# Patient Record
Sex: FEMALE | Race: WHITE | NOT HISPANIC OR LATINO | Employment: FULL TIME | URBAN - METROPOLITAN AREA
[De-identification: names, ages, dates, MRNs, and addresses within clinical notes are randomized per-mention and may not be internally consistent; named-entity substitution may affect disease eponyms.]

---

## 2017-02-16 ENCOUNTER — GENERIC CONVERSION - ENCOUNTER (OUTPATIENT)
Dept: OTHER | Facility: OTHER | Age: 32
End: 2017-02-16

## 2017-06-19 ENCOUNTER — GENERIC CONVERSION - ENCOUNTER (OUTPATIENT)
Dept: OTHER | Facility: OTHER | Age: 32
End: 2017-06-19

## 2017-06-19 ENCOUNTER — ALLSCRIPTS OFFICE VISIT (OUTPATIENT)
Dept: OTHER | Facility: OTHER | Age: 32
End: 2017-06-19

## 2017-06-28 ENCOUNTER — GENERIC CONVERSION - ENCOUNTER (OUTPATIENT)
Dept: OTHER | Facility: OTHER | Age: 32
End: 2017-06-28

## 2017-10-13 ENCOUNTER — ALLSCRIPTS OFFICE VISIT (OUTPATIENT)
Dept: OTHER | Facility: OTHER | Age: 32
End: 2017-10-13

## 2017-12-13 ENCOUNTER — GENERIC CONVERSION - ENCOUNTER (OUTPATIENT)
Dept: OTHER | Facility: OTHER | Age: 32
End: 2017-12-13

## 2018-01-09 NOTE — CONSULTS
I had the pleasure of evaluating your patient, WILLIE ASIM  My full evaluation follows:      Chief Complaint  Chief Complaint:   The patient presents to the office today with Followup, history of left lower extremity DVT and factor V Leiden mutation  History of Present Illness  29-year-old woman with a factor V Leiden mutation inherited on the paternal side  Patient was admitted to [facility] in 2007 with extensive thrombus in the left lower extremity and had emergency thrombolysis and then was treated with IV heparin and is currently on Coumadin  The patient has been noncompliant in terms of followup and taking her Coumadin and INR checks  Mrs Cecilia Plascencia returns for follow-up  Ms Cecilia Plascencia states feeling okay, baseline  No problems with excessive bruising or bleeding  No lower extremity swelling, pain or cords  No shortness of breath or dyspnea on exertion  No chest pain or pressure  Treatment for diabetes is ongoing  Activities are baseline  As before, patient is not happy with the INR draws; would like to be on 1 of the oral thrombin inhibitors  Review of Systems    Constitutional: as noted in HPI  Eyes: No complaints of eye pain, no red eyes, no eyesight problems, no discharge, no dry eyes, no itching of eyes  ENT: no complaints of earache, no loss of hearing, no nose bleeds, no nasal discharge, no sore throat, no hoarseness  Cardiovascular: No complaints of slow heart rate, no fast heart rate, no chest pain, no palpitations, no leg claudication, no lower extremity edema  Respiratory: No complaints of shortness of breath, no wheezing, no cough, no SOB on exertion, no orthopnea, no PND  Gastrointestinal: No complaints of abdominal pain, no constipation, no nausea or vomiting, no diarrhea, no bloody stools  Genitourinary: No complaints of dysuria, no incontinence, no pelvic pain, no dysmenorrhea, no vaginal discharge or bleeding     Musculoskeletal: No complaints of arthralgias, no myalgias, no joint swelling or stiffness, no limb pain or swelling  Integumentary: No complaints of skin rash or lesions, no itching, no skin wounds, no breast pain or lump and no skin wound  Neurological: No complaints of headache, no confusion, no convulsions, no numbness, no dizziness or fainting, no tingling, no limb weakness, no difficulty walking  Psychiatric: Not suicidal, no sleep disturbance, no anxiety or depression, no change in personality, no emotional problems  Endocrine: No complaints of proptosis, no hot flashes, no muscle weakness, no deepening of the voice, no feelings of weakness  Hematologic/Lymphatic: No complaints of swollen glands, no swollen glands in the neck, does not bleed easily, does not bruise easily  Active Problems    1  Anticoagulant long-term use (V58 61) (Z79 01)   2  Embolism and thrombosis (453 9) (I74 9)   3  Factor V Leiden mutation (289 81) (D68 51)   4  Insulin resistance (277 7) (E88 81)   5  Tobacco use (305 1) (Z72 0)    Past Medical History    · History of Factor V Leiden mutation (289 81) (D68 51)   · History of acute renal failure (V13 09) (Z87 448)   · History of blood coagulation disorder (V12 3) (Z86 2)   · History of epistaxis (V12 69) (Z87 898)   · History of herpes zoster (V12 09) (Z86 19)   · History of hypotension (V12 59) (Z86 79)   · History of menorrhagia (V13 29) (Z87 42)    Surgical History    · History of Interruption Inferior Vena Cava Chelsie Filter Placement    Family History    · Family history of Diabetes Mellitus (V18 0)    · Family history of Cancer   · Family history of Factor V Leiden Mutation, Hypercoagulation   · Family history of Heart Disease (V17 49)   · Family history of Stroke Complications    Social History    · Denied: History of Alcohol Use (History)   · Denied: History of Drug Use   · Never A Smoker   · Tobacco use (305 1) (Z72 0)    Current Meds   1  FLUoxetine HCl - 20 MG Oral Capsule;    Therapy: (Recorded:07Uxp7528) to Recorded   2  MetFORMIN HCl - 500 MG Oral Tablet; take 2 tablet daily; Therapy: (Recorded:11Jan2016) to Recorded   3  Warfarin Sodium 4 MG Oral Tablet; take 2 tablets daily; Therapy: 25MNP6821 to (Evaluate:76Njm6080)  Requested for: 12Sep2017; Last   Rx:12Sep2017; Status: ACTIVE - Retrospective By Protocol Authorization Ordered    Allergies    1  No Known Drug Allergies    Vitals   Recorded: 86VIH6250 11:37AM   Temperature 96 8 F   Heart Rate 91   Respiration 18   Systolic 064   Diastolic 82   Height 5 ft 5 in   Weight 280 lb 2 oz   BMI Calculated 46 62   BSA Calculated 2 28   O2 Saturation 98     Physical Exam    Constitutional   General appearance: Abnormal   marked obesity - same as before  Eyes   Conjunctiva and lids: No swelling, erythema or discharge  Pupils and irises: Equal, round and reactive to light  Ears, Nose, Mouth, and Throat   External inspection of ears and nose: Normal     Nasal mucosa, septum, and turbinates: Normal without edema or erythema  Oropharynx: Normal with no erythema, edema, exudate or lesions  Pulmonary   Respiratory effort: No increased work of breathing or signs of respiratory distress  Auscultation of lungs: Abnormal   Distant breath sounds, scattered rhonchi  Cardiovascular   Palpation of heart: Normal PMI, no thrills  Auscultation of heart: Normal rate and rhythm, normal S1 and S2, without murmurs  Examination of extremities for edema and/or varicosities: Abnormal   Bilateral lower extremity edema and obesity, no obvious cords, pulses are 1+  Abdomen   Abdomen: Non-tender, no masses  Liver and spleen: Abnormal   Obese cannot palpate liver or spleen  Lymphatic   Palpation of lymph nodes in neck: No lymphadenopathy  No adenopathy in the neck, suprapubic area, axilla and groin bilaterally  Musculoskeletal   Gait and station: Normal     Digits and nails: Normal without clubbing or cyanosis      Inspection/palpation of joints, bones, and muscles: Normal     Skin Warm, moist, relatively good color, no petechiae or ecchymoses  Neurologic   Cranial nerves: Cranial nerves 2-12 intact  Sensation: No sensory loss  Psychiatric   Orientation to person, place, and time: Normal     Mood and affect: Normal          Results/Data    Results   Lab Results 10/10/2017 INR = 1 6  08/25/2017 INR = 2 2    5/2/17 INR = 2 2  3/10/17 INR = 2 0  2/13/2017 INR = 1 2  10/16/15 INR = 1 8  7/2/15 INR = 1 6  4/8/15 INR = 1 3  1/5/15 INR = 1 9  11/4/13 PTT = 22 8 INR = 2 1  Assessment    1  Anticoagulant long-term use (V58 61) (Z79 01)   2  Factor V Leiden mutation (289 81) (D68 51)    Plan  Anticoagulant long-term use, Embolism and thrombosis    · (1) PT WITH INR; Status:Active - Retrospective By Protocol Authorization; Requested  for:[id number];    Perform:[facility]; Order Comments:STANDING ORDER TO BE DONE EVERY 4 WEEKS OR AS DIRECTED; IUD:08ACQ1056; Last Updated Mariposa Vitale; 10/13/2017 11:46:08 AM;Ordered; For:Anticoagulant long-term use, Embolism and thrombosis; Ordered By:Seth Laboy;  Factor V Leiden mutation    · Follow-up visit in 4 Months Evaluation and Treatment  Follow-up  Status: Complete   Done: 33DTF0523 01:45PM   Ordered; For: Factor V Leiden mutation; Ordered By: Eriberto Roberts Performed:  Due: 70BOE2151; Last Updated By: Paty Roberts; 10/13/2017 11:58:59 AM    Discussion/Summary    70-year-old woman with factor V Leiden mutation and a prior history of an extensive left lower extremity DVT  Mrs Feli Allan is presently on 9 mg of warfarin daily  Patient feels okay and clinically there are no troubling signs  For the time being, patient is to continue with the Coumadin  We rediscussed the importance of follow-up INRs on time  Patient will also call if she decides on trying to get pregnant  Mrs Feli Allan is to return in 4 months  Carefully review your medication list and verify that the list is accurate and up-to-date  Please call the hematology/oncology office if there are medications missing from the list, medications on the list that you are not currently taking or if there is a dosage or instruction that is different from how you're taking a medication  Possible side effects of new medications were reviewed with the patient/guardian today  Thank you very much for allowing me to participate in the care of this patient  If you have any questions, please do not hesitate to contact me        Signatures   Electronically signed by : Ricci Good MD; Oct 16 2017 10:16AM EST                       (Author)

## 2018-01-12 VITALS
WEIGHT: 293 LBS | BODY MASS INDEX: 48.82 KG/M2 | HEIGHT: 65 IN | DIASTOLIC BLOOD PRESSURE: 78 MMHG | SYSTOLIC BLOOD PRESSURE: 122 MMHG | TEMPERATURE: 98 F | RESPIRATION RATE: 20 BRPM | HEART RATE: 89 BPM

## 2018-01-13 NOTE — MISCELLANEOUS
Message   Recorded as Task   Date: 06/28/2017 08:52 AM, Created By: Claudean Hutch   Task Name: Care Coordination   Assigned To: Shannon Robison   Regarding Patient: Adam PEOPLES, Status: Active   Comment:    Claudean Hutch - 28 Jun 2017 8:52 AM     TASK CREATED  FYI-patient is non-compliant with getting her routine INR test done  Patient was due on May 30th for test and patient has not had done yet  Several messages left for Angelica and she was seen last week and still no blood test    Brigitte Campo - 28 Jun 2017 10:02 AM     TASK EDITED  continued non-compliance noted for pt  Active Problems    1  Anticoagulant long-term use (V58 61) (Z79 01)   2  Embolism and thrombosis (453 9) (I74 9)   3  Factor V Leiden mutation (289 81) (D68 51)   4  Insulin resistance (277 7) (E88 81)   5  Tobacco use (305 1) (Z72 0)    Current Meds   1  FLUoxetine HCl - 20 MG Oral Capsule; Therapy: (Recorded:17Kqu6207) to Recorded   2  MetFORMIN HCl - 500 MG Oral Tablet; take 2 tablet daily; Therapy: (Recorded:11Jan2016) to Recorded   3  Warfarin Sodium 3 MG Oral Tablet; TAKE 3 TABLETS BY MOUTH EVERY DAY; Therapy: 68OJS1709 to (Evaluate:71Tse8000)  Requested for: 75WWG8705; Last   Rx:19Jun2017 Ordered   4  Warfarin Sodium 4 MG Oral Tablet; TAKE 1 TABLET DAILY AS DIRECTED; Therapy: 68CWR7831 to (Evaluate:11Jun2017)  Requested for: 81Pxd9577; Last   Rx:58Xqi1907; Status: ACTIVE - Retrospective By Protocol Authorization Ordered   5  Warfarin Sodium 7 5 MG Oral Tablet (Coumadin); TAKE 1 TABLET BY MOUTH ONCE A   DAY; Therapy: 09NPB4528 to (Evaluate:13Jan2017)  Requested for: 21Nov2016; Last   Rx:14Nov2016 Ordered    Allergies    1   No Known Drug Allergies    Signatures   Electronically signed by : Selinda Burkitt, RN; Jun 28 2017 10:02AM EST                       (Author)

## 2018-01-13 NOTE — MISCELLANEOUS
Message  I called Ms Dionne Lawrence on 16 to remind her to have a PT/INR drawn as it had been 2 months since her last once according to my records  She stated that she had one drawn approximately one month before  I told her I would get the results and be in touch once Dr Niecy St reviewed the results  I also explained that we always call the patient and that if she did not receive a call within a few days to call here  I called [facility] the next morning ( the lab which has her standing order) and they confirmed her last PT/INR was the one I had already addressed from 1/15/16  I called Ms Dionne Lawrence back and left her a message asking her to call here with where she had the testing performed so I could retrieve the recent labs  She has not returned the phone call  As she presently has a prescription good for a year of warfarin, the pharmacy will be contacted to hold refills until she is compliant with her blood draws  Active Problems    1  Anticoagulant long-term use (V58 61) (Z79 01)   2  Embolism and thrombosis (453 9) (I74 9)   3  Factor V Leiden mutation (289 81) (D68 51)    Current Meds   1  FLUoxetine HCl - 40 MG Oral Capsule; Therapy: (Recorded:89Bei6711) to Recorded   2  MetFORMIN HCl - 500 MG Oral Tablet; take 2 tablet daily; Therapy: (Recorded:2016) to Recorded   3  Warfarin Sodium 7 5 MG Oral Tablet (Coumadin); TAKE 1 TABLET DAILY AS   DIRECTED; Therapy: 32QLM4102 to (Evaluate:36Uen4951)  Requested for: 06PGR1216; Last   Rx:2016 Ordered   4  Warfarin Sodium 7 5 MG Oral Tablet; TAKE 1 TABLET DAILY; Therapy: 94FMX1648 to (Evaluate:51Cmd6147)  Requested for: 21XPD0022; Last   Rx:2016 Ordered    Allergies    1   No Known Drug Allergies    Signatures   Electronically signed by : Evelin Umanzor, ; 2016  9:00AM EST                       (Author)

## 2018-01-14 VITALS
BODY MASS INDEX: 46.67 KG/M2 | HEIGHT: 65 IN | WEIGHT: 280.13 LBS | SYSTOLIC BLOOD PRESSURE: 120 MMHG | TEMPERATURE: 96.8 F | OXYGEN SATURATION: 98 % | HEART RATE: 91 BPM | DIASTOLIC BLOOD PRESSURE: 82 MMHG | RESPIRATION RATE: 18 BRPM

## 2018-01-22 VITALS
BODY MASS INDEX: 48 KG/M2 | OXYGEN SATURATION: 98 % | DIASTOLIC BLOOD PRESSURE: 80 MMHG | RESPIRATION RATE: 18 BRPM | HEART RATE: 94 BPM | SYSTOLIC BLOOD PRESSURE: 118 MMHG | TEMPERATURE: 96.5 F | HEIGHT: 65 IN | WEIGHT: 288.13 LBS

## 2018-01-26 LAB — INR PPP: 1.9 (ref 0.86–1.16)

## 2018-01-29 ENCOUNTER — TELEPHONE (OUTPATIENT)
Dept: HEMATOLOGY ONCOLOGY | Facility: MEDICAL CENTER | Age: 33
End: 2018-01-29

## 2018-01-30 DIAGNOSIS — D68.51 FACTOR V LEIDEN MUTATION (HCC): Primary | ICD-10-CM

## 2018-02-09 DIAGNOSIS — I82.5Y9 CHRONIC DEEP VEIN THROMBOSIS (DVT) OF PROXIMAL VEIN OF LOWER EXTREMITY, UNSPECIFIED LATERALITY (HCC): Primary | ICD-10-CM

## 2018-02-09 RX ORDER — WARFARIN SODIUM 4 MG/1
2 TABLET ORAL DAILY
COMMUNITY
Start: 2016-12-01 | End: 2018-02-09 | Stop reason: SDUPTHER

## 2018-02-09 RX ORDER — WARFARIN SODIUM 4 MG/1
8 TABLET ORAL DAILY
Qty: 30 TABLET | Refills: 3 | Status: SHIPPED | OUTPATIENT
Start: 2018-02-09 | End: 2018-10-04 | Stop reason: SDUPTHER

## 2018-03-12 ENCOUNTER — DOCUMENTATION (OUTPATIENT)
Dept: HEMATOLOGY ONCOLOGY | Facility: MEDICAL CENTER | Age: 33
End: 2018-03-12

## 2018-08-22 ENCOUNTER — TELEPHONE (OUTPATIENT)
Dept: FAMILY MEDICINE CLINIC | Facility: CLINIC | Age: 33
End: 2018-08-22

## 2018-08-22 DIAGNOSIS — D68.59 THROMBOPHILIA (HCC): Primary | ICD-10-CM

## 2018-08-22 NOTE — TELEPHONE ENCOUNTER
Patient called she said that she needs a lab slip to get her blood work done for her coumadin levels she can be reached at [phone]   TY

## 2018-08-22 NOTE — TELEPHONE ENCOUNTER
Pt requesting inr bw script  First time script will be ordered in office last seen 3/13/18 for blood clot  Okay to generate script ?

## 2018-08-30 RX ORDER — ALPRAZOLAM 0.25 MG/1
TABLET ORAL
COMMUNITY
Start: 2017-10-18 | End: 2019-02-08 | Stop reason: SDUPTHER

## 2018-08-30 RX ORDER — WARFARIN SODIUM 4 MG/1
TABLET ORAL EVERY 24 HOURS
COMMUNITY
Start: 2018-05-02 | End: 2018-08-31

## 2018-08-30 RX ORDER — FLUOXETINE HYDROCHLORIDE 20 MG/1
CAPSULE ORAL
Refills: 3 | COMMUNITY
Start: 2018-07-04 | End: 2018-12-10 | Stop reason: SDUPTHER

## 2018-08-30 RX ORDER — WARFARIN SODIUM 7.5 MG/1
TABLET ORAL
COMMUNITY
Start: 2016-11-14 | End: 2020-01-29 | Stop reason: DRUGHIGH

## 2018-08-31 ENCOUNTER — OFFICE VISIT (OUTPATIENT)
Dept: OBGYN CLINIC | Facility: CLINIC | Age: 33
End: 2018-08-31
Payer: COMMERCIAL

## 2018-08-31 ENCOUNTER — TELEPHONE (OUTPATIENT)
Dept: FAMILY MEDICINE CLINIC | Facility: CLINIC | Age: 33
End: 2018-08-31

## 2018-08-31 ENCOUNTER — OFFICE VISIT (OUTPATIENT)
Dept: FAMILY MEDICINE CLINIC | Facility: CLINIC | Age: 33
End: 2018-08-31
Payer: COMMERCIAL

## 2018-08-31 VITALS
DIASTOLIC BLOOD PRESSURE: 90 MMHG | RESPIRATION RATE: 18 BRPM | BODY MASS INDEX: 44.81 KG/M2 | WEIGHT: 285.5 LBS | HEART RATE: 88 BPM | SYSTOLIC BLOOD PRESSURE: 160 MMHG | HEIGHT: 67 IN

## 2018-08-31 VITALS
BODY MASS INDEX: 47.65 KG/M2 | DIASTOLIC BLOOD PRESSURE: 80 MMHG | SYSTOLIC BLOOD PRESSURE: 134 MMHG | WEIGHT: 286 LBS | HEIGHT: 65 IN

## 2018-08-31 DIAGNOSIS — IMO0001 CLASS 3 OBESITY WITH BODY MASS INDEX (BMI) OF 45.0 TO 49.9 IN ADULT, UNSPECIFIED OBESITY TYPE, UNSPECIFIED WHETHER SERIOUS COMORBIDITY PRESENT: ICD-10-CM

## 2018-08-31 DIAGNOSIS — E11.8 TYPE 2 DIABETES MELLITUS WITH COMPLICATION, WITHOUT LONG-TERM CURRENT USE OF INSULIN (HCC): Primary | ICD-10-CM

## 2018-08-31 DIAGNOSIS — E11.9 TYPE 2 DIABETES MELLITUS WITHOUT COMPLICATION, WITHOUT LONG-TERM CURRENT USE OF INSULIN (HCC): Primary | ICD-10-CM

## 2018-08-31 DIAGNOSIS — L29.2 VULVAR ITCHING: ICD-10-CM

## 2018-08-31 DIAGNOSIS — Z11.3 SCREENING EXAMINATION FOR VENEREAL DISEASE: ICD-10-CM

## 2018-08-31 DIAGNOSIS — Z01.419 GYNECOLOGIC EXAM NORMAL: Primary | ICD-10-CM

## 2018-08-31 DIAGNOSIS — D68.51 FACTOR V LEIDEN MUTATION (HCC): ICD-10-CM

## 2018-08-31 LAB
ALBUMIN SERPL BCP-MCNC: 3.3 G/DL (ref 3.5–5)
ALP SERPL-CCNC: 73 U/L (ref 46–116)
ALT SERPL W P-5'-P-CCNC: 33 U/L (ref 12–78)
ANION GAP SERPL CALCULATED.3IONS-SCNC: 12 MMOL/L (ref 4–13)
AST SERPL W P-5'-P-CCNC: 19 U/L (ref 5–45)
BILIRUB SERPL-MCNC: 0.39 MG/DL (ref 0.2–1)
BUN SERPL-MCNC: 10 MG/DL (ref 5–25)
CALCIUM SERPL-MCNC: 8.8 MG/DL (ref 8.3–10.1)
CHLORIDE SERPL-SCNC: 100 MMOL/L (ref 100–108)
CO2 SERPL-SCNC: 25 MMOL/L (ref 21–32)
CREAT SERPL-MCNC: 0.61 MG/DL (ref 0.6–1.3)
EST. AVERAGE GLUCOSE BLD GHB EST-MCNC: 212 MG/DL
GFR SERPL CREATININE-BSD FRML MDRD: 119 ML/MIN/1.73SQ M
GLUCOSE SERPL-MCNC: 239 MG/DL (ref 65–140)
HBA1C MFR BLD: 9 % (ref 4.2–6.3)
POTASSIUM SERPL-SCNC: 4.4 MMOL/L (ref 3.5–5.3)
PROT SERPL-MCNC: 6.6 G/DL (ref 6.4–8.2)
SODIUM SERPL-SCNC: 137 MMOL/L (ref 136–145)

## 2018-08-31 PROCEDURE — 83036 HEMOGLOBIN GLYCOSYLATED A1C: CPT | Performed by: NURSE PRACTITIONER

## 2018-08-31 PROCEDURE — 36415 COLL VENOUS BLD VENIPUNCTURE: CPT | Performed by: NURSE PRACTITIONER

## 2018-08-31 PROCEDURE — 99214 OFFICE O/P EST MOD 30 MIN: CPT | Performed by: NURSE PRACTITIONER

## 2018-08-31 PROCEDURE — 99385 PREV VISIT NEW AGE 18-39: CPT | Performed by: PHYSICIAN ASSISTANT

## 2018-08-31 PROCEDURE — 80053 COMPREHEN METABOLIC PANEL: CPT | Performed by: NURSE PRACTITIONER

## 2018-08-31 RX ORDER — LANCETS 30 GAUGE
EACH MISCELLANEOUS
COMMUNITY
Start: 2017-09-07

## 2018-08-31 RX ORDER — CLOBETASOL PROPIONATE 0.5 MG/G
OINTMENT TOPICAL 2 TIMES DAILY
Qty: 45 G | Refills: 0 | Status: SHIPPED | OUTPATIENT
Start: 2018-08-31 | End: 2020-07-29 | Stop reason: SDUPTHER

## 2018-08-31 NOTE — TELEPHONE ENCOUNTER
Can you please change patient medication because the jauvia was very expensive maura horta thank you

## 2018-08-31 NOTE — ASSESSMENT & PLAN NOTE
Was unable to start victoza due to price    Insurance would not cover  Did see endo at [facility] but she doesn't want to use this      Blood Sugar Average: Last 72 hrs:

## 2018-08-31 NOTE — ASSESSMENT & PLAN NOTE
Pap guidelines reviewed  Pap with HPV done today  STD cultures done per patient request    Reviewed birth control options with patient  With hx of DVT on coumadin therapy limited to only progesterone only or nonhormonal options  Reviewed progesterone only pill, Mirena IUD, Depo Provera, Nexplanon, Paragard  Patient most interested in Mirena IUD to also help with heavy menses  Reviewed risks of insertion including perforation, migration that can lead to scarring, surgery and issues with infertility  Reviewed expulsion risk, risk of ectopic pregnancy as well as pelvic inflammatory disease  Reviewed common bleeding patterns and side effects  Consent to check insurance coverage signed today  Will plan to insert with next menstrual cycle pending insurance coverage

## 2018-08-31 NOTE — ASSESSMENT & PLAN NOTE
Reviewed vulvar itching  May be secondary to wearing pad daily, reviewed possible lichen sclerosis  Script for Temovate ointment sent to pharmacy, aware apply thin amount to affected area twice daily for max of 2 weeks at a time  Recommend changing to a different pad or considering not wearing daily  Reviewed options for stress and urge incontinence  Reviewed benefit of weight loss as well as kegel exercises

## 2018-08-31 NOTE — PROGRESS NOTES
Assessment/Plan:    Gynecologic exam normal  Pap guidelines reviewed  Pap with HPV done today  STD cultures done per patient request    Reviewed birth control options with patient  With hx of DVT on coumadin therapy limited to only progesterone only or nonhormonal options  Reviewed progesterone only pill, Mirena IUD, Depo Provera, Nexplanon, Paragard  Patient most interested in Mirena IUD to also help with heavy menses  Reviewed risks of insertion including perforation, migration that can lead to scarring, surgery and issues with infertility  Reviewed expulsion risk, risk of ectopic pregnancy as well as pelvic inflammatory disease  Reviewed common bleeding patterns and side effects  Consent to check insurance coverage signed today  Will plan to insert with next menstrual cycle pending insurance coverage  Vulvar itching  Reviewed vulvar itching  May be secondary to wearing pad daily, reviewed possible lichen sclerosis  Script for Temovate ointment sent to pharmacy, aware apply thin amount to affected area twice daily for max of 2 weeks at a time  Recommend changing to a different pad or considering not wearing daily  Reviewed options for stress and urge incontinence  Reviewed benefit of weight loss as well as kegel exercises  Problem List Items Addressed This Visit     Gynecologic exam normal - Primary     Pap guidelines reviewed  Pap with HPV done today  STD cultures done per patient request    Reviewed birth control options with patient  With hx of DVT on coumadin therapy limited to only progesterone only or nonhormonal options  Reviewed progesterone only pill, Mirena IUD, Depo Provera, Nexplanon, Paragard  Patient most interested in Mirena IUD to also help with heavy menses  Reviewed risks of insertion including perforation, migration that can lead to scarring, surgery and issues with infertility  Reviewed expulsion risk, risk of ectopic pregnancy as well as pelvic inflammatory disease   Reviewed common bleeding patterns and side effects  Consent to check insurance coverage signed today  Will plan to insert with next menstrual cycle pending insurance coverage  Relevant Orders    GP PAP + HPV PLUS + CT + GC    Vulvar itching     Reviewed vulvar itching  May be secondary to wearing pad daily, reviewed possible lichen sclerosis  Script for Temovate ointment sent to pharmacy, aware apply thin amount to affected area twice daily for max of 2 weeks at a time  Recommend changing to a different pad or considering not wearing daily  Reviewed options for stress and urge incontinence  Reviewed benefit of weight loss as well as kegel exercises  Relevant Medications    clobetasol (TEMOVATE) 0 05 % ointment      Other Visit Diagnoses     Screening examination for venereal disease        Relevant Orders    GP PAP + HPV PLUS + CT + GC            Subjective:      Patient ID: Maximo Diego is a 35 y o  female  HPI  34 yo seen for annual exam  Reports menses heavy  Changing pad every 1-2 hrs on heavier days  Lasts 5-6 days  States always heavy have been lighter the past 6 months to year  Had D&C in the past for menorrhagia  Hx significant for Factor V Leiden had DVT  Now has IVF filter and on coumadin  Not using anything for birth control currently  Not planning on having children  Patient reports having itching and irritation of vulva  Wear pad daily secondary to having some urge and stress incontinence  Itching mostly between anus and vaginal orifice and at top of vulva around clitoris  Patient reports has been positive for HPV in the past  Last pap: 2013  The following portions of the patient's history were reviewed and updated as appropriate:   She  has a past medical history of 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[facility] [address] ); Epistaxis; Factor V Leiden mutation ([facility] [address] ); Herpes zoster; Hypotension; and Menorrhagia    She   Patient Active Problem List    Diagnosis Date Noted    Gynecologic exam normal 08/31/2018    Vulvar itching 08/31/2018    Type 2 diabetes mellitus ([place] [address] ) 04/30/2015    Hyperlipidemia 04/30/2015    Anxiety state 04/07/2015    [facility] 12/12/2007    Factor V Leiden mutation ([place] [address] ) 12/12/2007    Pulmonary embolism ([place] [address] ) 12/12/2007     She  has a past surgical history that includes Vena cava filter placement and Dilation and curettage of uterus  Her family history includes Cancer in her family; Diabetes in her father; Factor V Leiden deficiency in her family; Heart disease in her family; Hypercoagulant Ability in her family; Stroke in her family  She  reports that she quit smoking about 7 months ago  She has never used smokeless tobacco  She reports that she does not drink alcohol or use drugs  Current Outpatient Prescriptions   Medication Sig Dispense Refill    ALPRAZolam (XANAX) 0 25 mg tablet take 1/2 to 1 tab  by oral route every 8 hrs as needed for anxiety      Blood Glucose Monitoring Suppl MISC test one time daily      clobetasol (TEMOVATE) 0 05 % ointment Apply topically 2 (two) times a day For max of 2 weeks at a time  45 g 0    Empagliflozin (JARDIANCE) 10 MG TABS Take 1 tablet (10 mg total) by mouth every morning 30 tablet 1    Fluoxetine HCl, PMDD, 20 MG CAPS TK 1 C PO QD IN THE MORNING  3    glucose blood test strip test one time daily      Insulin Pen Needle (NOVOFINE) 32G X 6 MM MISC use once a day with victoza      Lancets MISC test one time daily      metFORMIN (GLUCOPHAGE) 1000 MG tablet 1,000 mg 2 (two) times a day       warfarin (COUMADIN) 4 mg tablet Take 2 tablets (8 mg total) by mouth daily 30 tablet 3    warfarin (COUMADIN) 7 5 mg tablet TK 1 T PO QD       No current facility-administered medications for this visit  She is allergic to no active allergies       Review of Systems   Constitutional: Negative for fatigue, fever and unexpected weight change  HENT: Negative for dental problem and sinus pressure      Eyes: Negative for visual disturbance  Respiratory: Negative for cough, shortness of breath and wheezing  Cardiovascular: Negative for chest pain  Gastrointestinal: Negative for abdominal pain, blood in stool, constipation, diarrhea, nausea and vomiting  Endocrine: Negative for polydipsia  Genitourinary: Negative for difficulty urinating, dyspareunia, dysuria, frequency, hematuria, pelvic pain and urgency  Musculoskeletal: Negative for arthralgias and back pain  Neurological: Negative for dizziness, seizures, light-headedness and headaches  Psychiatric/Behavioral: Negative for suicidal ideas  The patient is not nervous/anxious  Objective:      /80 (BP Location: Left arm, Patient Position: Sitting, Cuff Size: Large)   Ht 5' 5" (1 651 m)   Wt 130 kg (286 lb)   LMP 08/06/2018 (Approximate)   BMI 47 59 kg/m²          Physical Exam   Constitutional: She is oriented to person, place, and time  She appears well-developed and well-nourished  Neck: Neck supple  No thyroid mass and no thyromegaly present  Cardiovascular: Normal rate, regular rhythm and normal heart sounds  Exam reveals no gallop and no friction rub  No murmur heard  Pulmonary/Chest: Effort normal and breath sounds normal  No respiratory distress  She has no wheezes  She has no rales  Right breast exhibits no inverted nipple, no mass, no nipple discharge, no skin change and no tenderness  Left breast exhibits no inverted nipple, no mass, no nipple discharge, no skin change and no tenderness  Breasts are symmetrical    Abdominal: Soft  Normal appearance and bowel sounds are normal  She exhibits no distension and no mass  There is no tenderness  There is no rebound and no guarding  Genitourinary:       No breast swelling, tenderness, discharge or bleeding  There is rash on the right labia  There is no tenderness, lesion or injury on the right labia  There is rash on the left labia   There is no tenderness, lesion or injury on the left labia  Uterus is not deviated, not enlarged and not tender  Cervix exhibits no motion tenderness, no discharge and no friability  Right adnexum displays no mass, no tenderness and no fullness  Left adnexum displays no mass, no tenderness and no fullness  No erythema, tenderness or bleeding in the vagina  No signs of injury around the vagina  No vaginal discharge found  Lymphadenopathy:     She has no cervical adenopathy  Right: No inguinal and no supraclavicular adenopathy present  Left: No inguinal and no supraclavicular adenopathy present  Neurological: She is alert and oriented to person, place, and time  Skin: Skin is warm and dry  No rash noted  No erythema  No pallor  Psychiatric: She has a normal mood and affect   Her behavior is normal  Judgment and thought content normal

## 2018-08-31 NOTE — PROGRESS NOTES
Assessment/Plan:    Type 2 diabetes mellitus ([address] )  Was unable to start victoza due to price    Insurance would not cover  Did see endo at [facility] but she doesn't want to use this  Blood Sugar Average: Last 72 hrs:           Diagnoses and all orders for this visit:    Type 2 diabetes mellitus with complication, without long-term current use of insulin (HCC)  -     HEMOGLOBIN A1C W/ EAG ESTIMATION  -     Comprehensive metabolic panel  -     sitaGLIPtin (JANUVIA) 50 mg tablet; Take 1 tablet (50 mg total) by mouth daily  Drop off numbers in 2 weeks   Factor V Leiden mutation [facility])  Will check on the oral meds and may be able to get off the coumadin  Dr Angelica Wright tried to change but then the insurance didn't cover  She will call and try othe other ones and let us know  Other orders  -     Fluoxetine HCl, PMDD, 20 MG CAPS; TK 1 C PO QD IN THE MORNING  -     metFORMIN (GLUCOPHAGE) 1000 MG tablet; 1,000 mg 2 (two) times a day   -     Discontinue: liraglutide (VICTOZA) injection; inject (1 8MG)  by subcutaneous route  every day  -     ALPRAZolam (XANAX) 0 25 mg tablet; take 1/2 to 1 tab  by oral route every 8 hrs as needed for anxiety  -     warfarin (COUMADIN) 7 5 mg tablet; TK 1 T PO QD  -     Discontinue: warfarin (COUMADIN) 4 mg tablet; every 24 hours          Subjective:      Patient ID: Janine Wesley is a 35 y o  female  HPI    Pt is here for DM   Was seen at [facility] endo and placed on Victoza    Was not able to start due to price and she was concerned with side effects  Doesn't want to go back to endo at this time   Doesn't want to start injectables yet  Wants to start on another oral first    Willing to set up to the dietitian       States Dr Angelica Wright wanted to get ogg of coumadin and put her on one of the oral meds  She is not sure which but it was too expensive  Gave her names of all of them and she will check with insurance as well as websites  She will let us know       The following portions of the patient's history were reviewed and updated as appropriate: allergies, current medications, past family history, past medical history, past social history, past surgical history and problem list     Review of Systems   Constitutional: Negative for activity change, appetite change, fatigue, fever and unexpected weight change  HENT: Negative for congestion, dental problem and sneezing  Eyes: Negative for discharge and visual disturbance  Respiratory: Negative for cough and wheezing  Gastrointestinal: Negative for abdominal pain, constipation, diarrhea, nausea and vomiting  Endocrine: Negative for polydipsia and polyuria  Genitourinary: Negative for dysuria and frequency  Musculoskeletal: Negative for arthralgias  Skin: Negative for rash  Allergic/Immunologic: Negative for environmental allergies and food allergies  Neurological: Positive for light-headedness  Negative for headaches  Hematological: Negative for adenopathy  Psychiatric/Behavioral: Negative for behavioral problems and sleep disturbance  Objective:  Vitals:    08/31/18 1127   BP: 160/90   BP Location: Left arm   Patient Position: Sitting   Cuff Size: Large   Pulse: 88   Resp: 18   Weight: 130 kg (285 lb 8 oz)   Height: 5' 6 5" (1 689 m)      Physical Exam   Constitutional: She appears well-developed and well-nourished  Neck: Normal range of motion  Neck supple  Cardiovascular: Normal rate, regular rhythm and normal heart sounds  Pulmonary/Chest: Effort normal and breath sounds normal    Musculoskeletal: Normal range of motion  She exhibits no edema

## 2018-09-05 LAB
DEPRECATED C TRACH RRNA XXX QL PRB: NOT DETECTED
HPV HR 12 DNA CVX QL NAA+PROBE: NOT DETECTED
HPV16 DNA SPEC QL NAA+PROBE: NOT DETECTED
HPV18 DNA SPEC QL NAA+PROBE: NOT DETECTED
N GONORRHOEA DNA UR QL NAA+PROBE: NOT DETECTED
THIN PREP CVX: NORMAL

## 2018-09-07 ENCOUNTER — TELEPHONE (OUTPATIENT)
Dept: OBGYN CLINIC | Facility: CLINIC | Age: 33
End: 2018-09-07

## 2018-09-14 ENCOUNTER — TELEPHONE (OUTPATIENT)
Dept: OBGYN CLINIC | Facility: CLINIC | Age: 33
End: 2018-09-14

## 2018-09-14 ENCOUNTER — TELEPHONE (OUTPATIENT)
Dept: LABOR AND DELIVERY | Facility: HOSPITAL | Age: 33
End: 2018-09-14

## 2018-09-14 NOTE — TELEPHONE ENCOUNTER
PT CALLED INSURANCE [employer]  COVERED @ 100%  PT ORDERED AND SCHEDULED  TASK SENT TO DR GARCIA FOR CONSENT  PT ALSO AWARE OF PHONE CALL

## 2018-09-15 NOTE — TELEPHONE ENCOUNTER
Phone call to patient  Reviewed with patient device mirena function, risks, benefits, and placement procedure also reviewed in regards to coumadin use  Patient reports understanding and consent with no further questions  Patient desires to proceed and will follow up at appointment

## 2018-09-21 ENCOUNTER — PROCEDURE VISIT (OUTPATIENT)
Dept: OBGYN CLINIC | Facility: CLINIC | Age: 33
End: 2018-09-21
Payer: COMMERCIAL

## 2018-09-21 VITALS
WEIGHT: 284 LBS | HEIGHT: 65 IN | DIASTOLIC BLOOD PRESSURE: 84 MMHG | BODY MASS INDEX: 47.32 KG/M2 | SYSTOLIC BLOOD PRESSURE: 132 MMHG

## 2018-09-21 DIAGNOSIS — Z30.430 ENCOUNTER FOR IUD INSERTION: Primary | ICD-10-CM

## 2018-09-21 LAB — SL AMB POCT URINE HCG: NEGATIVE

## 2018-09-21 PROCEDURE — 81025 URINE PREGNANCY TEST: CPT | Performed by: PHYSICIAN ASSISTANT

## 2018-09-21 PROCEDURE — 58300 INSERT INTRAUTERINE DEVICE: CPT | Performed by: PHYSICIAN ASSISTANT

## 2018-09-21 NOTE — PROGRESS NOTES
Assessment/Plan:    Encounter for IUD insertion  No intercourse, tampon use, soaking in bath tub, or swimming for the next 3 days to avoid risk of infection  Call office with excessive bleeding, cramping, fever, or chills  Problem List Items Addressed This Visit     Encounter for IUD insertion - Primary     No intercourse, tampon use, soaking in bath tub, or swimming for the next 3 days to avoid risk of infection  Call office with excessive bleeding, cramping, fever, or chills  Relevant Medications    levonorgestrel (MIRENA) IUD 20 mcg/day (Completed)            Subjective:      Patient ID: Edward Way is a 35 y o  female  HPI  36 yo seen for Mirena IUD insertion  Patient has menorrhagia and unable to be on estrogen contraceptive secondary to hx on DVT on Coumadin therapy  LMP: 9/12/2018  The following portions of the patient's history were reviewed and updated as appropriate:   She  has a past medical history of 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[address] ); Epistaxis; Factor V Leiden mutation ([facility] ); Herpes zoster; Hypotension; and Menorrhagia  She   Patient Active Problem List    Diagnosis Date Noted    Encounter for IUD insertion 09/21/2018    Gynecologic exam normal 08/31/2018    Vulvar itching 08/31/2018    Type [facility] diabetes mellitus ([facility] ) 04/30/2015    Hyperlipidemia 04/30/2015    Anxiety state 04/07/2015    Deep vein thrombosis (DVT) ([facility] 12/12/2007    Factor V Leiden mutation ([facility] ) 12/12/2007    Pulmonary embolism ([place] [address] ) 12/12/2007     She  has a past surgical history that includes Vena cava filter placement and Dilation and curettage of uterus  Her family history includes Cancer in her family; Diabetes in her father; Factor V Leiden deficiency in her family; Heart disease in her family; Hypercoagulant Ability in her family; Stroke in her family  She  reports that she quit smoking about 8 months ago   She has never used smokeless tobacco  She reports that she does not drink alcohol or use drugs   Current Outpatient Prescriptions   Medication Sig Dispense Refill    ALPRAZolam (XANAX) 0 25 mg tablet take 1/2 to 1 tab  by oral route every 8 hrs as needed for anxiety      Blood Glucose Monitoring Suppl MISC test one time daily      clobetasol (TEMOVATE) 0 05 % ointment Apply topically 2 (two) times a day For max of 2 weeks at a time  45 g 0    Empagliflozin (JARDIANCE) 10 MG TABS Take 1 tablet (10 mg total) by mouth every morning 30 tablet 1    Fluoxetine HCl, PMDD, 20 MG CAPS TK 1 C PO QD IN THE MORNING  3    glucose blood test strip test one time daily      Insulin Pen Needle (NOVOFINE) 32G X 6 MM MISC use once a day with victoza      Lancets MISC test one time daily      metFORMIN (GLUCOPHAGE) 1000 MG tablet 1,000 mg 2 (two) times a day       warfarin (COUMADIN) 4 mg tablet Take 2 tablets (8 mg total) by mouth daily 30 tablet 3    warfarin (COUMADIN) 7 5 mg tablet TK 1 T PO QD       No current facility-administered medications for this visit  She is allergic to no active allergies and no known allergies       Review of Systems   Constitutional: Negative for fatigue, fever and unexpected weight change  HENT: Negative for dental problem and sinus pressure  Eyes: Negative for visual disturbance  Respiratory: Negative for cough, shortness of breath and wheezing  Cardiovascular: Negative for chest pain  Gastrointestinal: Negative for abdominal pain, blood in stool, constipation, diarrhea, nausea and vomiting  Endocrine: Negative for polydipsia  Genitourinary: Negative for difficulty urinating, dyspareunia, dysuria, frequency, hematuria, pelvic pain and urgency  Musculoskeletal: Negative for arthralgias and back pain  Neurological: Negative for dizziness, seizures, light-headedness and headaches  Psychiatric/Behavioral: Negative for suicidal ideas  The patient is not nervous/anxious            Objective:      /84 (BP Location: Left arm, Patient Position: Sitting, Cuff Size: Large)   Ht 5' 5" (1 651 m)   Wt 129 kg (284 lb)   LMP 09/12/2018 (Exact Date)   BMI 47 26 kg/m²            Physical Exam   Constitutional: She is oriented to person, place, and time  She appears well-developed and well-nourished  Genitourinary: There is no rash, tenderness, lesion or injury on the right labia  There is no rash, tenderness, lesion or injury on the left labia  Cervix exhibits no motion tenderness, no discharge and no friability  No erythema, tenderness or bleeding in the vagina  No foreign body in the vagina  No signs of injury around the vagina  No vaginal discharge found  Neurological: She is alert and oriented to person, place, and time  Skin: Skin is warm and dry  No rash noted  No erythema  No pallor         Iud insertions  Date/Time: 9/21/2018 2:59 PM  Performed by: Martina Long by: Lavern Fermin     Consent:     Consent obtained:  Verbal and written    Consent given by:  Patient    Procedure risks and benefits discussed: yes      Patient questions answered: yes      Patient agrees, verbalizes understanding, and wants to proceed: yes      Educational handouts given: yes      Instructions and paperwork completed: yes    Universal protocol:     Patient states understanding of procedure being performed: yes      Relevant documents present and verified: yes    Procedure:     Pelvic exam performed: yes      Negative urine pregnancy test: yes      Cervix cleaned and prepped: yes      Speculum placed in vagina: yes      Tenaculum applied to cervix: yes      Uterus sounded: yes      IUD inserted with no complications: yes      IUD type:  Mirena    Strings trimmed: yes (2 cm)      Uterus sound depth (cm):  8  Post-procedure:     Patient tolerated procedure well: yes

## 2018-09-26 ENCOUNTER — OFFICE VISIT (OUTPATIENT)
Dept: FAMILY MEDICINE CLINIC | Facility: CLINIC | Age: 33
End: 2018-09-26
Payer: COMMERCIAL

## 2018-09-26 VITALS
BODY MASS INDEX: 39.54 KG/M2 | SYSTOLIC BLOOD PRESSURE: 110 MMHG | RESPIRATION RATE: 18 BRPM | HEIGHT: 65 IN | HEART RATE: 94 BPM | WEIGHT: 237.3 LBS | DIASTOLIC BLOOD PRESSURE: 90 MMHG

## 2018-09-26 DIAGNOSIS — H91.8X3 OTHER SPECIFIED HEARING LOSS OF BOTH EARS: Primary | ICD-10-CM

## 2018-09-26 DIAGNOSIS — Z23 IMMUNIZATION DUE: ICD-10-CM

## 2018-09-26 PROCEDURE — 86580 TB INTRADERMAL TEST: CPT

## 2018-09-26 PROCEDURE — 99213 OFFICE O/P EST LOW 20 MIN: CPT | Performed by: NURSE PRACTITIONER

## 2018-09-26 NOTE — PATIENT INSTRUCTIONS
Hearing Loss   WHAT YOU NEED TO KNOW:   Hearing loss means you have trouble hearing or you cannot hear at all in one or both ears  Hearing loss can happen suddenly or slowly over time  DISCHARGE INSTRUCTIONS:   Return to the emergency department if:   · You have fluid, pus, or blood leaking from your ear  · You have sudden, severe hearing loss  Contact your healthcare provider if:   · You have a fever  · You have ear pain that is getting worse  · You have ringing in your ears or dizziness that will not go away  · You have questions or concerns about your condition or care  Manage your hearing loss:   · Protect your hearing  Use ear plugs or ear protectors if you do activities that are very loud  These include using a lawnmower and power tools or going to a concert that has loud music  Use well-fitting foam earplugs that completely block your ear canal  Do not listen to loud music through headphones or earphones  · If you have hearing aids, wear them regularly  Talk to your healthcare provider if you are having trouble using your hearing aid  · Ask about cochlear implants  If hearing aids do not help you, talk to your healthcare provider  Cochlear implants may help you hear better  A cochlear implant is a tiny device that is put into your cochlea (part of your inner ear) during surgery  · Assistive listening devices  (ALDs) pic up sound and send it through earphones or a headset  ALDs can help you hear better when you are in a place with background noise  Examples include theaters, classrooms, or auditoriums  ALDs are also available for phones  ALDs can be used alone or with hearing aids or cochlear implants  · Tell people that you have hearing loss  Ask people to face you directly when they speak to you, and to slow down if they are speaking too fast  When you are in a group setting, sit in a location where you can clearly see the faces of the people who are speaking   Ask people not to speak loudly or shout when they are speaking to you  Try to talk with others in a quiet place  Background noise makes it harder for you to hear  · Pay close attention to your surroundings when you drive  Do not talk to people in your car while you are driving  Watch for problems on the road or approaching emergency vehicles  Follow up with your healthcare provider or audiologist as directed:  Write down your questions so you remember to ask them during your visits  © 2017 [facility] Information is for End User's use only and may not be sold, redistributed or otherwise used for commercial purposes  All illustrations and images included in CareNotes® are the copyrighted property of [employer]  or [employer]  The above information is an  only  It is not intended as medical advice for individual conditions or treatments  Talk to your doctor, nurse or pharmacist before following any medical regimen to see if it is safe and effective for you

## 2018-09-26 NOTE — PROGRESS NOTES
Assessment/Plan:    No problem-specific Assessment & Plan notes found for this encounter  Diagnoses and all orders for this visit:    Immunization due  -     TB Skin Test          Subjective:   Chief Complaint   Patient presents with    Cerumen Impaction    PPD Placement        Patient ID: Taylor Cespedes is a 35 y o  female  Presents today with complaints of ear discomfort and hearing loss for about 5 years  The following portions of the patient's history were reviewed and updated as appropriate: allergies, current medications, past family history, past medical history, past social history, past surgical history and problem list     Review of Systems   HENT: Positive for ear pain (discomfort)  Negative for congestion, ear discharge and hearing loss  Respiratory: Negative for cough  Cardiovascular: Negative for chest pain  Psychiatric/Behavioral: Negative for dysphoric mood  The patient is not nervous/anxious  Objective:  Vitals:    09/26/18 1513   BP: 110/90   BP Location: Left arm   Patient Position: Sitting   Cuff Size: Standard   Pulse: 94   Resp: 18   Weight: 108 kg (237 lb 4 8 oz)   Height: 5' 5" (1 651 m)      Physical Exam   Constitutional: She is oriented to person, place, and time  She appears well-developed and well-nourished  HENT:   Right Ear: External ear normal  Decreased hearing is noted  Left Ear: External ear normal  Decreased hearing is noted  Cardiovascular: Normal rate, regular rhythm and normal heart sounds  Pulmonary/Chest: Breath sounds normal    Neurological: She is alert and oriented to person, place, and time  Skin: Skin is warm and dry  Psychiatric: She has a normal mood and affect   Her behavior is normal

## 2018-09-28 ENCOUNTER — CLINICAL SUPPORT (OUTPATIENT)
Dept: FAMILY MEDICINE CLINIC | Facility: CLINIC | Age: 33
End: 2018-09-28

## 2018-09-28 DIAGNOSIS — Z11.1 TUBERCULIN SKIN TEST READING ENCOUNTER: Primary | ICD-10-CM

## 2018-09-28 LAB
INDURATION: 0 MM
TB SKIN TEST: NEGATIVE

## 2018-10-04 DIAGNOSIS — E11.9 TYPE 2 DIABETES MELLITUS WITHOUT COMPLICATION, WITHOUT LONG-TERM CURRENT USE OF INSULIN (HCC): Primary | ICD-10-CM

## 2018-10-04 DIAGNOSIS — I82.5Y9 CHRONIC DEEP VEIN THROMBOSIS (DVT) OF PROXIMAL VEIN OF LOWER EXTREMITY, UNSPECIFIED LATERALITY (HCC): ICD-10-CM

## 2018-10-04 RX ORDER — WARFARIN SODIUM 4 MG/1
TABLET ORAL
Qty: 90 TABLET | Refills: 0 | Status: SHIPPED | OUTPATIENT
Start: 2018-10-04 | End: 2018-12-09 | Stop reason: SDUPTHER

## 2018-11-23 ENCOUNTER — TELEPHONE (OUTPATIENT)
Dept: FAMILY MEDICINE CLINIC | Facility: CLINIC | Age: 33
End: 2018-11-23

## 2018-12-06 ENCOUNTER — TELEPHONE (OUTPATIENT)
Dept: FAMILY MEDICINE CLINIC | Facility: CLINIC | Age: 33
End: 2018-12-06

## 2018-12-09 DIAGNOSIS — I82.5Y9 CHRONIC DEEP VEIN THROMBOSIS (DVT) OF PROXIMAL VEIN OF LOWER EXTREMITY, UNSPECIFIED LATERALITY (HCC): ICD-10-CM

## 2018-12-10 DIAGNOSIS — F41.9 ANXIETY: Primary | ICD-10-CM

## 2018-12-10 RX ORDER — INFLUENZA A VIRUS A/SINGAPORE/GP1908/2015 IVR-180A (H1N1) ANTIGEN (PROPIOLACTONE INACTIVATED), INFLUENZA A VIRUS A/HONG KONG/4801/2014 X-263B (H3N2) ANTIGEN (PROPIOLACTONE INACTIVATED), INFLUENZA B VIRUS B/BRISBANE/46/2015 ANTIGEN (PROPIOLACTONE INACTIVATED), AND INFLUENZA B VIRUS B/PHUKET/3073/2013 BVR-1B ANTIGEN (PROPIOLACTONE INACTIVATED) 15; 15; 15; 15 UG/.5ML; UG/.5ML; UG/.5ML; UG/.5ML
INJECTION, SUSPENSION INTRAMUSCULAR
Refills: 0 | COMMUNITY
Start: 2018-10-05 | End: 2019-02-08 | Stop reason: ALTCHOICE

## 2018-12-10 RX ORDER — WARFARIN SODIUM 4 MG/1
TABLET ORAL
Qty: 90 TABLET | Refills: 12 | Status: SHIPPED | OUTPATIENT
Start: 2018-12-10 | End: 2019-03-25 | Stop reason: SDUPTHER

## 2018-12-11 DIAGNOSIS — F41.9 ANXIETY: ICD-10-CM

## 2018-12-11 RX ORDER — FLUOXETINE HYDROCHLORIDE 20 MG/1
CAPSULE ORAL
Qty: 30 EACH | Refills: 0 | Status: SHIPPED | OUTPATIENT
Start: 2018-12-11 | End: 2019-01-06 | Stop reason: SDUPTHER

## 2018-12-11 RX ORDER — FLUOXETINE HYDROCHLORIDE 20 MG/1
CAPSULE ORAL
Qty: 30 EACH | Refills: 2 | OUTPATIENT
Start: 2018-12-11

## 2018-12-20 ENCOUNTER — TELEPHONE (OUTPATIENT)
Dept: FAMILY MEDICINE CLINIC | Facility: CLINIC | Age: 33
End: 2018-12-20

## 2019-01-06 DIAGNOSIS — F41.9 ANXIETY: ICD-10-CM

## 2019-01-08 RX ORDER — FLUOXETINE HYDROCHLORIDE 20 MG/1
CAPSULE ORAL
Qty: 30 CAPSULE | Refills: 3 | Status: SHIPPED | OUTPATIENT
Start: 2019-01-08 | End: 2019-06-11 | Stop reason: ALTCHOICE

## 2019-01-30 ENCOUNTER — TELEPHONE (OUTPATIENT)
Dept: FAMILY MEDICINE CLINIC | Facility: CLINIC | Age: 34
End: 2019-01-30

## 2019-01-30 DIAGNOSIS — I82.409 DEEP VEIN THROMBOSIS (DVT) OF LOWER EXTREMITY, UNSPECIFIED CHRONICITY, UNSPECIFIED LATERALITY, UNSPECIFIED VEIN (HCC): Primary | ICD-10-CM

## 2019-01-30 NOTE — TELEPHONE ENCOUNTER
Spoke to Angelica she said that Dr Srikanth Pimentel manages her coumadin and she has not had it done for months because she did not have insurance, last coumadin dose was 5mg please advise on the script I can generate and fax it

## 2019-01-30 NOTE — TELEPHONE ENCOUNTER
Patient called she would like to get a lab script for PT- INR and would like that to be fax to [phone]

## 2019-02-08 ENCOUNTER — ANTICOAG VISIT (OUTPATIENT)
Dept: FAMILY MEDICINE CLINIC | Facility: CLINIC | Age: 34
End: 2019-02-08

## 2019-02-08 ENCOUNTER — OFFICE VISIT (OUTPATIENT)
Dept: FAMILY MEDICINE CLINIC | Facility: CLINIC | Age: 34
End: 2019-02-08
Payer: COMMERCIAL

## 2019-02-08 VITALS
WEIGHT: 277.8 LBS | DIASTOLIC BLOOD PRESSURE: 72 MMHG | HEIGHT: 66 IN | TEMPERATURE: 96.5 F | BODY MASS INDEX: 44.65 KG/M2 | SYSTOLIC BLOOD PRESSURE: 102 MMHG

## 2019-02-08 DIAGNOSIS — E11.9 TYPE 2 DIABETES MELLITUS WITHOUT COMPLICATION, WITHOUT LONG-TERM CURRENT USE OF INSULIN (HCC): ICD-10-CM

## 2019-02-08 DIAGNOSIS — F41.9 ANXIETY: ICD-10-CM

## 2019-02-08 DIAGNOSIS — I82.409 DEEP VEIN THROMBOSIS (DVT) OF LOWER EXTREMITY, UNSPECIFIED CHRONICITY, UNSPECIFIED LATERALITY, UNSPECIFIED VEIN (HCC): ICD-10-CM

## 2019-02-08 DIAGNOSIS — I26.99 OTHER PULMONARY EMBOLISM WITHOUT ACUTE COR PULMONALE, UNSPECIFIED CHRONICITY (HCC): ICD-10-CM

## 2019-02-08 DIAGNOSIS — Z00.00 ANNUAL PHYSICAL EXAM: Primary | ICD-10-CM

## 2019-02-08 DIAGNOSIS — L30.9 ECZEMA, UNSPECIFIED TYPE: ICD-10-CM

## 2019-02-08 DIAGNOSIS — E78.5 HYPERLIPIDEMIA, UNSPECIFIED HYPERLIPIDEMIA TYPE: ICD-10-CM

## 2019-02-08 LAB
ALBUMIN SERPL BCP-MCNC: 3.4 G/DL (ref 3.5–5)
ALP SERPL-CCNC: 72 U/L (ref 46–116)
ALT SERPL W P-5'-P-CCNC: 36 U/L (ref 12–78)
ANION GAP SERPL CALCULATED.3IONS-SCNC: 9 MMOL/L (ref 4–13)
AST SERPL W P-5'-P-CCNC: 19 U/L (ref 5–45)
BASOPHILS # BLD AUTO: 0.03 THOUSANDS/ΜL (ref 0–0.1)
BASOPHILS NFR BLD AUTO: 0 % (ref 0–1)
BILIRUB SERPL-MCNC: 0.35 MG/DL (ref 0.2–1)
BUN SERPL-MCNC: 11 MG/DL (ref 5–25)
CALCIUM SERPL-MCNC: 8.6 MG/DL (ref 8.3–10.1)
CHLORIDE SERPL-SCNC: 102 MMOL/L (ref 100–108)
CHOLEST SERPL-MCNC: 179 MG/DL (ref 50–200)
CO2 SERPL-SCNC: 26 MMOL/L (ref 21–32)
CREAT SERPL-MCNC: 0.62 MG/DL (ref 0.6–1.3)
CREAT UR-MCNC: 99.5 MG/DL
EOSINOPHIL # BLD AUTO: 0.2 THOUSAND/ΜL (ref 0–0.61)
EOSINOPHIL NFR BLD AUTO: 3 % (ref 0–6)
ERYTHROCYTE [DISTWIDTH] IN BLOOD BY AUTOMATED COUNT: 12.8 % (ref 11.6–15.1)
EST. AVERAGE GLUCOSE BLD GHB EST-MCNC: 235 MG/DL
GFR SERPL CREATININE-BSD FRML MDRD: 119 ML/MIN/1.73SQ M
GLUCOSE P FAST SERPL-MCNC: 240 MG/DL (ref 65–99)
HBA1C MFR BLD: 9.8 % (ref 4.2–6.3)
HCT VFR BLD AUTO: 43.7 % (ref 34.8–46.1)
HDLC SERPL-MCNC: 42 MG/DL (ref 40–60)
HGB BLD-MCNC: 14.6 G/DL (ref 11.5–15.4)
IMM GRANULOCYTES # BLD AUTO: 0.04 THOUSAND/UL (ref 0–0.2)
IMM GRANULOCYTES NFR BLD AUTO: 1 % (ref 0–2)
INR PPP: 1.76 (ref 0.86–1.17)
INR PPP: 1.76 (ref 0.86–1.17)
LDLC SERPL CALC-MCNC: 107 MG/DL (ref 0–100)
LYMPHOCYTES # BLD AUTO: 3.12 THOUSANDS/ΜL (ref 0.6–4.47)
LYMPHOCYTES NFR BLD AUTO: 39 % (ref 14–44)
MCH RBC QN AUTO: 30.2 PG (ref 26.8–34.3)
MCHC RBC AUTO-ENTMCNC: 33.4 G/DL (ref 31.4–37.4)
MCV RBC AUTO: 91 FL (ref 82–98)
MICROALBUMIN UR-MCNC: 22.6 MG/L (ref 0–20)
MICROALBUMIN/CREAT 24H UR: 23 MG/G CREATININE (ref 0–30)
MONOCYTES # BLD AUTO: 0.53 THOUSAND/ΜL (ref 0.17–1.22)
MONOCYTES NFR BLD AUTO: 7 % (ref 4–12)
NEUTROPHILS # BLD AUTO: 4.1 THOUSANDS/ΜL (ref 1.85–7.62)
NEUTS SEG NFR BLD AUTO: 50 % (ref 43–75)
NONHDLC SERPL-MCNC: 137 MG/DL
NRBC BLD AUTO-RTO: 0 /100 WBCS
PLATELET # BLD AUTO: 290 THOUSANDS/UL (ref 149–390)
PMV BLD AUTO: 10.6 FL (ref 8.9–12.7)
POTASSIUM SERPL-SCNC: 4.3 MMOL/L (ref 3.5–5.3)
PROT SERPL-MCNC: 7 G/DL (ref 6.4–8.2)
PROTHROMBIN TIME: 20.6 SECONDS (ref 11.8–14.2)
RBC # BLD AUTO: 4.83 MILLION/UL (ref 3.81–5.12)
SODIUM SERPL-SCNC: 137 MMOL/L (ref 136–145)
TRIGL SERPL-MCNC: 148 MG/DL
TSH SERPL DL<=0.05 MIU/L-ACNC: 1.71 UIU/ML (ref 0.36–3.74)
WBC # BLD AUTO: 8.02 THOUSAND/UL (ref 4.31–10.16)

## 2019-02-08 PROCEDURE — 99395 PREV VISIT EST AGE 18-39: CPT | Performed by: NURSE PRACTITIONER

## 2019-02-08 PROCEDURE — 83036 HEMOGLOBIN GLYCOSYLATED A1C: CPT | Performed by: NURSE PRACTITIONER

## 2019-02-08 PROCEDURE — 80053 COMPREHEN METABOLIC PANEL: CPT | Performed by: NURSE PRACTITIONER

## 2019-02-08 PROCEDURE — 36415 COLL VENOUS BLD VENIPUNCTURE: CPT | Performed by: NURSE PRACTITIONER

## 2019-02-08 PROCEDURE — 80061 LIPID PANEL: CPT | Performed by: NURSE PRACTITIONER

## 2019-02-08 PROCEDURE — 84443 ASSAY THYROID STIM HORMONE: CPT | Performed by: NURSE PRACTITIONER

## 2019-02-08 PROCEDURE — 82043 UR ALBUMIN QUANTITATIVE: CPT | Performed by: NURSE PRACTITIONER

## 2019-02-08 PROCEDURE — 85610 PROTHROMBIN TIME: CPT | Performed by: NURSE PRACTITIONER

## 2019-02-08 PROCEDURE — 85025 COMPLETE CBC W/AUTO DIFF WBC: CPT | Performed by: NURSE PRACTITIONER

## 2019-02-08 PROCEDURE — 82570 ASSAY OF URINE CREATININE: CPT | Performed by: NURSE PRACTITIONER

## 2019-02-08 RX ORDER — ALPRAZOLAM 0.25 MG/1
0.25 TABLET ORAL 2 TIMES DAILY PRN
Qty: 20 TABLET | Refills: 0 | Status: SHIPPED | OUTPATIENT
Start: 2019-02-08 | End: 2020-04-08 | Stop reason: ALTCHOICE

## 2019-02-08 NOTE — PATIENT INSTRUCTIONS
Wellness Visit for Adults   AMBULATORY CARE:   A wellness visit  is when you see your healthcare provider to get screened for health problems  You can also get advice on how to stay healthy  Write down your questions so you remember to ask them  Ask your healthcare provider how often you should have a wellness visit  What happens at a wellness visit:  Your healthcare provider will ask about your health, and your family history of health problems  This includes high blood pressure, heart disease, and cancer  He or she will ask if you have symptoms that concern you, if you smoke, and about your mood  You may also be asked about your intake of medicines, supplements, food, and alcohol  Any of the following may be done:  · Your weight  will be checked  Your height may also be checked so your body mass index (BMI) can be calculated  Your BMI shows if you are at a healthy weight  · Your blood pressure  and heart rate will be checked  Your temperature may also be checked  · Blood and urine tests  may be done  Blood tests may be done to check your cholesterol levels  Abnormal cholesterol levels increase your risk for heart disease and stroke  You may also need a blood or urine test to check for diabetes if you are at increased risk  Urine tests may be done to look for signs of an infection or kidney disease  · A physical exam  includes checking your heartbeat and lungs with a stethoscope  Your healthcare provider may also check your skin to look for sun damage  · Screening tests  may be recommended  A screening test is done to check for diseases that may not cause symptoms  The screening tests you may need depend on your age, gender, family history, and lifestyle habits  For example, colorectal screening may be recommended if you are 48years old or older  Screening tests you need if you are a woman:   · A Pap smear  is used to screen for cervical cancer   Pap smears are usually done every 3 to 5 years depending on your age  You may need them more often if you have had abnormal Pap smear test results in the past  Ask your healthcare provider how often you should have a Pap smear  · A mammogram  is an x-ray of your breasts to screen for breast cancer  Experts recommend mammograms every 2 years starting at age 48 years  You may need a mammogram at age 52 years or younger if you have an increased risk for breast cancer  Talk to your healthcare provider about when you should start having mammograms and how often you need them  Vaccines you may need:   · Get an influenza vaccine  every year  The influenza vaccine protects you from the flu  Several types of viruses cause the flu  The viruses change over time, so new vaccines are made each year  · Get a tetanus-diphtheria (Td) booster vaccine  every 10 years  This vaccine protects you against tetanus and diphtheria  Tetanus is a severe infection that may cause painful muscle spasms and lockjaw  Diphtheria is a severe bacterial infection that causes a thick covering in the back of your mouth and throat  · Get a human papillomavirus (HPV) vaccine  if you are female and aged 23 to 32 or male 23 to 24 and never received it  This vaccine protects you from HPV infection  HPV is the most common infection spread by sexual contact  HPV may also cause vaginal, penile, and anal cancers  · Get a pneumococcal vaccine  if you are aged 72 years or older  The pneumococcal vaccine is an injection given to protect you from pneumococcal disease  Pneumococcal disease is an infection caused by pneumococcal bacteria  The infection may cause pneumonia, meningitis, or an ear infection  · Get a shingles vaccine  if you are aged 61 or older, even if you have had shingles before  The shingles vaccine is an injection to protect you from the varicella-zoster virus  This is the same virus that causes chickenpox   Shingles is a painful rash that develops in people who had chickenpox or have been exposed to the virus  How to eat healthy:  My Plate is a model for planning healthy meals  It shows the types and amounts of foods that should go on your plate  Fruits and vegetables make up about half of your plate, and grains and protein make up the other half  A serving of dairy is included on the side of your plate  The amount of calories and serving sizes you need depends on your age, gender, weight, and height  Examples of healthy foods are listed below:  · Eat a variety of vegetables  such as dark green, red, and orange vegetables  You can also include canned vegetables low in sodium (salt) and frozen vegetables without added butter or sauces  · Eat a variety of fresh fruits , canned fruit in 100% juice, frozen fruit, and dried fruit  · Include whole grains  At least half of the grains you eat should be whole grains  Examples include whole-wheat bread, wheat pasta, brown rice, and whole-grain cereals such as oatmeal     · Eat a variety of protein foods such as seafood (fish and shellfish), lean meat, and poultry without skin (turkey and chicken)  Examples of lean meats include pork leg, shoulder, or tenderloin, and beef round, sirloin, tenderloin, and extra lean ground beef  Other protein foods include eggs and egg substitutes, beans, peas, soy products, nuts, and seeds  · Choose low-fat dairy products such as skim or 1% milk or low-fat yogurt, cheese, and cottage cheese  · Limit unhealthy fats  such as butter, hard margarine, and shortening  Exercise:  Exercise at least 30 minutes per day on most days of the week  Some examples of exercise include walking, biking, dancing, and swimming  You can also fit in more physical activity by taking the stairs instead of the elevator or parking farther away from stores  Include muscle strengthening activities 2 days each week  Regular exercise provides many health benefits   It helps you manage your weight, and decreases your risk for type 2 diabetes, heart disease, stroke, and high blood pressure  Exercise can also help improve your mood  Ask your healthcare provider about the best exercise plan for you  General health and safety guidelines:   · Do not smoke  Nicotine and other chemicals in cigarettes and cigars can cause lung damage  Ask your healthcare provider for information if you currently smoke and need help to quit  E-cigarettes or smokeless tobacco still contain nicotine  Talk to your healthcare provider before you use these products  · Limit alcohol  A drink of alcohol is 12 ounces of beer, 5 ounces of wine, or 1½ ounces of liquor  · Lose weight, if needed  Being overweight increases your risk of certain health conditions  These include heart disease, high blood pressure, type 2 diabetes, and certain types of cancer  · Protect your skin  Do not sunbathe or use tanning beds  Use sunscreen with a SPF 15 or higher  Apply sunscreen at least 15 minutes before you go outside  Reapply sunscreen every 2 hours  Wear protective clothing, hats, and sunglasses when you are outside  · Drive safely  Always wear your seatbelt  Make sure everyone in your car wears a seatbelt  A seatbelt can save your life if you are in an accident  Do not use your cell phone when you are driving  This could distract you and cause an accident  Pull over if you need to make a call or send a text message  · Practice safe sex  Use latex condoms if are sexually active and have more than one partner  Your healthcare provider may recommend screening tests for sexually transmitted infections (STIs)  · Wear helmets, lifejackets, and protective gear  Always wear a helmet when you ride a bike or motorcycle, go skiing, or play sports that could cause a head injury  Wear protective equipment when you play sports  Wear a lifejacket when you are on a boat or doing water sports    © 2017 [address] [employer] Information is for End User's use only and may not be sold, redistributed or otherwise used for commercial purposes  All illustrations and images included in CareNotes® are the copyrighted property of [employer]  or [employer]  The above information is an  only  It is not intended as medical advice for individual conditions or treatments  Talk to your doctor, nurse or pharmacist before following any medical regimen to see if it is safe and effective for you  Obesity   AMBULATORY CARE:   Obesity  is when your body mass index (BMI) is greater than 30  Your healthcare provider will use your height and weight to measure your BMI  The risks of obesity include  many health problems, such as injuries or physical disability  You may need tests to check for the following:  · Diabetes     · High blood pressure or high cholesterol     · Heart disease     · Gallbladder or liver disease     · Cancer of the colon, breast, prostate, liver, or kidney     · Sleep apnea     · Arthritis or gout  Seek care immediately if:   · You have a severe headache, confusion, or difficulty speaking  · You have weakness on one side of your body  · You have chest pain, sweating, or shortness of breath  Contact your healthcare provider if:   · You have symptoms of gallbladder or liver disease, such as pain in your upper abdomen  · You have knee or hip pain and discomfort while walking  · You have symptoms of diabetes, such as intense hunger and thirst, and frequent urination  · You have symptoms of sleep apnea, such as snoring or daytime sleepiness  · You have questions or concerns about your condition or care  Treatment for obesity  focuses on helping you lose weight to improve your health  Even a small decrease in BMI can reduce the risk for many health problems  Your healthcare provider will help you set a weight-loss goal   · Lifestyle changes  are the first step in treating obesity   These include making healthy food choices and getting regular physical activity  Your healthcare provider may suggest a weight-loss program that involves coaching, education, and therapy  · Medicine  may help you lose weight when it is used with a healthy diet and physical activity  · Surgery  can help you lose weight if you are very obese and have other health problems  There are several types of weight-loss surgery  Ask your healthcare provider for more information  Be successful losing weight:   · Set small, realistic goals  An example of a small goal is to walk for 20 minutes 5 days a week  Anther goal is to lose 5% of your body weight  · Tell friends, family members, and coworkers about your goals  and ask for their support  Ask a friend to lose weight with you, or join a weight-loss support group  · Identify foods or triggers that may cause you to overeat , and find ways to avoid them  Remove tempting high-calorie foods from your home and workplace  Place a bowl of fresh fruit on your kitchen counter  If stress causes you to eat, then find other ways to cope with stress  · Keep a diary to track what you eat and drink  Also write down how many minutes of physical activity you do each day  Weigh yourself once a week and record it in your diary  Eating changes: You will need to eat 500 to 1,000 fewer calories each day than you currently eat to lose 1 to 2 pounds a week  The following changes will help you cut calories:  · Eat smaller portions  Use small plates, no larger than 9 inches in diameter  Fill your plate half full of fruits and vegetables  Measure your food using measuring cups until you know what a serving size looks like  · Eat 3 meals and 1 or 2 snacks each day  Plan your meals in advance  Ivy Hernandez and eat at home most of the time  Eat slowly  · Eat fruits and vegetables at every meal   They are low in calories and high in fiber, which makes you feel full   Do not add butter, margarine, or cream sauce to vegetables  Use herbs to season steamed vegetables  · Eat less fat and fewer fried foods  Eat more baked or grilled chicken and fish  These protein sources are lower in calories and fat than red meat  Limit fast food  Dress your salads with olive oil and vinegar instead of bottled dressing  · Limit the amount of sugar you eat  Do not drink sugary beverages  Limit alcohol  Activity changes:  Physical activity is good for your body in many ways  It helps you burn calories and build strong muscles  It decreases stress and depression, and improves your mood  It can also help you sleep better  Talk to your healthcare provider before you begin an exercise program   · Exercise for at least 30 minutes 5 days a week  Start slowly  Set aside time each day for physical activity that you enjoy and that is convenient for you  It is best to do both weight training and an activity that increases your heart rate, such as walking, bicycling, or swimming  · Find ways to be more active  Do yard work and housecleaning  Walk up the stairs instead of using elevators  Spend your leisure time going to events that require walking, such as outdoor festivals or fairs  This extra physical activity can help you lose weight and keep it off  Follow up with your healthcare provider as directed: You may need to meet with a dietitian  Write down your questions so you remember to ask them during your visits  © 2017 [facility] Information is for End User's use only and may not be sold, redistributed or otherwise used for commercial purposes  All illustrations and images included in CareNotes® are the copyrighted property of [employer]  or [employer]  The above information is an  only  It is not intended as medical advice for individual conditions or treatments   Talk to your doctor, nurse or pharmacist before following any medical regimen to see if it is safe and effective for you

## 2019-02-08 NOTE — PROGRESS NOTES
ADULT ANNUAL PHYSICAL  [facility] - [facility]    NAME: Angelica Restrepo  AGE: 35 y o  SEX: female  : 1985     DATE: 2019     Assessment and Plan:     Problem List Items Addressed This Visit     Type 2 diabetes mellitus ([facility] )    Relevant Orders    Comprehensive metabolic panel    HEMOGLOBIN A1C W/ EAG ESTIMATION    Microalbumin / creatinine urine ratio    Hyperlipidemia    Relevant Orders    Lipid panel    Pulmonary embolism ([facility] )      Other Visit Diagnoses     Annual physical exam    -  Primary    Relevant Orders    CBC and differential    Anxiety        Relevant Medications    ALPRAZolam (XANAX) 0 25 mg tablet    Other Relevant Orders    TSH, 3rd generation with Free T4 reflex    Eczema, unspecified type        Relevant Medications    triamcinolone (KENALOG) 0 1 % ointment          Health maintenance and preventative care screenings were discussed with patient today  Appropriate education was printed on patient's after visit summary  · Discussed risks/benefits of screening for cervical cancer, high cholesterol and diabetes  Patient agrees to screening for high cholesterol and diabetes  · Immunizations were reviewed: patient is up-to-date with her immunizations  Counseling:  · Dental Health: discussed importance of regular tooth brushing, flossing, and dental visits  No Follow-up on file  Chief Complaint:     Chief Complaint   Patient presents with    Annual Exam      History of Present Illness:     Adult Annual Physical   Patient here for a comprehensive physical exam  The patient reports problems - Hasn't had testing for some time due to lost insurance  Has had IUD inserted and doing ok     Gets leg fatigue after being up all day      Diet and Physical Activity  · Diet/Nutrition: well balanced diet, consuming 3-5 servings of fruits/vegetables daily and Has to be careful due to coumadin     · Weight concerns: patient has class 3 obesity (BMI >40)  · Exercise: no formal exercise  Depression Screening  PHQ-9 Depression Screening    PHQ-9:    Frequency of the following problems over the past two weeks:       Little interest or pleasure in doing things:  0 - not at all  Feeling down, depressed, or hopeless:  0 - not at all  PHQ-2 Score:  0       General Health  · Sleep: sleeping off and on and having issues   · Hearing: normal - bilateral   · Vision: no vision problems and needs eye exam     · Dental: regular dental visits  /GYN Health  · Last menstrual period: on iud  · Contraceptive method: IUD placement  ·      Review of Systems:     Review of Systems   Constitutional: Negative for activity change, appetite change, fatigue, fever and unexpected weight change  HENT: Negative for congestion, dental problem, nosebleeds, postnasal drip, rhinorrhea, sneezing and sore throat  Eyes: Negative for discharge and visual disturbance  Respiratory: Negative for cough and wheezing  Cardiovascular: Negative for chest pain and palpitations  Gastrointestinal: Negative for abdominal pain, constipation, diarrhea, nausea and vomiting  Endocrine: Negative for polydipsia and polyuria  Genitourinary: Negative for dysuria, frequency and urgency  Musculoskeletal: Negative for arthralgias  Skin: Negative for rash  Allergic/Immunologic: Negative for environmental allergies and food allergies  Neurological: Negative for dizziness, weakness, light-headedness, numbness and headaches  Hematological: Negative for adenopathy  Psychiatric/Behavioral: Negative for behavioral problems and sleep disturbance        Past Medical History:     Past Medical History:   Diagnosis Date    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[address] )     Epistaxis     Factor V Leiden mutation ([place] [address] )     Herpes zoster     Hypotension     Menorrhagia       Past Surgical History:     Past Surgical History:   Procedure Laterality Date    DILATION AND CURETTAGE OF UTERUS  VENA CAVA FILTER PLACEMENT      INTERRUPTION INFERIOR, GREENFIRELD FILTER PLACEMENT      Social History:     Social History     Social History    Marital status: Single     Spouse name: N/A    Number of children: N/A    Years of education: N/A     Occupational History          Social History Main Topics    Smoking status: Former Smoker     Quit date: 1/1/2018    Smokeless tobacco: Former User     Quit date: 1/8/2018      Comment: TOBACCO USE    Alcohol use Yes      Comment: rarely    Drug use: No    Sexual activity: Not Asked     Other Topics Concern    None     Social History Narrative    None      Family History:     Family History   Problem Relation Age of Onset    Diabetes Father     Cancer Family     Stroke Family     Heart disease Family     Hypercoagulant Ability Family     Factor V Leiden deficiency Family         MUTATION      Current Medications:     Current Outpatient Prescriptions   Medication Sig Dispense Refill    ALPRAZolam (XANAX) 0 25 mg tablet Take 1 tablet (0 25 mg total) by mouth 2 (two) times a day as needed for anxiety 20 tablet 0    Blood Glucose Monitoring Suppl MISC test one time daily      clobetasol (TEMOVATE) 0 05 % ointment Apply topically 2 (two) times a day For max of 2 weeks at a time  45 g 0    Fluoxetine HCl, PMDD, 20 MG CAPS TAKE 1 CAPSULE BY MOUTH EVERY MORNING 30 capsule 3    glucose blood test strip test one time daily      Lancets MISC test one time daily      metFORMIN (GLUCOPHAGE) 1000 MG tablet TAKE 1 TABLET BY MOUTH EVERY DAY WITH THE MORNING AND EVENING MEAL 180 tablet 3    warfarin (COUMADIN) 4 mg tablet TAKE 1 TABLET BY MOUTH EVERY DAY 90 tablet 12    warfarin (COUMADIN) 7 5 mg tablet TK 1 T PO QD      triamcinolone (KENALOG) 0 1 % ointment Apply topically 2 (two) times a day 30 g 0     No current facility-administered medications for this visit  Allergies:      Allergies   Allergen Reactions    No Active Allergies  No Known Allergies       Objective:     /72 (BP Location: Left arm, Patient Position: Sitting, Cuff Size: Large)   Temp (!) 96 5 °F (35 8 °C) (Temporal)   Ht 5' 5 75" (1 67 m)   Wt 126 kg (277 lb 12 8 oz)   BMI 45 18 kg/m²     Physical Exam   Constitutional: She is oriented to person, place, and time  She appears well-developed and well-nourished  HENT:   Right Ear: External ear normal    Left Ear: External ear normal    Nose: Nose normal    Mouth/Throat: Oropharynx is clear and moist    Eyes: Conjunctivae and EOM are normal    Neck: Normal range of motion  Neck supple  No thyromegaly present  Cardiovascular: Normal rate, regular rhythm and normal heart sounds  No murmur heard  Pulses:       Dorsalis pedis pulses are 2+ on the right side, and 2+ on the left side  Pulmonary/Chest: Effort normal and breath sounds normal    Abdominal: Soft  Bowel sounds are normal    Musculoskeletal: Normal range of motion  Feet:   Right Foot:   Skin Integrity: Negative for ulcer, skin breakdown, erythema, warmth, callus or dry skin  Left Foot:   Skin Integrity: Negative for ulcer, skin breakdown, erythema, warmth, callus or dry skin  Lymphadenopathy:     She has no cervical adenopathy  Neurological: She is alert and oriented to person, place, and time  Skin: Skin is warm and dry  Capillary refill takes less than 2 seconds  Psychiatric: She has a normal mood and affect  Her behavior is normal  Judgment and thought content normal    Vitals reviewed         Health Maintenance:     Health Maintenance   Topic Date Due    Diabetic Foot Exam  06/24/1995    DM Eye Exam  06/24/1995    URINE MICROALBUMIN  06/24/1995    INFLUENZA VACCINE  07/01/2018    HEMOGLOBIN A1C  02/28/2019    Depression Screening PHQ  02/08/2020    DTaP,Tdap,and Td Vaccines (2 - Td) 04/08/2025    Pneumococcal PPSV23 Medium Risk Adult  Completed     Immunization History   Administered Date(s) Administered    Influenza 02/20/2018    Influenza Split 02/20/2018    Pneumococcal Polysaccharide PPV23 04/30/2015    Tdap 04/08/2015    Tuberculin Skin Test-PPD Intradermal 09/26/2018     Patient's shoes and socks removed  Right Foot/Ankle   Right Foot Inspection  Skin Exam: skin normal and skin intact no dry skin, no warmth, no callus, no erythema, no maceration, no abnormal color, no pre-ulcer, no ulcer and no callus                          Toe Exam: ROM and strength within normal limits  Sensory       Monofilament testing: intact  Vascular  Capillary refills: < 3 seconds  The right DP pulse is 2+  Left Foot/Ankle  Left Foot Inspection  Skin Exam: skin normal and skin intactno dry skin, no warmth, no erythema, no maceration, normal color, no pre-ulcer, no ulcer and no callus                         Toe Exam: ROM and strength within normal limits                   Sensory       Monofilament: intact  Vascular  Capillary refills: < 3 seconds  The left DP pulse is 2+  Assign Risk Category:  No deformity present;  No loss of protective sensation;        Risk: 0    Asher Copeland, [address] [facility]

## 2019-02-08 NOTE — PROGRESS NOTES
Corazon called pt  Lm to return call to find out if she is taking warfarin, if so, what dose is she taking?  and last time she had a pt/inr done before today

## 2019-02-13 ENCOUNTER — ANTICOAG VISIT (OUTPATIENT)
Dept: FAMILY MEDICINE CLINIC | Facility: CLINIC | Age: 34
End: 2019-02-13

## 2019-02-13 ENCOUNTER — TELEPHONE (OUTPATIENT)
Dept: FAMILY MEDICINE CLINIC | Facility: CLINIC | Age: 34
End: 2019-02-13

## 2019-02-13 DIAGNOSIS — I26.99 OTHER PULMONARY EMBOLISM WITHOUT ACUTE COR PULMONALE, UNSPECIFIED CHRONICITY (HCC): ICD-10-CM

## 2019-02-13 DIAGNOSIS — I82.409 DEEP VEIN THROMBOSIS (DVT) OF LOWER EXTREMITY, UNSPECIFIED CHRONICITY, UNSPECIFIED LATERALITY, UNSPECIFIED VEIN (HCC): ICD-10-CM

## 2019-02-13 LAB — INR PPP: 1.76 (ref 0.86–1.17)

## 2019-02-13 NOTE — TELEPHONE ENCOUNTER
Left message to call back  Need to know if still on Metformin  What strength and how often she is taking

## 2019-02-13 NOTE — TELEPHONE ENCOUNTER
WE need to now address the PT/INR  Can send to [facility]     A1C 9 8 so need to improve this  Clarify if taking the metformin   If so advise we will need to add in another med since out of control

## 2019-02-15 ENCOUNTER — TELEPHONE (OUTPATIENT)
Dept: FAMILY MEDICINE CLINIC | Facility: CLINIC | Age: 34
End: 2019-02-15

## 2019-02-15 DIAGNOSIS — E11.9 TYPE 2 DIABETES MELLITUS WITHOUT COMPLICATION, WITHOUT LONG-TERM CURRENT USE OF INSULIN (HCC): Primary | ICD-10-CM

## 2019-02-15 NOTE — TELEPHONE ENCOUNTER
Called pt and left message that her Marline Ovalle was not covered by her insurance and Corazon sent into the pharmacy trajenta and I advised the pt to call back to let me know that she got this message

## 2019-02-18 DIAGNOSIS — I26.99 OTHER ACUTE PULMONARY EMBOLISM WITHOUT ACUTE COR PULMONALE (HCC): Primary | ICD-10-CM

## 2019-02-19 RX ORDER — WARFARIN SODIUM 10 MG/1
TABLET ORAL
Qty: 30 TABLET | Refills: 3 | Status: SHIPPED | OUTPATIENT
Start: 2019-02-19 | End: 2019-06-25 | Stop reason: SDUPTHER

## 2019-02-19 NOTE — TELEPHONE ENCOUNTER
Entire message left on patient voicemail - patient works until 5:30 pm and unable to answer calls while at work

## 2019-02-21 ENCOUNTER — TELEPHONE (OUTPATIENT)
Dept: FAMILY MEDICINE CLINIC | Facility: CLINIC | Age: 34
End: 2019-02-21

## 2019-02-21 NOTE — TELEPHONE ENCOUNTER
Patient returning your call about her lab results she said to call her at [phone] she said to leave the results on her phone she doesn't want to keep playing phone tag  TY

## 2019-02-21 NOTE — TELEPHONE ENCOUNTER
Spoke to pt and she states that trajenta is very expensive and she will call her insurance to see what is covered but she also wanted for you to be aware that she is not taking [place] or trajenta

## 2019-02-22 ENCOUNTER — TELEPHONE (OUTPATIENT)
Dept: FAMILY MEDICINE CLINIC | Facility: CLINIC | Age: 34
End: 2019-02-22

## 2019-02-22 DIAGNOSIS — E11.9 TYPE 2 DIABETES MELLITUS WITHOUT COMPLICATION, WITHOUT LONG-TERM CURRENT USE OF INSULIN (HCC): Primary | ICD-10-CM

## 2019-02-22 RX ORDER — GLIMEPIRIDE 2 MG/1
2 TABLET ORAL
Qty: 30 TABLET | Refills: 3 | Status: SHIPPED | OUTPATIENT
Start: 2019-02-22 | End: 2019-04-17 | Stop reason: SDUPTHER

## 2019-02-22 NOTE — TELEPHONE ENCOUNTER
Patient called she said she call the insurance and they cover glimepiride   If you can you please look over it thank you

## 2019-02-22 NOTE — TELEPHONE ENCOUNTER
Change from [place] [facility] [place] to trajenta due to insurance   This is too expensive too  Advise her to call insurance and see what they will cover so we do not keep just calling in things that aren't covered

## 2019-02-26 ENCOUNTER — TELEPHONE (OUTPATIENT)
Dept: FAMILY MEDICINE CLINIC | Facility: CLINIC | Age: 34
End: 2019-02-26

## 2019-02-26 NOTE — TELEPHONE ENCOUNTER
Infant resting in humidified isolette, VSS. Remains on Vapotherm support, 3lpm at 38%FiO2. Tachypneic with mild retractions and occasional dips of sats into low to mid 80s with care; resolve quickly. Tolerating gavage fdgs of 24cal DEBM; abd soft, no residual noted. Voiding, no stool this shift. Mother called, status updated and questions answered. NICView camera on at bedside.     Patient called she said that she is on Amaryl 2 mg she wants to know if she is also be taking the metformin along with that medication patient said to call her at [phone] and to leave the information on her phone  TY

## 2019-03-25 DIAGNOSIS — I82.5Y9 CHRONIC DEEP VEIN THROMBOSIS (DVT) OF PROXIMAL VEIN OF LOWER EXTREMITY, UNSPECIFIED LATERALITY (HCC): ICD-10-CM

## 2019-03-25 NOTE — TELEPHONE ENCOUNTER
Pt called stating she needs a refill on her Coumadin 4mg refilled  She is out of it  Pharmacy is on file  Pt states she also needs a script for her PT INR

## 2019-03-26 ENCOUNTER — TELEPHONE (OUTPATIENT)
Dept: FAMILY MEDICINE CLINIC | Facility: CLINIC | Age: 34
End: 2019-03-26

## 2019-03-26 RX ORDER — WARFARIN SODIUM 4 MG/1
4 TABLET ORAL DAILY
Qty: 90 TABLET | Refills: 3 | Status: SHIPPED | OUTPATIENT
Start: 2019-03-26 | End: 2019-09-13 | Stop reason: SDUPTHER

## 2019-04-17 DIAGNOSIS — E11.9 TYPE 2 DIABETES MELLITUS WITHOUT COMPLICATION, WITHOUT LONG-TERM CURRENT USE OF INSULIN (HCC): ICD-10-CM

## 2019-04-17 RX ORDER — GLIMEPIRIDE 2 MG/1
2 TABLET ORAL
Qty: 30 TABLET | Refills: 3 | Status: SHIPPED | OUTPATIENT
Start: 2019-04-17 | End: 2019-04-18 | Stop reason: SDUPTHER

## 2019-04-18 DIAGNOSIS — E11.9 TYPE 2 DIABETES MELLITUS WITHOUT COMPLICATION, WITHOUT LONG-TERM CURRENT USE OF INSULIN (HCC): ICD-10-CM

## 2019-04-18 RX ORDER — GLIMEPIRIDE 2 MG/1
2 TABLET ORAL
Qty: 90 TABLET | Refills: 3 | Status: SHIPPED | OUTPATIENT
Start: 2019-04-18 | End: 2019-06-11 | Stop reason: ALTCHOICE

## 2019-04-24 ENCOUNTER — TELEPHONE (OUTPATIENT)
Dept: FAMILY MEDICINE CLINIC | Facility: CLINIC | Age: 34
End: 2019-04-24

## 2019-05-29 ENCOUNTER — APPOINTMENT (OUTPATIENT)
Dept: LAB | Age: 34
End: 2019-05-29
Payer: COMMERCIAL

## 2019-05-29 DIAGNOSIS — I82.5Y9 CHRONIC DEEP VEIN THROMBOSIS (DVT) OF PROXIMAL VEIN OF LOWER EXTREMITY, UNSPECIFIED LATERALITY (HCC): ICD-10-CM

## 2019-05-29 LAB
INR PPP: 1.73 (ref 0.86–1.17)
INR PPP: 1.73 (ref 0.86–1.17)
PROTHROMBIN TIME: 20.3 SECONDS (ref 11.8–14.2)

## 2019-05-29 PROCEDURE — 85610 PROTHROMBIN TIME: CPT

## 2019-05-29 PROCEDURE — 36415 COLL VENOUS BLD VENIPUNCTURE: CPT

## 2019-05-30 ENCOUNTER — ANTICOAG VISIT (OUTPATIENT)
Dept: FAMILY MEDICINE CLINIC | Facility: CLINIC | Age: 34
End: 2019-05-30

## 2019-05-30 DIAGNOSIS — I26.99 OTHER PULMONARY EMBOLISM WITHOUT ACUTE COR PULMONALE, UNSPECIFIED CHRONICITY (HCC): ICD-10-CM

## 2019-05-30 DIAGNOSIS — I82.409 DEEP VEIN THROMBOSIS (DVT) OF LOWER EXTREMITY, UNSPECIFIED CHRONICITY, UNSPECIFIED LATERALITY, UNSPECIFIED VEIN (HCC): ICD-10-CM

## 2019-06-09 DIAGNOSIS — L30.9 ECZEMA, UNSPECIFIED TYPE: ICD-10-CM

## 2019-06-11 ENCOUNTER — OFFICE VISIT (OUTPATIENT)
Dept: FAMILY MEDICINE CLINIC | Facility: CLINIC | Age: 34
End: 2019-06-11
Payer: COMMERCIAL

## 2019-06-11 VITALS
OXYGEN SATURATION: 97 % | WEIGHT: 290.9 LBS | DIASTOLIC BLOOD PRESSURE: 100 MMHG | HEART RATE: 92 BPM | BODY MASS INDEX: 46.75 KG/M2 | TEMPERATURE: 96.5 F | HEIGHT: 66 IN | SYSTOLIC BLOOD PRESSURE: 124 MMHG

## 2019-06-11 DIAGNOSIS — R21 RASH: Primary | ICD-10-CM

## 2019-06-11 PROCEDURE — 3008F BODY MASS INDEX DOCD: CPT | Performed by: FAMILY MEDICINE

## 2019-06-11 PROCEDURE — 1036F TOBACCO NON-USER: CPT | Performed by: FAMILY MEDICINE

## 2019-06-11 PROCEDURE — 99213 OFFICE O/P EST LOW 20 MIN: CPT | Performed by: FAMILY MEDICINE

## 2019-06-25 DIAGNOSIS — I26.99 OTHER ACUTE PULMONARY EMBOLISM WITHOUT ACUTE COR PULMONALE (HCC): ICD-10-CM

## 2019-06-25 RX ORDER — WARFARIN SODIUM 10 MG/1
TABLET ORAL
Qty: 30 TABLET | Refills: 3 | Status: SHIPPED | OUTPATIENT
Start: 2019-06-25 | End: 2019-06-27 | Stop reason: SDUPTHER

## 2019-06-27 ENCOUNTER — ANTICOAG VISIT (OUTPATIENT)
Dept: FAMILY MEDICINE CLINIC | Facility: CLINIC | Age: 34
End: 2019-06-27

## 2019-06-27 DIAGNOSIS — I82.409 DEEP VEIN THROMBOSIS (DVT) OF LOWER EXTREMITY, UNSPECIFIED CHRONICITY, UNSPECIFIED LATERALITY, UNSPECIFIED VEIN (HCC): ICD-10-CM

## 2019-06-27 DIAGNOSIS — I26.99 OTHER ACUTE PULMONARY EMBOLISM WITHOUT ACUTE COR PULMONALE (HCC): ICD-10-CM

## 2019-06-27 DIAGNOSIS — I26.99 OTHER PULMONARY EMBOLISM WITHOUT ACUTE COR PULMONALE, UNSPECIFIED CHRONICITY (HCC): ICD-10-CM

## 2019-06-27 RX ORDER — WARFARIN SODIUM 10 MG/1
TABLET ORAL
Qty: 30 TABLET | Refills: 12 | Status: SHIPPED | OUTPATIENT
Start: 2019-06-27 | End: 2019-07-31 | Stop reason: SDUPTHER

## 2019-06-27 NOTE — TELEPHONE ENCOUNTER
Patient is calling for a refill on coumadin 10mg she is out of them and her last inr drawn was 05/29   She has not had any inr checked now in June please advise, and she is going on vacation tomorrow

## 2019-07-31 DIAGNOSIS — I26.99 OTHER PULMONARY EMBOLISM WITHOUT ACUTE COR PULMONALE, UNSPECIFIED CHRONICITY (HCC): Primary | ICD-10-CM

## 2019-07-31 DIAGNOSIS — I26.99 OTHER ACUTE PULMONARY EMBOLISM WITHOUT ACUTE COR PULMONALE (HCC): ICD-10-CM

## 2019-07-31 RX ORDER — WARFARIN SODIUM 10 MG/1
TABLET ORAL
Qty: 30 TABLET | Refills: 12 | Status: SHIPPED | OUTPATIENT
Start: 2019-07-31 | End: 2019-08-22 | Stop reason: SDUPTHER

## 2019-07-31 NOTE — TELEPHONE ENCOUNTER
Pt called stating she needs a refill on her Warfarin 10mg sent to [facility] on [address]  Pt also needs a new script for her PT INR so she can get it done

## 2019-08-01 ENCOUNTER — APPOINTMENT (OUTPATIENT)
Dept: RADIOLOGY | Age: 34
End: 2019-08-01
Attending: PHYSICIAN ASSISTANT
Payer: COMMERCIAL

## 2019-08-01 ENCOUNTER — OFFICE VISIT (OUTPATIENT)
Dept: URGENT CARE | Age: 34
End: 2019-08-01
Payer: COMMERCIAL

## 2019-08-01 VITALS
SYSTOLIC BLOOD PRESSURE: 135 MMHG | DIASTOLIC BLOOD PRESSURE: 82 MMHG | HEIGHT: 65 IN | RESPIRATION RATE: 16 BRPM | HEART RATE: 105 BPM | BODY MASS INDEX: 48.32 KG/M2 | OXYGEN SATURATION: 96 % | WEIGHT: 290 LBS

## 2019-08-01 DIAGNOSIS — R52 PAIN: ICD-10-CM

## 2019-08-01 DIAGNOSIS — M25.571 ACUTE RIGHT ANKLE PAIN: Primary | ICD-10-CM

## 2019-08-01 PROCEDURE — 99213 OFFICE O/P EST LOW 20 MIN: CPT | Performed by: FAMILY MEDICINE

## 2019-08-01 PROCEDURE — 73610 X-RAY EXAM OF ANKLE: CPT

## 2019-08-01 RX ORDER — GLIMEPIRIDE 2 MG/1
2 TABLET ORAL
COMMUNITY
End: 2020-01-29 | Stop reason: ALTCHOICE

## 2019-08-01 NOTE — PATIENT INSTRUCTIONS
RICE- rest, ice, compression, elevation  Aircast while awake and cane/nonweightbearing for comfort  Call tomorrow for official x-ray read  Tylenol for pain and swelling as needed  Call Ortho today and follow-up with them in the next 1-2 days for further evaluation and treatment  Call Luzma Mckeon to schedule an appointment: [phone]  If any new or worsening symptoms occur go immediately to the ER

## 2019-08-01 NOTE — PROGRESS NOTES
[facility]        NAME: Celso Pittman is a 29 y o  female  : 1985    MRN: [id number]  DATE: 2019  TIME: 7:02 PM    Assessment and Plan   Acute right ankle pain [M25 571]  1  Acute right ankle pain  XR ankle 3+ vw right    Ambulatory referral to Orthopedic Surgery    Ankle Air Cast    Cane     Xray- negative for acute osseous abnormality, some chronic appearing changes such as some calcifications and potential bone cyst, pending final read  Aircast and cane- placed by medical staff for comfort, NV intact post-splinting    Patient Instructions     RICE- rest, ice, compression, elevation  Aircast while awake and cane/nonweightbearing for comfort  Call tomorrow for official x-ray read  Tylenol for pain and swelling as needed  Call Ortho today and follow-up with them in the next 1-2 days for further evaluation and treatment  Call Luzma Mckeon to schedule an appointment: [phone]  If any new or worsening symptoms occur go immediately to the ER  Chief Complaint     Chief Complaint   Patient presents with    Ankle Pain     right ankle pain like a needle and giving out multiple times    able to walk         History of Present Illness       35-year-old female presents with right ankle pain after twisting her ankle 1 week ago  Patient states since then she feels that her ankle sometimes gives out and she continues to twist it  Denies any other trauma or injury  She has tried Tylenol and ice for the pain with little relief  Pain is worse with walking or palpation, better at rest   States feels better when she is in a hard shoe with support, worse with flip-flops or barefoot  She denies any numbness, tingling, weakness, calf pain or swelling, foot pain or other complaints  She denies any pregnancy risk  denies any prior injuries to the ankle  Review of Systems   Review of Systems   Constitutional: Negative for chills, fatigue and fever     Respiratory: Negative for cough and shortness of breath  Cardiovascular: Negative for chest pain and leg swelling  Musculoskeletal: Positive for arthralgias (Right ankle pain)  Negative for back pain, joint swelling and neck pain  Skin: Negative for rash and wound  Neurological: Negative for dizziness, weakness, numbness and headaches  All other systems reviewed and are negative  Current Medications       Current Outpatient Medications:     ALPRAZolam (XANAX) 0 25 mg tablet, Take 1 tablet (0 25 mg total) by mouth 2 (two) times a day as needed for anxiety, Disp: 20 tablet, Rfl: 0    Blood Glucose Monitoring Suppl MISC, test one time daily, Disp: , Rfl:     clobetasol (TEMOVATE) 0 05 % ointment, Apply topically 2 (two) times a day For max of 2 weeks at a time  , Disp: 45 g, Rfl: 0    glimepiride (AMARYL) 2 mg tablet, Take 2 mg by mouth every morning before breakfast, Disp: , Rfl:     glucose blood test strip, test one time daily, Disp: , Rfl:     Lancets MISC, test one time daily, Disp: , Rfl:     metFORMIN (GLUCOPHAGE) 1000 MG tablet, TAKE 1 TABLET BY MOUTH EVERY DAY WITH THE MORNING AND EVENING MEAL, Disp: 180 tablet, Rfl: 3    warfarin (COUMADIN) 10 mg tablet, Take 1 tablet by oral route every Monday;  Wednesday and Friday, Disp: 30 tablet, Rfl: 12    warfarin (COUMADIN) 4 mg tablet, Take 1 tablet (4 mg total) by mouth daily (Patient not taking: Reported on 8/1/2019), Disp: 90 tablet, Rfl: 3    warfarin (COUMADIN) 7 5 mg tablet, TK 1 T PO QD, Disp: , Rfl:     Current Allergies     Allergies as of 08/01/2019 - Reviewed 08/01/2019   Allergen Reaction Noted    Other  08/01/2019            The following portions of the patient's history were reviewed and updated as appropriate: allergies, current medications, past family history, past medical history, past social history, past surgical history and problem list      Past Medical History:   Diagnosis Date    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     Epistaxis     [facility] Leiden mutation [facility]     Herpes zoster     Hypotension     Menorrhagia        Past Surgical History:   Procedure Laterality Date    DILATION AND CURETTAGE OF UTERUS      VENA CAVA FILTER PLACEMENT      INTERRUPTION INFERIOR, GREENFIRELD FILTER PLACEMENT       Family History   Problem Relation Age of Onset    Diabetes Father     Cancer Family     Stroke Family     Heart disease Family     Hypercoagulant Ability Family     Factor V Leiden deficiency Family         MUTATION         Medications have been verified  Objective   /82   Pulse 105   Resp 16   Ht 5' 5" (1 651 m)   Wt 132 kg (290 lb)   SpO2 96%   BMI 48 26 kg/m²        Physical Exam     Physical Exam   Constitutional: She is oriented to person, place, and time  She appears well-developed and well-nourished  No distress  Patient is ambulatory   Cardiovascular: Normal rate, regular rhythm and normal heart sounds  Pulmonary/Chest: Effort normal and breath sounds normal    Musculoskeletal:        Right ankle: She exhibits normal range of motion, no swelling, no deformity and normal pulse  Tenderness (Mild, diffuse, nonspecific tenderness to bilateral malleoli and ligamental region as well as the heel)  No head of 5th metatarsal and no proximal fibula tenderness found  Achilles tendon exhibits normal Conrad's test results  Right foot: Normal  There is normal range of motion, no tenderness, no bony tenderness, no swelling, normal capillary refill and no deformity  Dorsiflexion/plantar flexion intact  No calf swelling or tenderness to palpation   Neurological: She is alert and oriented to person, place, and time  She has normal reflexes  No sensory deficit  NV intact with sensation and strong peripheral pulses  5/5 strength of LE bilaterally   Skin: Skin is warm and dry  Capillary refill takes less than 2 seconds  Psychiatric: She has a normal mood and affect   Her behavior is normal    Nursing note and vitals reviewed

## 2019-08-05 ENCOUNTER — OFFICE VISIT (OUTPATIENT)
Dept: OBGYN CLINIC | Facility: OTHER | Age: 34
End: 2019-08-05
Payer: COMMERCIAL

## 2019-08-05 VITALS — WEIGHT: 290 LBS | HEIGHT: 65 IN | BODY MASS INDEX: 48.32 KG/M2

## 2019-08-05 DIAGNOSIS — M25.571 ACUTE RIGHT ANKLE PAIN: ICD-10-CM

## 2019-08-05 DIAGNOSIS — S93.401A SPRAIN OF UNSPECIFIED LIGAMENT OF RIGHT ANKLE, INITIAL ENCOUNTER: Primary | ICD-10-CM

## 2019-08-05 DIAGNOSIS — E66.01 MORBID OBESITY WITH BMI OF 45.0-49.9, ADULT (HCC): ICD-10-CM

## 2019-08-05 PROCEDURE — 99203 OFFICE O/P NEW LOW 30 MIN: CPT | Performed by: ORTHOPAEDIC SURGERY

## 2019-08-05 NOTE — LETTER
August 5, 2019     Patient: Kat Ballard   YOB: 1985   Date of Visit: 8/5/2019       To Whom it May Concern:    Traci Alaniz is under my professional care  She was seen in my office on 8/5/2019  She has a sprain of her right ankle and was fitted with a brace and started on a home exercise program   She can work full-time at her job without restrictions  If you have any questions or concerns, please don't hesitate to call           Sincerely,          Darrian Neal MD        CC: No Recipients

## 2019-08-05 NOTE — PATIENT INSTRUCTIONS
Plan :  I reassured the patient that she only had a sprained ankle and that the x-rays are normal without any degenerative change, or bone chips around the ankle  This is treated conservatively and I fitted her with a lace-up ankle brace which was quite comfortable  I want her to wear this up when she is up and around for the next month at least but she may take it off for bathing and for sleeping  She should put ice on the area in a small trash bag or bag of frozen peas for 15 minutes, 4 times a day, if needed for pain or swelling  She can only take Tylenol for pain as she is on Coumadin long-term prophylaxis  I showed her a strong home exercise program to do that I want her doing at least once a day every day and this will help  She can certainly continue working full-time without restrictions and I gave her note to that effect    I would like to see her back again here in about 6 weeks to make sure that her symptoms have totally quieted

## 2019-08-05 NOTE — LETTER
August 5, 2019     Sue Diaz, [address]    Patient: Mak Golden   YOB: 1985   Date of Visit: 8/5/2019       Dear Ms Alexander: Thank you for referring Jason Guzman to me for evaluation  Below are my notes for this consultation  If you have questions, please do not hesitate to call me  I look forward to following your patient along with you  Sincerely,        Slim Tipton MD        CC: No Recipients  Slim Tipton MD  8/5/2019  3:32 PM  Sign at close encounter  Chief Complaint   Patient presents with    Right Ankle - Pain           Assessment:  Grade 1 sprain right lateral ankle    Plan :  I reassured the patient that she only had a sprained ankle and that the x-rays are normal without any degenerative change, or bone chips around the ankle  This is treated conservatively and I fitted her with a lace-up ankle brace which was quite comfortable  I want her to wear this up when she is up and around for the next month at least but she may take it off for bathing and for sleeping  She should put ice on the area in a small trash bag or bag of frozen peas for 15 minutes, 4 times a day, if needed for pain or swelling  She can only take Tylenol for pain as she is on Coumadin long-term prophylaxis  I showed her a strong home exercise program to do that I want her doing at least once a day every day and this will help  She can certainly continue working full-time without restrictions and I gave her note to that effect  I would like to see her back again here in about 6 weeks to make sure that her symptoms have totally quieted    HPI:     This 28-year-old white female comes in for evaluation of right ankle pain  This occurred about a week ago when she twisted it and she went to urgent care  She was fitted with an air cast splint but was uncomfortable and she took it off  She is wearing some type of elastic wrap    She has no true giving way or locking  She still has mild swelling  She denies numbness, tingling, or paresthesias  She has no knee pain      PMHx:         Past Medical History:   Diagnosis Date    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[facility] )     Epistaxis     Factor V Leiden mutation ([facility] )     Herpes zoster     Hypotension     Menorrhagia        Past Surgical History:   Procedure Laterality Date    DILATION AND CURETTAGE OF UTERUS      VENA CAVA FILTER PLACEMENT      INTERRUPTION INFERIOR, GREENFIRELD FILTER PLACEMENT       Family History   Problem Relation Age of Onset    Diabetes Father     Cancer Family     Stroke Family     Heart disease Family     Hypercoagulant Ability Family     Factor V Leiden deficiency Family         MUTATION       Social History     Socioeconomic History    Marital status: Single     Spouse name: Not on file    Number of children: Not on file    Years of education: Not on file    Highest education level: Not on file   Occupational History    Occupation:    Social Needs    Financial resource strain: Not on file    Food insecurity:     Worry: Not on file     Inability: Not on file    Transportation needs:     Medical: Not on file     Non-medical: Not on file   Tobacco Use    Smoking status: Former Smoker     Last attempt to quit: 2018     Years since quittin 5    Smokeless tobacco: Former User     Quit date: 2018    Tobacco comment: TOBACCO USE   Substance and Sexual Activity    Alcohol use: Yes     Frequency: 2-4 times a month     Comment: rarely    Drug use: No    Sexual activity: Not on file   Lifestyle    Physical activity:     Days per week: Not on file     Minutes per session: Not on file    Stress: Not on file   Relationships    Social connections:     Talks on phone: Not on file     Gets together: Not on file     Attends Orthodoxy service: Not on file     Active member of club or organization: Not on file     Attends meetings of clubs or organizations: Not on file     Relationship status: Not on file    Intimate partner violence:     Fear of current or ex partner: Not on file     Emotionally abused: Not on file     Physically abused: Not on file     Forced sexual activity: Not on file   Other Topics Concern    Not on file   Social History Narrative    Not on file       Current Outpatient Medications   Medication Sig Dispense Refill    ALPRAZolam (XANAX) 0 25 mg tablet Take 1 tablet (0 25 mg total) by mouth 2 (two) times a day as needed for anxiety 20 tablet 0    Blood Glucose Monitoring Suppl MISC test one time daily      clobetasol (TEMOVATE) 0 05 % ointment Apply topically 2 (two) times a day For max of 2 weeks at a time  45 g 0    glimepiride (AMARYL) 2 mg tablet Take 2 mg by mouth every morning before breakfast      glucose blood test strip test one time daily      Lancets MISC test one time daily      metFORMIN (GLUCOPHAGE) 1000 MG tablet TAKE 1 TABLET BY MOUTH EVERY DAY WITH THE MORNING AND EVENING MEAL 180 tablet 3    warfarin (COUMADIN) 10 mg tablet Take 1 tablet by oral route every Monday; Wednesday and Friday 30 tablet 12    warfarin (COUMADIN) 4 mg tablet Take 1 tablet (4 mg total) by mouth daily 90 tablet 3    warfarin (COUMADIN) 7 5 mg tablet TK 1 T PO QD       No current facility-administered medications for this visit  Allergies: Other    ROS:  Positive for blood sugar issues, history of pulmonary embolism on Coumadin, skin rash, factor 5 Leiden mutation, nose bleeds, anxiety, and orthopedic complaints as above  The remaining 7/12 systems on the intake sheet that I reviewed were negative  PE:  Ht 5' 5" (1 651 m)   Wt 132 kg (290 lb)   BMI 48 26 kg/m²    Constitutional: The patient was  oriented to person, place, and time  She was heavy  In no acute distress  HEENT: Vision intact  Hearing normal  Swallowing normal   Head: Normocephalic  Cardiovascular: Intact distal pulses  Pulse regular    Pulmonary/Chest: Effort normal  No respiratory distress  Neurological: Alert and oriented to person, place, and time  Skin: Skin is warm  Psychiatric: Normal mood and affect  Ortho Exam:  She was limping using elastic wrap on her right ankle  She had no cane or crutches  She had 1+ swelling of her ankle with no ecchymosis or redness  She was tender over the anterior talofibular ligament, not the calcaneal fibular ligament  She was nontender medially over the proximal fibula  She had no deformity of her ankle  She had good ankle plantar and dorsiflexion with some pain on inversion stress without laxity  She had negative anterior drawer test   Dorsalis pedis pulse was intact as was sensation in the toes of the right foot  She had large calves without palpable cords or tenderness  Studies reviewed:   I personally reviewed right ankle x-rays as well as the radiologist's report  There is no fracture or dislocation  The tibiotalar joint is preserved with no widening of the medial clear space  She has some small ossicles at the Achilles tendon insertion of posterior calcaneus an inferior calcaneal spur    There are no lytic areas in the ankle joint itself

## 2019-08-06 ENCOUNTER — TELEPHONE (OUTPATIENT)
Dept: OBGYN CLINIC | Facility: HOSPITAL | Age: 34
End: 2019-08-06

## 2019-08-06 NOTE — TELEPHONE ENCOUNTER
Caller: Patient, Saray Wylie  Call back number: [phone]  Provider: Dr Jade Roach    Patient has called and advised that she will be a faxing a form to [facility] that has specific requirements that her job is asking for Dr Jade Roach to check and to have his signature on in regards to her release back to work

## 2019-08-06 NOTE — TELEPHONE ENCOUNTER
I gave her a note yesterday saying she could return to work without restrictions    I would be more than happy to fill out any additional forms that she needs

## 2019-08-22 DIAGNOSIS — I26.99 OTHER ACUTE PULMONARY EMBOLISM WITHOUT ACUTE COR PULMONALE (HCC): ICD-10-CM

## 2019-08-23 RX ORDER — WARFARIN SODIUM 10 MG/1
TABLET ORAL
Qty: 30 TABLET | Refills: 12 | Status: SHIPPED | OUTPATIENT
Start: 2019-08-23 | End: 2019-12-31 | Stop reason: SDUPTHER

## 2019-09-08 DIAGNOSIS — L30.9 ECZEMA, UNSPECIFIED TYPE: ICD-10-CM

## 2019-09-13 DIAGNOSIS — I82.5Y9 CHRONIC DEEP VEIN THROMBOSIS (DVT) OF PROXIMAL VEIN OF LOWER EXTREMITY, UNSPECIFIED LATERALITY (HCC): ICD-10-CM

## 2019-09-13 RX ORDER — WARFARIN SODIUM 4 MG/1
4 TABLET ORAL DAILY
Qty: 90 TABLET | Refills: 3 | Status: SHIPPED | OUTPATIENT
Start: 2019-09-13 | End: 2020-01-29 | Stop reason: ALTCHOICE

## 2019-09-13 RX ORDER — WARFARIN SODIUM 4 MG/1
4 TABLET ORAL DAILY
Qty: 90 TABLET | Refills: 0 | Status: CANCELLED | OUTPATIENT
Start: 2019-09-13

## 2019-09-16 ENCOUNTER — ANTICOAG VISIT (OUTPATIENT)
Dept: FAMILY MEDICINE CLINIC | Facility: CLINIC | Age: 34
End: 2019-09-16

## 2019-09-16 DIAGNOSIS — I26.99 OTHER PULMONARY EMBOLISM WITHOUT ACUTE COR PULMONALE, UNSPECIFIED CHRONICITY (HCC): ICD-10-CM

## 2019-09-16 DIAGNOSIS — I82.409 DEEP VEIN THROMBOSIS (DVT) OF LOWER EXTREMITY, UNSPECIFIED CHRONICITY, UNSPECIFIED LATERALITY, UNSPECIFIED VEIN (HCC): ICD-10-CM

## 2019-09-16 DIAGNOSIS — I26.99 OTHER ACUTE PULMONARY EMBOLISM WITHOUT ACUTE COR PULMONALE (HCC): ICD-10-CM

## 2019-09-16 RX ORDER — WARFARIN SODIUM 10 MG/1
TABLET ORAL
Qty: 90 TABLET | Refills: 3 | OUTPATIENT
Start: 2019-09-16

## 2019-09-16 NOTE — PROGRESS NOTES
Patient informed that she needs pt/inr done  Last one was 6/26/19   She ran out of warfarin  So she will go to get pt/inr after she has started back on her warfarin for a couple days

## 2019-11-27 ENCOUNTER — OFFICE VISIT (OUTPATIENT)
Dept: URGENT CARE | Age: 34
End: 2019-11-27

## 2019-11-27 VITALS
HEIGHT: 65 IN | TEMPERATURE: 97.3 F | DIASTOLIC BLOOD PRESSURE: 92 MMHG | HEART RATE: 110 BPM | BODY MASS INDEX: 46.69 KG/M2 | RESPIRATION RATE: 16 BRPM | SYSTOLIC BLOOD PRESSURE: 138 MMHG | WEIGHT: 280.2 LBS | OXYGEN SATURATION: 96 %

## 2019-11-27 DIAGNOSIS — J01.00 ACUTE NON-RECURRENT MAXILLARY SINUSITIS: Primary | ICD-10-CM

## 2019-11-27 PROCEDURE — G0382 LEV 3 HOSP TYPE B ED VISIT: HCPCS | Performed by: PHYSICIAN ASSISTANT

## 2019-11-27 NOTE — PATIENT INSTRUCTIONS
Start antibiotics as directed  1  Drink plenty fluids  2   Take probiotics [i e  Yogurt, Acidophilus, Florastor (liquid)] daily  3   Over-the-counter medications as needed for symptomatic care  4    Advance activities as tolerated  5    Follow-up with your primary care physician in 3-4 days  6   Go to emergency room if symptoms are worsening

## 2019-11-27 NOTE — PROGRESS NOTES
[facility]        NAME: Carlos Perla is a 29 y o  female  : 1985    MRN: [id number]  DATE: 2019  TIME: 5:44 PM    Assessment and Plan   Acute non-recurrent maxillary sinusitis [J01 00]  1  Acute non-recurrent maxillary sinusitis           Patient Instructions     -start antibiotics as directed  Follow up with PCP in 3-5 days  Proceed to  ER if symptoms worsen  Chief Complaint     Chief Complaint   Patient presents with    Facial Pain     sinus pressure increasing over the past two weeks, has been taking tylenol, dayquil, w/ no change  Feels she needs abx or "something stronger"    Cough     no sore throat, but has had a cough, "wheezy" x 2 weeks         History of Present Illness       Patient presents with a week and half of sinus pain and pressure, cough  She states the pressure is getting worse in her sinuses  She also does have a headache  She denies any fevers  She has tried over-the-counter cold medicines without relief  Review of Systems   Review of Systems   Constitutional: Negative  HENT: Positive for congestion, sinus pressure and sinus pain  Negative for sore throat  Respiratory: Positive for cough  Cardiovascular: Negative  Gastrointestinal: Negative  Musculoskeletal: Negative  Neurological: Positive for headaches  Psychiatric/Behavioral: Negative  Current Medications       Current Outpatient Medications:     ALPRAZolam (XANAX) 0 25 mg tablet, Take 1 tablet (0 25 mg total) by mouth 2 (two) times a day as needed for anxiety, Disp: 20 tablet, Rfl: 0    Blood Glucose Monitoring Suppl MISC, test one time daily, Disp: , Rfl:     clobetasol (TEMOVATE) 0 05 % ointment, Apply topically 2 (two) times a day For max of 2 weeks at a time  , Disp: 45 g, Rfl: 0    glimepiride (AMARYL) 2 mg tablet, Take 2 mg by mouth every morning before breakfast, Disp: , Rfl:     glucose blood test strip, test one time daily, Disp: , Rfl:     Lancets MISC, test one time daily, Disp: , Rfl:     metFORMIN (GLUCOPHAGE) 1000 MG tablet, TAKE 1 TABLET BY MOUTH EVERY DAY WITH THE MORNING AND EVENING MEAL, Disp: 180 tablet, Rfl: 3    triamcinolone (KENALOG) 0 1 % ointment, APPLY TO AFFECTED AREA TWICE A DAY, Disp: 30 g, Rfl: 0    warfarin (COUMADIN) 10 mg tablet, Take 1 tablet by oral route every Monday; Wednesday and Friday, Disp: 30 tablet, Rfl: 12    warfarin (COUMADIN) 4 mg tablet, Take 1 tablet (4 mg total) by mouth daily (Patient not taking: Reported on 11/27/2019), Disp: 90 tablet, Rfl: 3    warfarin (COUMADIN) 7 5 mg tablet, TK 1 T PO QD, Disp: , Rfl:     Current Allergies     Allergies as of 11/27/2019 - Reviewed 11/27/2019   Allergen Reaction Noted    No known allergies  02/20/2018    Other  08/01/2019            The following portions of the patient's history were reviewed and updated as appropriate: allergies, current medications, past family history, past medical history, past social history, past surgical history and problem list      Past Medical History:   Diagnosis Date    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[facility] )     Epistaxis     Factor V Leiden mutation ([facility] )     Herpes zoster     Hypotension     Menorrhagia     Morbid obesity with BMI of 45 0-49 9, adult ([facility] ) 8/5/2019       Past Surgical History:   Procedure Laterality Date    DILATION AND CURETTAGE OF UTERUS      VENA CAVA FILTER PLACEMENT      INTERRUPTION INFERIOR, GREENFIRELD FILTER PLACEMENT       Family History   Problem Relation Age of Onset    Diabetes Father     Cancer Family     Stroke Family     Heart disease Family     Hypercoagulant Ability Family     Factor V Leiden deficiency Family         MUTATION         Medications have been verified          Objective   /92 (BP Location: Right arm, Patient Position: Sitting)   Pulse (!) 110   Temp (!) 97 3 °F (36 3 °C) (Temporal)   Resp 16   Ht 5' 5" (1 651 m)   Wt 127 kg (280 lb 3 2 oz)   SpO2 96%   BMI 46 63 kg/m² Physical Exam     Physical Exam   Constitutional: She is oriented to person, place, and time  She appears well-developed and well-nourished  No distress  HENT:   Head: Normocephalic and atraumatic  Right Ear: External ear normal    Left Ear: External ear normal    Nose: Nose normal    Mouth/Throat: Oropharynx is clear and moist    Tenderness outpatient over frontal and maxillary sinuses   Cardiovascular: Normal rate, regular rhythm and normal heart sounds  Pulmonary/Chest: Effort normal and breath sounds normal    Neurological: She is alert and oriented to person, place, and time  Skin: Skin is warm and dry  She is not diaphoretic  Psychiatric: She has a normal mood and affect  Her behavior is normal    Nursing note and vitals reviewed

## 2019-12-04 DIAGNOSIS — L30.9 ECZEMA, UNSPECIFIED TYPE: ICD-10-CM

## 2019-12-24 ENCOUNTER — TELEPHONE (OUTPATIENT)
Dept: FAMILY MEDICINE CLINIC | Facility: CLINIC | Age: 34
End: 2019-12-24

## 2019-12-30 ENCOUNTER — APPOINTMENT (OUTPATIENT)
Dept: LAB | Age: 34
End: 2019-12-30
Payer: COMMERCIAL

## 2019-12-30 DIAGNOSIS — I26.99 OTHER PULMONARY EMBOLISM WITHOUT ACUTE COR PULMONALE, UNSPECIFIED CHRONICITY (HCC): ICD-10-CM

## 2019-12-30 LAB
INR PPP: 2.18 (ref 0.84–1.19)
PROTHROMBIN TIME: 23.8 SECONDS (ref 11.6–14.5)

## 2019-12-30 PROCEDURE — 36415 COLL VENOUS BLD VENIPUNCTURE: CPT

## 2019-12-30 PROCEDURE — 85610 PROTHROMBIN TIME: CPT

## 2019-12-31 ENCOUNTER — TREATMENT (OUTPATIENT)
Dept: FAMILY MEDICINE CLINIC | Facility: CLINIC | Age: 34
End: 2019-12-31

## 2019-12-31 ENCOUNTER — ANTICOAG VISIT (OUTPATIENT)
Dept: FAMILY MEDICINE CLINIC | Facility: CLINIC | Age: 34
End: 2019-12-31

## 2019-12-31 ENCOUNTER — TELEPHONE (OUTPATIENT)
Dept: FAMILY MEDICINE CLINIC | Facility: CLINIC | Age: 34
End: 2019-12-31

## 2019-12-31 DIAGNOSIS — I26.99 OTHER ACUTE PULMONARY EMBOLISM WITHOUT ACUTE COR PULMONALE (HCC): ICD-10-CM

## 2019-12-31 DIAGNOSIS — I27.82 CHRONIC PULMONARY EMBOLISM WITHOUT ACUTE COR PULMONALE, UNSPECIFIED PULMONARY EMBOLISM TYPE (HCC): ICD-10-CM

## 2019-12-31 LAB — INR PPP: 2.18 (ref 0.84–1.19)

## 2019-12-31 RX ORDER — WARFARIN SODIUM 10 MG/1
TABLET ORAL
Qty: 30 TABLET | Refills: 12 | Status: SHIPPED | OUTPATIENT
Start: 2019-12-31 | End: 2020-01-29 | Stop reason: DRUGHIGH

## 2019-12-31 NOTE — TELEPHONE ENCOUNTER
Patient informed  Ninaramu Moises wants to know if she can have a script to find out her blood type?

## 2020-01-10 ENCOUNTER — OFFICE VISIT (OUTPATIENT)
Dept: URGENT CARE | Age: 35
End: 2020-01-10
Payer: COMMERCIAL

## 2020-01-10 VITALS
TEMPERATURE: 97 F | SYSTOLIC BLOOD PRESSURE: 127 MMHG | OXYGEN SATURATION: 98 % | RESPIRATION RATE: 18 BRPM | DIASTOLIC BLOOD PRESSURE: 68 MMHG | HEART RATE: 88 BPM

## 2020-01-10 DIAGNOSIS — L30.9 ECZEMA, UNSPECIFIED TYPE: ICD-10-CM

## 2020-01-10 DIAGNOSIS — H10.32 ACUTE CONJUNCTIVITIS OF LEFT EYE, UNSPECIFIED ACUTE CONJUNCTIVITIS TYPE: ICD-10-CM

## 2020-01-10 DIAGNOSIS — J06.9 ACUTE UPPER RESPIRATORY INFECTION: Primary | ICD-10-CM

## 2020-01-10 PROCEDURE — 99213 OFFICE O/P EST LOW 20 MIN: CPT | Performed by: FAMILY MEDICINE

## 2020-01-10 RX ORDER — AMOXICILLIN 500 MG/1
500 CAPSULE ORAL EVERY 8 HOURS SCHEDULED
Qty: 30 CAPSULE | Refills: 0 | Status: SHIPPED | OUTPATIENT
Start: 2020-01-10 | End: 2020-01-20

## 2020-01-10 RX ORDER — OFLOXACIN 3 MG/ML
1 SOLUTION/ DROPS OPHTHALMIC 4 TIMES DAILY
Qty: 5 ML | Refills: 0 | Status: SHIPPED | OUTPATIENT
Start: 2020-01-10 | End: 2020-01-17

## 2020-01-10 NOTE — LETTER
January 10, 2020     Patient: Billie Hendrickson   YOB: 1985   Date of Visit: 1/10/2020       To Whom It May Concern: It is my medical opinion that Madalyn Henriquez may return to work on 01/13/2020  If you have any questions or concerns, please don't hesitate to call           Sincerely,        Nicole Pollock DO    CC: No Recipients

## 2020-01-10 NOTE — PROGRESS NOTES
[facility]        NAME: Ekatreina Dennis is a 29 y o  female  : 1985    MRN: [id number]  DATE: January 10, 2020  TIME: 8:42 AM    Assessment and Plan   Acute upper respiratory infection [J06 9]  1  Acute upper respiratory infection  amoxicillin (AMOXIL) 500 mg capsule   2  Acute conjunctivitis of left eye, unspecified acute conjunctivitis type  ofloxacin (OCUFLOX) 0 3 % ophthalmic solution         Patient Instructions     Patient Instructions   No contacts  1 eye drop in each eye 4 times a day for 7-10 days  Amoxicillin 3 times a day until finished (please take probiotics)  Cold/cough/sinus medication as needed (try Flonase nasal spray 1 spray in each nostril once or twice a day)  Tylenol as needed  Recheck/follow-up with family physician and ophthalmologist as needed  Please go to the hospital emergency department if needed  Follow up with PCP/Ophthalmologist in 3-5 days  Proceed to  ER if symptoms worsen  Chief Complaint     Chief Complaint   Patient presents with    Conjunctivitis     x left eye yesterday, this AM crusty eye     Cold Like Symptoms     x monday , runny nose, cough , sore throat on monday, but subside, post nasl drip  History of Present Illness       Congestion, cough, sore throat, sinus pressure, redness and drainage of the left eye; patient does wear glasses and contacts (contacts last worn yesterday)      Review of Systems   Review of Systems   HENT: Positive for congestion, sinus pressure and sore throat  Eyes: Positive for discharge and redness  Respiratory: Positive for cough  Cardiovascular: Negative  Musculoskeletal: Negative  Skin: Negative  Neurological: Negative            Current Medications       Current Outpatient Medications:     ALPRAZolam (XANAX) 0 25 mg tablet, Take 1 tablet (0 25 mg total) by mouth 2 (two) times a day as needed for anxiety, Disp: 20 tablet, Rfl: 0    amoxicillin (AMOXIL) 500 mg capsule, Take 1 capsule (500 mg total) by mouth every 8 (eight) hours for 30 doses, Disp: 30 capsule, Rfl: 0    Blood Glucose Monitoring Suppl MISC, test one time daily, Disp: , Rfl:     clobetasol (TEMOVATE) 0 05 % ointment, Apply topically 2 (two) times a day For max of 2 weeks at a time  , Disp: 45 g, Rfl: 0    glimepiride (AMARYL) 2 mg tablet, Take 2 mg by mouth every morning before breakfast, Disp: , Rfl:     glucose blood test strip, test one time daily, Disp: , Rfl:     Lancets MISC, test one time daily, Disp: , Rfl:     metFORMIN (GLUCOPHAGE) 1000 MG tablet, TAKE 1 TABLET BY MOUTH EVERY DAY WITH THE MORNING AND EVENING MEAL, Disp: 180 tablet, Rfl: 3    ofloxacin (OCUFLOX) 0 3 % ophthalmic solution, Administer 1 drop to both eyes 4 (four) times a day for 7 days, Disp: 5 mL, Rfl: 0    triamcinolone (KENALOG) 0 1 % ointment, APPLY TO AFFECTED AREA TWICE A DAY, Disp: 30 g, Rfl: 0    warfarin (COUMADIN) 10 mg tablet, Take 1 tablet by oral route every Monday;  Wednesday and Friday, Disp: 30 tablet, Rfl: 12    warfarin (COUMADIN) 4 mg tablet, Take 1 tablet (4 mg total) by mouth daily (Patient not taking: Reported on 11/27/2019), Disp: 90 tablet, Rfl: 3    warfarin (COUMADIN) 7 5 mg tablet, TK 1 T PO QD, Disp: , Rfl:     Current Allergies     Allergies as of 01/10/2020 - Reviewed 01/10/2020   Allergen Reaction Noted    No known allergies  02/20/2018    Other  08/01/2019            The following portions of the patient's history were reviewed and updated as appropriate: allergies, current medications, past family history, past medical history, past social history, past surgical history and problem list      Past Medical History:   Diagnosis Date    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[facility] )     Epistaxis     Factor V Leiden mutation ([facility] )     Herpes zoster     Hypotension     Menorrhagia     Morbid obesity with BMI of 45 0-49 9, adult ([facility] ) 8/5/2019       Past Surgical History:   Procedure Laterality Date    DILATION AND CURETTAGE OF UTERUS      VENA CAVA FILTER PLACEMENT      INTERRUPTION INFERIOR, GREENFIRELD FILTER PLACEMENT       Family History   Problem Relation Age of Onset    Diabetes Father     Cancer Family     Stroke Family     Heart disease Family     Hypercoagulant Ability Family     Factor V Leiden deficiency Family         MUTATION         Medications have been verified  Objective   /68   Pulse 88   Temp (!) 97 °F (36 1 °C)   Resp 18   SpO2 98%        Physical Exam     Physical Exam   Constitutional: She is oriented to person, place, and time  She appears well-developed and well-nourished  HENT:   Right Ear: External ear normal    Left Ear: External ear normal    Nasal congestion; injection and slight erythema of the oropharynx; slight discomfort over the sinuses   Eyes: Pupils are equal, round, and reactive to light  EOM are normal  Left eye exhibits discharge  Erythema and drainage of the left conjunctiva   Neck: Normal range of motion  Neck supple  Cardiovascular: Normal rate, regular rhythm and normal heart sounds  Pulmonary/Chest: Effort normal and breath sounds normal    Neurological: She is alert and oriented to person, place, and time  No nuchal rigidity   Skin:   Good color and turgor   Psychiatric: She has a normal mood and affect  Her behavior is normal    Nursing note and vitals reviewed

## 2020-01-10 NOTE — PATIENT INSTRUCTIONS
No contacts  1 eye drop in each eye 4 times a day for 7-10 days  Amoxicillin 3 times a day until finished (please take probiotics)  Cold/cough/sinus medication as needed (try Flonase nasal spray 1 spray in each nostril once or twice a day)  Tylenol as needed  Recheck/follow-up with family physician and ophthalmologist as needed  Please go to the hospital emergency department if needed

## 2020-01-16 DIAGNOSIS — I82.5Y9 CHRONIC DEEP VEIN THROMBOSIS (DVT) OF PROXIMAL VEIN OF LOWER EXTREMITY, UNSPECIFIED LATERALITY (HCC): ICD-10-CM

## 2020-01-16 DIAGNOSIS — E11.9 TYPE 2 DIABETES MELLITUS WITHOUT COMPLICATION, WITHOUT LONG-TERM CURRENT USE OF INSULIN (HCC): ICD-10-CM

## 2020-01-29 ENCOUNTER — OFFICE VISIT (OUTPATIENT)
Dept: FAMILY MEDICINE CLINIC | Facility: CLINIC | Age: 35
End: 2020-01-29
Payer: COMMERCIAL

## 2020-01-29 VITALS
DIASTOLIC BLOOD PRESSURE: 82 MMHG | HEART RATE: 96 BPM | BODY MASS INDEX: 46.97 KG/M2 | OXYGEN SATURATION: 97 % | SYSTOLIC BLOOD PRESSURE: 128 MMHG | HEIGHT: 65 IN | RESPIRATION RATE: 18 BRPM | TEMPERATURE: 97.5 F | WEIGHT: 281.9 LBS

## 2020-01-29 DIAGNOSIS — I82.4Y9 DEEP VEIN THROMBOSIS (DVT) OF PROXIMAL LOWER EXTREMITY, UNSPECIFIED CHRONICITY, UNSPECIFIED LATERALITY (HCC): ICD-10-CM

## 2020-01-29 DIAGNOSIS — Z00.00 ANNUAL PHYSICAL EXAM: ICD-10-CM

## 2020-01-29 DIAGNOSIS — F41.9 ANXIETY: ICD-10-CM

## 2020-01-29 DIAGNOSIS — E11.9 TYPE 2 DIABETES MELLITUS WITHOUT COMPLICATION, WITHOUT LONG-TERM CURRENT USE OF INSULIN (HCC): ICD-10-CM

## 2020-01-29 DIAGNOSIS — K21.9 GASTROESOPHAGEAL REFLUX DISEASE WITHOUT ESOPHAGITIS: ICD-10-CM

## 2020-01-29 DIAGNOSIS — E66.01 MORBID OBESITY WITH BMI OF 45.0-49.9, ADULT (HCC): ICD-10-CM

## 2020-01-29 DIAGNOSIS — I82.5Y9 CHRONIC DEEP VEIN THROMBOSIS (DVT) OF PROXIMAL VEIN OF LOWER EXTREMITY, UNSPECIFIED LATERALITY (HCC): ICD-10-CM

## 2020-01-29 DIAGNOSIS — E11.9 TYPE 2 DIABETES MELLITUS WITHOUT COMPLICATION, WITHOUT LONG-TERM CURRENT USE OF INSULIN (HCC): Primary | ICD-10-CM

## 2020-01-29 DIAGNOSIS — Z11.4 SCREENING FOR HIV (HUMAN IMMUNODEFICIENCY VIRUS): ICD-10-CM

## 2020-01-29 LAB — SL AMB POCT HEMOGLOBIN AIC: 10.1 (ref ?–6.5)

## 2020-01-29 PROCEDURE — 83036 HEMOGLOBIN GLYCOSYLATED A1C: CPT | Performed by: NURSE PRACTITIONER

## 2020-01-29 PROCEDURE — 36416 COLLJ CAPILLARY BLOOD SPEC: CPT | Performed by: NURSE PRACTITIONER

## 2020-01-29 PROCEDURE — 99395 PREV VISIT EST AGE 18-39: CPT | Performed by: NURSE PRACTITIONER

## 2020-01-29 RX ORDER — WARFARIN SODIUM 10 MG/1
TABLET ORAL
COMMUNITY
End: 2020-01-29 | Stop reason: SDUPTHER

## 2020-01-29 RX ORDER — ALOGLIPTIN 25 MG/1
TABLET, FILM COATED ORAL
Refills: 0 | OUTPATIENT
Start: 2020-01-29

## 2020-01-29 RX ORDER — FAMOTIDINE 40 MG/1
40 TABLET, FILM COATED ORAL
Qty: 90 TABLET | Refills: 3 | Status: SHIPPED | OUTPATIENT
Start: 2020-01-29 | End: 2021-01-12

## 2020-01-29 RX ORDER — HYDROXYZINE HYDROCHLORIDE 25 MG/1
25 TABLET, FILM COATED ORAL EVERY 6 HOURS PRN
Qty: 20 TABLET | Refills: 0 | Status: SHIPPED | OUTPATIENT
Start: 2020-01-29 | End: 2020-04-10

## 2020-01-29 RX ORDER — WARFARIN SODIUM 10 MG/1
10 TABLET ORAL
Qty: 90 TABLET | Refills: 3 | Status: SHIPPED | OUTPATIENT
Start: 2020-01-29 | End: 2021-02-01

## 2020-01-29 RX ORDER — FLASH GLUCOSE SENSOR
KIT MISCELLANEOUS
Qty: 2 EACH | Refills: 5 | Status: SHIPPED | OUTPATIENT
Start: 2020-01-29 | End: 2020-08-10

## 2020-01-29 RX ORDER — FLASH GLUCOSE SCANNING READER
EACH MISCELLANEOUS
Qty: 1 DEVICE | Refills: 1 | Status: SHIPPED | OUTPATIENT
Start: 2020-01-29 | End: 2022-02-11 | Stop reason: SDDI

## 2020-01-29 NOTE — PROGRESS NOTES
BMI Counseling: Body mass index is 46 91 kg/m²  The BMI is above normal  Nutrition recommendations include reducing portion sizes, decreasing overall calorie intake, 3-5 servings of fruits/vegetables daily, reducing fast food intake, consuming healthier snacks, decreasing soda and/or juice intake, moderation in carbohydrate intake, increasing intake of lean protein, reducing intake of saturated fat and trans fat and reducing intake of cholesterol  Exercise recommendations include moderate aerobic physical activity for 150 minutes/week, exercising 3-5 times per week and joining a gym  ADULT ANNUAL PHYSICAL  [facility]    NAME: Betito Garcia  AGE: 29 y o  SEX: female  : 1985     DATE: 2020     Assessment and Plan:     Problem List Items Addressed This Visit        Endocrine    Type 2 diabetes mellitus ([facility] )    Relevant Medications    metFORMIN (GLUCOPHAGE) 1000 MG tablet    sitaGLIPtin (JANUVIA) 25 mg tablet    Continuous Blood Gluc Sensor (FREESTYLE JOHN 14 DAY SENSOR) MISC    Continuous Blood Gluc  (FREESTYLE JOHN 14 DAY READER) GRISEL  Will change up treatment and try to get [facility] [place]  Other Relevant Orders    POCT hemoglobin A1c (Completed)    Ambulatory referral to Ophthalmology       Cardiovascular and Mediastinum    Deep vein thrombosis (DVT) (HCC)    Relevant Medications    warfarin (COUMADIN) 10 mg tablet    metFORMIN (GLUCOPHAGE) 1000 MG tablet       Other    Morbid obesity with BMI of 45 0-49 9, adult ([facility] )      Other Visit Diagnoses     Screening for HIV (human immunodeficiency virus)    -  Primary    Relevant Orders    HIV 1/2 AG-AB combo    Annual physical exam        Gastroesophageal reflux disease without esophagitis        Relevant Medications    famotidine (PEPCID) 40 MG tablet      Anxiety     Will try hydroxyzine   Prn  Immunizations and preventive care screenings were discussed with patient today  Appropriate education was printed on patient's after visit summary  Counseling:  Alcohol/drug use: discussed moderation in alcohol intake, the recommendations for healthy alcohol use, and avoidance of illicit drug use  Dental Health: discussed importance of regular tooth brushing, flossing, and dental visits  · Exercise: the importance of regular exercise/physical activity was discussed  Recommend exercise 3-5 times per week for at least 30 minutes  BMI Counseling: Body mass index is 46 91 kg/m²  The BMI is above normal  Nutrition recommendations include decreasing portion sizes, encouraging healthy choices of fruits and vegetables, decreasing fast food intake, consuming healthier snacks and moderation in carbohydrate intake  Exercise recommendations include exercising 3-5 times per week  No pharmacotherapy was ordered  Return in about 4 weeks (around 2/26/2020) for Next scheduled follow up  Chief Complaint:     Chief Complaint   Patient presents with    Annual Exam      History of Present Illness:     Adult Annual Physical   Patient here for a comprehensive physical exam  The patient reports problems - DM out of control  NOt doing great follow through  Havingright calf pain   On the lateral aspect  Feeling like hurt muscle    going on for 2 weeks  not constant pain    coomes with certain movement  Anxiety is getting worse at times    Uses xanax PRN and occasionally will have a panic attack  Having GERD   Waking up with the acid     Diet and Physical Activity  · Diet/Nutrition: trying to eat healthy and has issues with getting meals and living with other people    · Exercise: no formal exercise  Depression Screening  PHQ-9 Depression Screening    PHQ-9:    Frequency of the following problems over the past two weeks:            General Health  · Sleep: sleeps well and gets 7-8 hours of sleep on average  · Hearing: normal - bilateral   · Vision: no vision problems     · Dental: regular dental visits  /GYN Health  · Last menstrual period: has IUD  · Contraceptive method: IUD placement  · History of STDs?: no      Review of Systems:     Review of Systems   Constitutional: Negative for activity change, appetite change, fatigue, fever and unexpected weight change  HENT: Negative for congestion, dental problem, postnasal drip and sneezing  Eyes: Negative for discharge and visual disturbance  Respiratory: Negative for cough, chest tightness and wheezing  Cardiovascular: Negative for chest pain  Gastrointestinal: Negative for abdominal pain, constipation, diarrhea, nausea and vomiting  Endocrine: Negative for polydipsia and polyuria  Genitourinary: Negative for dysuria, frequency and urgency  Musculoskeletal: Negative for arthralgias  Having calf pain that comes and goes    Skin: Negative for rash  Allergic/Immunologic: Negative for environmental allergies and food allergies  Neurological: Negative for dizziness, light-headedness, numbness and headaches  Hematological: Negative for adenopathy  Psychiatric/Behavioral: Negative for behavioral problems and sleep disturbance        Past Medical History:     Past Medical History:   Diagnosis Date    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[place] [address] )     Epistaxis     Factor V Leiden mutation ([place] [address] )     Herpes zoster     Hypotension     Menorrhagia     Morbid obesity with BMI of 45 0-49 9, adult ([place] [address] ) 8/5/2019      Past Surgical History:     Past Surgical History:   Procedure Laterality Date    DILATION AND CURETTAGE OF UTERUS      VENA CAVA FILTER PLACEMENT      INTERRUPTION INFERIOR, GREENFIRELD FILTER PLACEMENT      Social History:     Social History     Socioeconomic History    Marital status: Single     Spouse name: None    Number of children: None    Years of education: None    Highest education level: None   Occupational History    Occupation:    Social Needs    Financial resource strain: None    Food insecurity:     Worry: None     Inability: None    Transportation needs:     Medical: None     Non-medical: None   Tobacco Use    Smoking status: Former Smoker     Last attempt to quit: 2018     Years since quittin 0    Smokeless tobacco: Former User     Quit date: 2018    Tobacco comment: TOBACCO USE   Substance and Sexual Activity    Alcohol use: Yes     Frequency: 2-4 times a month     Comment: rarely    Drug use: No    Sexual activity: None   Lifestyle    Physical activity:     Days per week: None     Minutes per session: None    Stress: None   Relationships    Social connections:     Talks on phone: None     Gets together: None     Attends Yazidi service: None     Active member of club or organization: None     Attends meetings of clubs or organizations: None     Relationship status: None    Intimate partner violence:     Fear of current or ex partner: None     Emotionally abused: None     Physically abused: None     Forced sexual activity: None   Other Topics Concern    None   Social History Narrative    None      Family History:     Family History   Problem Relation Age of Onset    Diabetes Father     Cancer Family     Stroke Family     Heart disease Family     Hypercoagulant Ability Family     Factor V Leiden deficiency Family         MUTATION      Current Medications:     Current Outpatient Medications   Medication Sig Dispense Refill    ALPRAZolam (XANAX) 0 25 mg tablet Take 1 tablet (0 25 mg total) by mouth 2 (two) times a day as needed for anxiety 20 tablet 0    Blood Glucose Monitoring Suppl MISC test one time daily      clobetasol (TEMOVATE) 0 05 % ointment Apply topically 2 (two) times a day For max of 2 weeks at a time   45 g 0    glucose blood test strip test one time daily      Lancets MISC test one time daily      metFORMIN (GLUCOPHAGE) 1000 MG tablet Take 1 tablet (1,000 mg total) by mouth daily with breakfast      triamcinolone (KENALOG) 0 1 % ointment APPLY TO AFFECTED AREA TWICE A DAY 30 g 0    warfarin (COUMADIN) 10 mg tablet Take 1 tablet (10 mg total) by mouth daily 90 tablet 3    Continuous Blood Gluc  (FREESTYLE JOHN 14 DAY READER) GRISEL Reading daily 1 Device 1    Continuous Blood Gluc Sensor (FREESTYLE JOHN 14 DAY SENSOR) MISC Test daily 2 each 5    famotidine (PEPCID) 40 MG tablet Take 1 tablet (40 mg total) by mouth daily at bedtime 90 tablet 3    hydrOXYzine HCL (ATARAX) 25 mg tablet Take 1 tablet (25 mg total) by mouth every 6 (six) hours as needed for anxiety 20 tablet 0    sitaGLIPtin (JANUVIA) 25 mg tablet Take 1 tablet (25 mg total) by mouth daily 30 tablet 5     No current facility-administered medications for this visit  Allergies: Allergies   Allergen Reactions    No Known Allergies     Other      seasonal      Physical Exam:     /82 (BP Location: Left arm, Patient Position: Sitting, Cuff Size: Large)   Pulse 96   Temp 97 5 °F (36 4 °C) (Temporal)   Resp 18   Ht 5' 5" (1 651 m)   Wt 128 kg (281 lb 14 4 oz)   SpO2 97%   BMI 46 91 kg/m²     Physical Exam   Constitutional: She is oriented to person, place, and time  She appears well-developed and well-nourished  HENT:   Head: Normocephalic  Right Ear: External ear normal    Left Ear: External ear normal    Nose: Nose normal    Eyes: Pupils are equal, round, and reactive to light  Conjunctivae are normal  Right eye exhibits no discharge  Left eye exhibits no discharge  Neck: Normal range of motion  Neck supple  No thyromegaly present  Cardiovascular: Normal rate, regular rhythm and normal heart sounds  Pulses are no weak pulses  No murmur heard  Pulses:       Dorsalis pedis pulses are 2+ on the right side, and 2+ on the left side  Pulmonary/Chest: Effort normal and breath sounds normal    Abdominal: Soft  Bowel sounds are normal  There is tenderness in the epigastric area  Musculoskeletal: Normal range of motion          Right lower leg: She exhibits tenderness  She exhibits no swelling and no edema  Feet:   Right Foot:   Skin Integrity: Negative for ulcer, skin breakdown, erythema, warmth, callus or dry skin  Left Foot:   Skin Integrity: Negative for ulcer, skin breakdown, erythema, warmth, callus or dry skin  Lymphadenopathy:     She has no cervical adenopathy  Neurological: She is alert and oriented to person, place, and time  Skin: Skin is warm  No rash noted  Psychiatric: She has a normal mood and affect  Her behavior is normal    Vitals reviewed  Patient's shoes and socks removed  Right Foot/Ankle   Right Foot Inspection  Skin Exam: skin normal and skin intact no dry skin, no warmth, no callus, no erythema, no maceration, no abnormal color, no pre-ulcer, no ulcer and no callus                          Toe Exam: ROM and strength within normal limits  Sensory       Monofilament testing: intact  Vascular  Capillary refills: < 3 seconds  The right DP pulse is 2+  Left Foot/Ankle  Left Foot Inspection  Skin Exam: skin normal and skin intactno dry skin, no warmth, no erythema, no maceration, normal color, no pre-ulcer, no ulcer and no callus                         Toe Exam: ROM and strength within normal limits                   Sensory       Monofilament: intact  Vascular  Capillary refills: < 3 seconds  The left DP pulse is 2+  Assign Risk Category:  No deformity present; No loss of protective sensation;  No weak pulses       Risk: 0      Claudean Ingle, CRNP   [address]

## 2020-01-29 NOTE — PATIENT INSTRUCTIONS

## 2020-02-05 DIAGNOSIS — E11.9 TYPE 2 DIABETES MELLITUS WITHOUT COMPLICATION, WITHOUT LONG-TERM CURRENT USE OF INSULIN (HCC): ICD-10-CM

## 2020-02-10 ENCOUNTER — DOCUMENTATION (OUTPATIENT)
Dept: FAMILY MEDICINE CLINIC | Facility: CLINIC | Age: 35
End: 2020-02-10

## 2020-02-13 ENCOUNTER — TELEPHONE (OUTPATIENT)
Dept: FAMILY MEDICINE CLINIC | Facility: CLINIC | Age: 35
End: 2020-02-13

## 2020-02-13 NOTE — TELEPHONE ENCOUNTER
Patient is calling to let sulaiman know that she was able to get her [facility] prescription through [facility] and it is much more affordable than [facility]  She is going to drop off a form next week for Sulaiman to fill out so they can send it to her home  Patient just wanted to make [facility] aware

## 2020-02-25 ENCOUNTER — TELEPHONE (OUTPATIENT)
Dept: FAMILY MEDICINE CLINIC | Facility: CLINIC | Age: 35
End: 2020-02-25

## 2020-02-25 NOTE — TELEPHONE ENCOUNTER
Patient dropped off paper work, it is in [employer] bin  When finished we can't fax it we need to mail it to the address on the front sheet

## 2020-02-25 NOTE — TELEPHONE ENCOUNTER
I scheduled patient to come in sooner and she will drop off paper work for us to fill and send out thank you

## 2020-02-26 ENCOUNTER — TELEPHONE (OUTPATIENT)
Dept: FAMILY MEDICINE CLINIC | Facility: CLINIC | Age: 35
End: 2020-02-26

## 2020-03-16 ENCOUNTER — TELEPHONE (OUTPATIENT)
Dept: FAMILY MEDICINE CLINIC | Facility: CLINIC | Age: 35
End: 2020-03-16

## 2020-03-16 DIAGNOSIS — E11.9 TYPE 2 DIABETES MELLITUS WITHOUT COMPLICATION, WITHOUT LONG-TERM CURRENT USE OF INSULIN (HCC): Primary | ICD-10-CM

## 2020-03-27 ENCOUNTER — TELEPHONE (OUTPATIENT)
Dept: FAMILY MEDICINE CLINIC | Facility: CLINIC | Age: 35
End: 2020-03-27

## 2020-04-08 ENCOUNTER — TELEMEDICINE (OUTPATIENT)
Dept: FAMILY MEDICINE CLINIC | Facility: CLINIC | Age: 35
End: 2020-04-08
Payer: COMMERCIAL

## 2020-04-08 DIAGNOSIS — E11.9 TYPE 2 DIABETES MELLITUS WITHOUT COMPLICATION, WITHOUT LONG-TERM CURRENT USE OF INSULIN (HCC): Primary | ICD-10-CM

## 2020-04-08 DIAGNOSIS — F41.1 ANXIETY STATE: ICD-10-CM

## 2020-04-08 PROCEDURE — 99213 OFFICE O/P EST LOW 20 MIN: CPT | Performed by: NURSE PRACTITIONER

## 2020-04-10 DIAGNOSIS — F41.9 ANXIETY: ICD-10-CM

## 2020-04-10 RX ORDER — HYDROXYZINE HYDROCHLORIDE 25 MG/1
25 TABLET, FILM COATED ORAL EVERY 6 HOURS PRN
Qty: 20 TABLET | Refills: 0 | Status: SHIPPED | OUTPATIENT
Start: 2020-04-10

## 2020-04-14 ENCOUNTER — TELEPHONE (OUTPATIENT)
Dept: FAMILY MEDICINE CLINIC | Facility: CLINIC | Age: 35
End: 2020-04-14

## 2020-04-14 DIAGNOSIS — J30.2 SEASONAL ALLERGIES: Primary | ICD-10-CM

## 2020-04-14 RX ORDER — CETIRIZINE HYDROCHLORIDE 10 MG/1
10 TABLET ORAL DAILY
Qty: 30 TABLET | Refills: 2 | Status: SHIPPED | OUTPATIENT
Start: 2020-04-14 | End: 2021-08-10 | Stop reason: ALTCHOICE

## 2020-04-22 ENCOUNTER — TELEPHONE (OUTPATIENT)
Dept: FAMILY MEDICINE CLINIC | Facility: CLINIC | Age: 35
End: 2020-04-22

## 2020-04-22 DIAGNOSIS — E11.9 TYPE 2 DIABETES MELLITUS WITHOUT COMPLICATION, WITHOUT LONG-TERM CURRENT USE OF INSULIN (HCC): ICD-10-CM

## 2020-04-29 ENCOUNTER — DOCUMENTATION (OUTPATIENT)
Dept: FAMILY MEDICINE CLINIC | Facility: CLINIC | Age: 35
End: 2020-04-29

## 2020-05-15 ENCOUNTER — TELEPHONE (OUTPATIENT)
Dept: FAMILY MEDICINE CLINIC | Facility: CLINIC | Age: 35
End: 2020-05-15

## 2020-06-08 ENCOUNTER — TELEPHONE (OUTPATIENT)
Dept: FAMILY MEDICINE CLINIC | Facility: CLINIC | Age: 35
End: 2020-06-08

## 2020-06-08 DIAGNOSIS — F41.9 ANXIETY: Primary | ICD-10-CM

## 2020-06-09 DIAGNOSIS — E11.9 TYPE 2 DIABETES MELLITUS WITHOUT COMPLICATION, WITHOUT LONG-TERM CURRENT USE OF INSULIN (HCC): ICD-10-CM

## 2020-06-09 RX ORDER — GLIMEPIRIDE 2 MG/1
TABLET ORAL
COMMUNITY
Start: 2020-03-21 | End: 2020-06-17

## 2020-06-09 RX ORDER — FLUOXETINE HYDROCHLORIDE 20 MG/1
20 CAPSULE ORAL DAILY
Qty: 30 CAPSULE | Refills: 1 | Status: SHIPPED | OUTPATIENT
Start: 2020-06-09 | End: 2020-07-02

## 2020-06-17 DIAGNOSIS — E11.9 TYPE 2 DIABETES MELLITUS WITHOUT COMPLICATION, WITHOUT LONG-TERM CURRENT USE OF INSULIN (HCC): Primary | ICD-10-CM

## 2020-06-17 RX ORDER — GLIMEPIRIDE 2 MG/1
TABLET ORAL
Qty: 90 TABLET | Refills: 3 | Status: SHIPPED | OUTPATIENT
Start: 2020-06-17 | End: 2020-07-16 | Stop reason: ALTCHOICE

## 2020-07-02 DIAGNOSIS — F41.9 ANXIETY: ICD-10-CM

## 2020-07-02 RX ORDER — FLUOXETINE HYDROCHLORIDE 20 MG/1
CAPSULE ORAL
Qty: 30 CAPSULE | Refills: 0 | Status: SHIPPED | OUTPATIENT
Start: 2020-07-02 | End: 2020-07-27

## 2020-07-16 ENCOUNTER — TELEMEDICINE (OUTPATIENT)
Dept: FAMILY MEDICINE CLINIC | Facility: CLINIC | Age: 35
End: 2020-07-16
Payer: COMMERCIAL

## 2020-07-16 VITALS — TEMPERATURE: 97.8 F

## 2020-07-16 DIAGNOSIS — I82.5Y9 CHRONIC DEEP VEIN THROMBOSIS (DVT) OF PROXIMAL VEIN OF LOWER EXTREMITY, UNSPECIFIED LATERALITY (HCC): ICD-10-CM

## 2020-07-16 DIAGNOSIS — E11.9 TYPE 2 DIABETES MELLITUS WITHOUT COMPLICATION, WITHOUT LONG-TERM CURRENT USE OF INSULIN (HCC): ICD-10-CM

## 2020-07-16 DIAGNOSIS — F41.1 ANXIETY STATE: Primary | ICD-10-CM

## 2020-07-16 PROCEDURE — 99214 OFFICE O/P EST MOD 30 MIN: CPT | Performed by: NURSE PRACTITIONER

## 2020-07-16 PROCEDURE — 1036F TOBACCO NON-USER: CPT | Performed by: NURSE PRACTITIONER

## 2020-07-16 PROCEDURE — 3046F HEMOGLOBIN A1C LEVEL >9.0%: CPT | Performed by: NURSE PRACTITIONER

## 2020-07-16 NOTE — PROGRESS NOTES
Virtual Regular Visit      Assessment/Plan:  Was to come to office today but  but getting tested for covid  Problem List Items Addressed This Visit        Endocrine    Type 2 diabetes mellitus ([address] )    Relevant Medications    metFORMIN (GLUCOPHAGE) 1000 MG tablet  Cont with [facility] also    Test numbers   Come to office in 3-4 weeks and see me and will get A1C           Cardiovascular and Mediastinum    Deep vein thrombosis (DVT) (HCC)    Relevant Medications    metFORMIN (GLUCOPHAGE) 1000 MG tablet    Other Relevant Orders    Protime-INR       Other    Anxiety state - Primary      anxiety is stable  Feeling better this week   Will not change meds  Reason for visit is   Chief Complaint   Patient presents with    Virtual Brief Visit    Virtual Regular Visit        Encounter provider DYLAN Worthington    Provider located at [facility] AT [address] [facility]  [address] [place], [place] [address]      Recent Visits  No visits were found meeting these conditions  Showing recent visits within past 7 days and meeting all other requirements     Today's Visits  Date Type Provider Dept   07/16/20 [address] [place] [address], [address] [place] [address]   Showing today's visits and meeting all other requirements     Future Appointments  No visits were found meeting these conditions  Showing future appointments within next 150 days and meeting all other requirements        The patient was identified by name and date of birth  Felipe Hansen was informed that this is a telemedicine visit and that the visit is being conducted through [facility] and patient was informed that this is a secure, HIPAA-compliant platform  She agrees to proceed     My office door was closed  No one else was in the room  She acknowledged consent and understanding of privacy and security of the video platform   The patient has agreed to participate and understands they can discontinue the visit at any time  Patient is aware this is a billable service  Saud Lane is a 28 y o  female DM check and recent numbers range [id number]   Has lost wt stopped the metformin        Anxiety was bad and then last week started to improve and much better   Cont on prozac 20 mg      Occasionally getting lightheaded with quick movement     HPI   See above   Past Medical History:   Diagnosis Date    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HCC)     Epistaxis     Factor V Leiden mutation ([facility] )     Herpes zoster     Hypotension     Menorrhagia     Morbid obesity with BMI of 45 0-49 9, adult ([facility] ) 8/5/2019       Past Surgical History:   Procedure Laterality Date    DILATION AND CURETTAGE OF UTERUS      VENA CAVA FILTER PLACEMENT      INTERRUPTION INFERIOR, GREENFIRELD FILTER PLACEMENT       Current Outpatient Medications   Medication Sig Dispense Refill    Blood Glucose Monitoring Suppl MISC test one time daily      cetirizine (ZyrTEC) 10 mg tablet Take 1 tablet (10 mg total) by mouth daily 30 tablet 2    clobetasol (TEMOVATE) 0 05 % ointment Apply topically 2 (two) times a day For max of 2 weeks at a time   45 g 0    Continuous Blood Gluc  (FREESTYLE JOHN 14 DAY READER) GRISEL Reading daily 1 Device 1    Continuous Blood Gluc Sensor (FREESTYLE JOHN 14 DAY SENSOR) MISC Test daily 2 each 5    famotidine (PEPCID) 40 MG tablet Take 1 tablet (40 mg total) by mouth daily at bedtime 90 tablet 3    FLUoxetine (PROzac) 20 mg capsule TAKE 1 CAPSULE BY MOUTH EVERY DAY 30 capsule 0    glucose blood test strip test one time daily      hydrOXYzine HCL (ATARAX) 25 mg tablet TAKE 1 TABLET (25 MG TOTAL) BY MOUTH EVERY 6 (SIX) HOURS AS NEEDED FOR ANXIETY 20 tablet 0    Lancets MISC test one time daily      sitaGLIPtin (JANUVIA) 50 mg tablet Take 1 tablet (50 mg total) by mouth daily 90 tablet 3    triamcinolone (KENALOG) 0 1 % ointment APPLY TO AFFECTED AREA TWICE A DAY 30 g 0    warfarin (COUMADIN) 10 mg tablet Take 1 tablet (10 mg total) by mouth daily 90 tablet 3    metFORMIN (GLUCOPHAGE) 1000 MG tablet Take 1 tablet (1,000 mg total) by mouth daily with breakfast       No current facility-administered medications for this visit  Allergies   Allergen Reactions    No Known Allergies     Other      seasonal       Review of Systems   Constitutional: Negative for activity change, appetite change, chills, fatigue and fever  HENT: Positive for postnasal drip  Negative for congestion, ear pain and sore throat  Respiratory: Negative for cough  Cardiovascular: Negative for chest pain  Gastrointestinal: Negative for abdominal pain, constipation, diarrhea and nausea  Genitourinary: Negative for frequency and urgency  Musculoskeletal: Negative for back pain  Skin: Negative for rash  Neurological: Negative for dizziness, light-headedness, numbness and headaches  Psychiatric/Behavioral: The patient is not nervous/anxious  Video Exam    Vitals:    07/16/20 1531   Temp: 97 8 °F (36 6 °C)   TempSrc: Temporal       Physical Exam   Constitutional: She is oriented to person, place, and time  She appears well-developed and well-nourished  Pulmonary/Chest: Effort normal    Neurological: She is alert and oriented to person, place, and time  I spent 15 minutes directly with the patient during this visit      VIRTUAL VISIT [address] acknowledges that she has consented to an online visit or consultation  She understands that the online visit is based solely on information provided by her, and that, in the absence of a face-to-face physical evaluation by the physician, the diagnosis she receives is both limited and provisional in terms of accuracy and completeness  This is not intended to replace a full medical face-to-face evaluation by the physician  Angelica Lai understands and accepts these terms

## 2020-07-17 ENCOUNTER — TELEPHONE (OUTPATIENT)
Dept: FAMILY MEDICINE CLINIC | Facility: CLINIC | Age: 35
End: 2020-07-17

## 2020-07-17 NOTE — LETTER
7/17/2020     Angelica Mahan    1985    Angelica is is isolating at this time due to possible covid exposure  The test is not back and once this is received and if negative she may return       Thank you for your understanding in this matter          Sincerely ,       Swati Murphy

## 2020-07-17 NOTE — TELEPHONE ENCOUNTER
Patient is calling requesting for a letter for work  possible expose to covid 19   Due to her        Fax [phone]

## 2020-07-25 DIAGNOSIS — F41.9 ANXIETY: ICD-10-CM

## 2020-07-27 ENCOUNTER — APPOINTMENT (OUTPATIENT)
Dept: LAB | Age: 35
End: 2020-07-27
Payer: COMMERCIAL

## 2020-07-27 DIAGNOSIS — I82.5Y9 CHRONIC DEEP VEIN THROMBOSIS (DVT) OF PROXIMAL VEIN OF LOWER EXTREMITY, UNSPECIFIED LATERALITY (HCC): ICD-10-CM

## 2020-07-27 LAB
INR PPP: 3.47 (ref 0.84–1.19)
INR PPP: 3.47 (ref 0.84–1.19)
PROTHROMBIN TIME: 34.6 SECONDS (ref 11.6–14.5)

## 2020-07-27 PROCEDURE — 85610 PROTHROMBIN TIME: CPT

## 2020-07-27 PROCEDURE — 36415 COLL VENOUS BLD VENIPUNCTURE: CPT

## 2020-07-27 RX ORDER — FLUOXETINE HYDROCHLORIDE 20 MG/1
CAPSULE ORAL
Qty: 30 CAPSULE | Refills: 0 | Status: SHIPPED | OUTPATIENT
Start: 2020-07-27 | End: 2020-08-24

## 2020-07-28 ENCOUNTER — TREATMENT (OUTPATIENT)
Dept: FAMILY MEDICINE CLINIC | Facility: CLINIC | Age: 35
End: 2020-07-28

## 2020-07-28 ENCOUNTER — TELEPHONE (OUTPATIENT)
Dept: FAMILY MEDICINE CLINIC | Facility: CLINIC | Age: 35
End: 2020-07-28

## 2020-07-28 ENCOUNTER — ANTICOAG VISIT (OUTPATIENT)
Dept: FAMILY MEDICINE CLINIC | Facility: CLINIC | Age: 35
End: 2020-07-28

## 2020-07-28 DIAGNOSIS — I27.82 CHRONIC PULMONARY EMBOLISM WITHOUT ACUTE COR PULMONALE, UNSPECIFIED PULMONARY EMBOLISM TYPE (HCC): ICD-10-CM

## 2020-07-28 DIAGNOSIS — I82.4Y9 DEEP VEIN THROMBOSIS (DVT) OF PROXIMAL LOWER EXTREMITY, UNSPECIFIED CHRONICITY, UNSPECIFIED LATERALITY (HCC): Primary | ICD-10-CM

## 2020-07-28 DIAGNOSIS — I82.4Y9 DEEP VEIN THROMBOSIS (DVT) OF PROXIMAL LOWER EXTREMITY, UNSPECIFIED CHRONICITY, UNSPECIFIED LATERALITY (HCC): ICD-10-CM

## 2020-07-28 NOTE — TELEPHONE ENCOUNTER
Corazon states that any md in the office handles this patients coumadin  Has a template started already

## 2020-07-29 ENCOUNTER — TELEPHONE (OUTPATIENT)
Dept: OBGYN CLINIC | Facility: CLINIC | Age: 35
End: 2020-07-29

## 2020-07-29 DIAGNOSIS — L29.2 VULVAR ITCHING: ICD-10-CM

## 2020-07-29 RX ORDER — CLOBETASOL PROPIONATE 0.5 MG/G
OINTMENT TOPICAL 2 TIMES DAILY
Qty: 45 G | Refills: 0 | Status: SHIPPED | OUTPATIENT
Start: 2020-07-29 | End: 2021-06-17

## 2020-08-09 DIAGNOSIS — E11.9 TYPE 2 DIABETES MELLITUS WITHOUT COMPLICATION, WITHOUT LONG-TERM CURRENT USE OF INSULIN (HCC): ICD-10-CM

## 2020-08-10 RX ORDER — FLASH GLUCOSE SENSOR
KIT MISCELLANEOUS
Qty: 100 EACH | Refills: 5 | Status: SHIPPED | OUTPATIENT
Start: 2020-08-10 | End: 2021-09-07

## 2020-08-22 DIAGNOSIS — F41.9 ANXIETY: ICD-10-CM

## 2020-08-24 RX ORDER — FLUOXETINE HYDROCHLORIDE 20 MG/1
CAPSULE ORAL
Qty: 30 CAPSULE | Refills: 0 | Status: SHIPPED | OUTPATIENT
Start: 2020-08-24 | End: 2020-09-21

## 2020-08-26 ENCOUNTER — OFFICE VISIT (OUTPATIENT)
Dept: FAMILY MEDICINE CLINIC | Facility: CLINIC | Age: 35
End: 2020-08-26
Payer: COMMERCIAL

## 2020-08-26 VITALS
HEIGHT: 65 IN | WEIGHT: 256.7 LBS | TEMPERATURE: 97.5 F | RESPIRATION RATE: 18 BRPM | BODY MASS INDEX: 42.77 KG/M2 | HEART RATE: 80 BPM | DIASTOLIC BLOOD PRESSURE: 80 MMHG | SYSTOLIC BLOOD PRESSURE: 128 MMHG

## 2020-08-26 DIAGNOSIS — F41.1 ANXIETY STATE: ICD-10-CM

## 2020-08-26 DIAGNOSIS — E11.9 TYPE 2 DIABETES MELLITUS WITHOUT COMPLICATION, WITHOUT LONG-TERM CURRENT USE OF INSULIN (HCC): Primary | ICD-10-CM

## 2020-08-26 LAB
CREAT UR-MCNC: 136 MG/DL
MICROALBUMIN UR-MCNC: 28 MG/L (ref 0–20)
MICROALBUMIN/CREAT 24H UR: 21 MG/G CREATININE (ref 0–30)
SL AMB POCT HEMOGLOBIN AIC: 9 (ref ?–6.5)

## 2020-08-26 PROCEDURE — 83036 HEMOGLOBIN GLYCOSYLATED A1C: CPT | Performed by: NURSE PRACTITIONER

## 2020-08-26 PROCEDURE — 99214 OFFICE O/P EST MOD 30 MIN: CPT | Performed by: NURSE PRACTITIONER

## 2020-08-26 PROCEDURE — 82570 ASSAY OF URINE CREATININE: CPT | Performed by: NURSE PRACTITIONER

## 2020-08-26 PROCEDURE — 36416 COLLJ CAPILLARY BLOOD SPEC: CPT | Performed by: NURSE PRACTITIONER

## 2020-08-26 PROCEDURE — 3061F NEG MICROALBUMINURIA REV: CPT | Performed by: NURSE PRACTITIONER

## 2020-08-26 PROCEDURE — 3725F SCREEN DEPRESSION PERFORMED: CPT | Performed by: NURSE PRACTITIONER

## 2020-08-26 PROCEDURE — 82043 UR ALBUMIN QUANTITATIVE: CPT | Performed by: NURSE PRACTITIONER

## 2020-08-26 PROCEDURE — 3052F HG A1C>EQUAL 8.0%<EQUAL 9.0%: CPT | Performed by: NURSE PRACTITIONER

## 2020-08-26 PROCEDURE — 3008F BODY MASS INDEX DOCD: CPT | Performed by: NURSE PRACTITIONER

## 2020-08-26 PROCEDURE — 1036F TOBACCO NON-USER: CPT | Performed by: NURSE PRACTITIONER

## 2020-08-26 NOTE — PROGRESS NOTES
Assessment/Plan:         Diagnoses and all orders for this visit:    Type 2 diabetes mellitus without complication, without long-term current use of insulin (HCC)  -     Microalbumin / creatinine urine ratio  -     POCT hemoglobin A1c    Anxiety state      Stable on current dose   Will maintain    Subjective:      Patient ID: Iris Dowell is a 28 y o  female  HPI  Here for follow up on the DM    The lubna kept falling off    Sugars states ok others not  Has lost wt  Anxiety is much better Taking fluoxitine and feels it is happening  The following portions of the patient's history were reviewed and updated as appropriate: allergies, current medications, past family history, past medical history, past social history, past surgical history and problem list     Review of Systems   Constitutional: Negative for activity change, appetite change, fatigue, fever and unexpected weight change  HENT: Negative for congestion, dental problem, nosebleeds, rhinorrhea and sneezing  Eyes: Negative for discharge and visual disturbance  Respiratory: Negative for cough and wheezing  Cardiovascular: Negative for chest pain  Gastrointestinal: Negative for abdominal pain, constipation, diarrhea, nausea and vomiting  Endocrine: Negative for polydipsia and polyuria  Genitourinary: Negative for dysuria, frequency and urgency  Musculoskeletal: Negative for arthralgias  Skin: Negative for rash  Allergic/Immunologic: Negative for environmental allergies and food allergies  Neurological: Negative for dizziness, light-headedness and headaches  Hematological: Negative for adenopathy  Psychiatric/Behavioral: Negative for behavioral problems and sleep disturbance  The patient is nervous/anxious            Objective:  Vitals:    08/26/20 1129   BP: 128/80   BP Location: Left arm   Patient Position: Sitting   Cuff Size: Large   Pulse: 80   Resp: 18   Temp: 97 5 °F (36 4 °C)   TempSrc: Temporal   Weight: 116 kg (256 lb 11 2 oz)   Height: 5' 5" (1 651 m)       Physical Exam  Vitals signs reviewed  Constitutional:       Appearance: She is well-developed  HENT:      Head: Normocephalic  Right Ear: External ear normal       Left Ear: External ear normal       Nose: Nose normal    Eyes:      General:         Right eye: No discharge  Left eye: No discharge  Conjunctiva/sclera: Conjunctivae normal       Pupils: Pupils are equal, round, and reactive to light  Neck:      Musculoskeletal: Normal range of motion and neck supple  Thyroid: No thyromegaly  Cardiovascular:      Rate and Rhythm: Normal rate and regular rhythm  Heart sounds: Normal heart sounds  No murmur  Pulmonary:      Effort: Pulmonary effort is normal       Breath sounds: Normal breath sounds  Abdominal:      General: Bowel sounds are normal       Palpations: Abdomen is soft  Tenderness: There is no abdominal tenderness  Musculoskeletal: Normal range of motion  Lymphadenopathy:      Cervical: No cervical adenopathy  Skin:     General: Skin is warm  Findings: No rash  Neurological:      Mental Status: She is alert and oriented to person, place, and time     Psychiatric:         Behavior: Behavior normal

## 2020-09-21 DIAGNOSIS — F41.9 ANXIETY: ICD-10-CM

## 2020-09-21 RX ORDER — FLUOXETINE HYDROCHLORIDE 20 MG/1
CAPSULE ORAL
Qty: 30 CAPSULE | Refills: 0 | Status: SHIPPED | OUTPATIENT
Start: 2020-09-21 | End: 2020-10-19

## 2020-10-17 DIAGNOSIS — F41.9 ANXIETY: ICD-10-CM

## 2020-10-19 RX ORDER — FLUOXETINE HYDROCHLORIDE 20 MG/1
CAPSULE ORAL
Qty: 30 CAPSULE | Refills: 0 | Status: SHIPPED | OUTPATIENT
Start: 2020-10-19 | End: 2020-11-13

## 2020-11-13 DIAGNOSIS — F41.9 ANXIETY: ICD-10-CM

## 2020-11-13 RX ORDER — FLUOXETINE HYDROCHLORIDE 20 MG/1
CAPSULE ORAL
Qty: 30 CAPSULE | Refills: 0 | Status: SHIPPED | OUTPATIENT
Start: 2020-11-13 | End: 2020-12-14

## 2020-11-19 ENCOUNTER — OFFICE VISIT (OUTPATIENT)
Dept: FAMILY MEDICINE CLINIC | Facility: CLINIC | Age: 35
End: 2020-11-19
Payer: COMMERCIAL

## 2020-11-19 VITALS
DIASTOLIC BLOOD PRESSURE: 70 MMHG | WEIGHT: 259.5 LBS | RESPIRATION RATE: 16 BRPM | OXYGEN SATURATION: 98 % | BODY MASS INDEX: 43.24 KG/M2 | HEART RATE: 83 BPM | SYSTOLIC BLOOD PRESSURE: 100 MMHG | HEIGHT: 65 IN

## 2020-11-19 DIAGNOSIS — J30.2 SEASONAL ALLERGIES: ICD-10-CM

## 2020-11-19 DIAGNOSIS — E11.9 TYPE 2 DIABETES MELLITUS WITHOUT COMPLICATION, WITHOUT LONG-TERM CURRENT USE OF INSULIN (HCC): Primary | ICD-10-CM

## 2020-11-19 DIAGNOSIS — E78.5 HYPERLIPIDEMIA, UNSPECIFIED HYPERLIPIDEMIA TYPE: ICD-10-CM

## 2020-11-19 PROCEDURE — 1036F TOBACCO NON-USER: CPT | Performed by: NURSE PRACTITIONER

## 2020-11-19 PROCEDURE — 99214 OFFICE O/P EST MOD 30 MIN: CPT | Performed by: NURSE PRACTITIONER

## 2020-11-19 PROCEDURE — 3008F BODY MASS INDEX DOCD: CPT | Performed by: NURSE PRACTITIONER

## 2020-11-19 RX ORDER — MONTELUKAST SODIUM 10 MG/1
10 TABLET ORAL
Qty: 90 TABLET | Refills: 3 | Status: SHIPPED | OUTPATIENT
Start: 2020-11-19 | End: 2021-12-06 | Stop reason: SDUPTHER

## 2020-11-23 ENCOUNTER — TELEPHONE (OUTPATIENT)
Dept: FAMILY MEDICINE CLINIC | Facility: CLINIC | Age: 35
End: 2020-11-23

## 2020-12-13 DIAGNOSIS — F41.9 ANXIETY: ICD-10-CM

## 2020-12-14 ENCOUNTER — TELEPHONE (OUTPATIENT)
Dept: FAMILY MEDICINE CLINIC | Facility: CLINIC | Age: 35
End: 2020-12-14

## 2020-12-14 DIAGNOSIS — E11.9 TYPE 2 DIABETES MELLITUS WITHOUT COMPLICATION, WITHOUT LONG-TERM CURRENT USE OF INSULIN (HCC): ICD-10-CM

## 2020-12-14 RX ORDER — FLUOXETINE HYDROCHLORIDE 20 MG/1
CAPSULE ORAL
Qty: 30 CAPSULE | Refills: 0 | Status: SHIPPED | OUTPATIENT
Start: 2020-12-14 | End: 2021-01-08

## 2021-01-04 ENCOUNTER — TELEPHONE (OUTPATIENT)
Dept: FAMILY MEDICINE CLINIC | Facility: CLINIC | Age: 36
End: 2021-01-04

## 2021-01-06 ENCOUNTER — TELEPHONE (OUTPATIENT)
Dept: FAMILY MEDICINE CLINIC | Facility: CLINIC | Age: 36
End: 2021-01-06

## 2021-01-08 DIAGNOSIS — F41.9 ANXIETY: ICD-10-CM

## 2021-01-08 RX ORDER — FLUOXETINE HYDROCHLORIDE 20 MG/1
CAPSULE ORAL
Qty: 30 CAPSULE | Refills: 0 | Status: SHIPPED | OUTPATIENT
Start: 2021-01-08 | End: 2021-02-08

## 2021-01-12 ENCOUNTER — TELEPHONE (OUTPATIENT)
Dept: FAMILY MEDICINE CLINIC | Facility: CLINIC | Age: 36
End: 2021-01-12

## 2021-01-12 DIAGNOSIS — K21.9 GASTROESOPHAGEAL REFLUX DISEASE WITHOUT ESOPHAGITIS: ICD-10-CM

## 2021-01-12 RX ORDER — FAMOTIDINE 40 MG/1
TABLET, FILM COATED ORAL
Qty: 90 TABLET | Refills: 3 | Status: SHIPPED | OUTPATIENT
Start: 2021-01-12 | End: 2022-04-18 | Stop reason: SDUPTHER

## 2021-01-12 NOTE — TELEPHONE ENCOUNTER
Patient informed, that her paperwork can be filled out based on her last Physical 1/2020  Patient agreed with plan of action  Patient has an upcoming appointment on 2/2/2021

## 2021-01-30 DIAGNOSIS — I82.4Y9 DEEP VEIN THROMBOSIS (DVT) OF PROXIMAL LOWER EXTREMITY, UNSPECIFIED CHRONICITY, UNSPECIFIED LATERALITY (HCC): ICD-10-CM

## 2021-02-01 DIAGNOSIS — I82.4Y9 DEEP VEIN THROMBOSIS (DVT) OF PROXIMAL LOWER EXTREMITY, UNSPECIFIED CHRONICITY, UNSPECIFIED LATERALITY (HCC): ICD-10-CM

## 2021-02-01 RX ORDER — WARFARIN SODIUM 10 MG/1
TABLET ORAL
Qty: 30 TABLET | Refills: 0 | Status: SHIPPED | OUTPATIENT
Start: 2021-02-01 | End: 2021-02-25

## 2021-02-01 NOTE — TELEPHONE ENCOUNTER
Advise she needs a PT/inrBeen since July    Has to be better about staying on target with labs   Wilfrido only give #30    Order placed for lab

## 2021-02-02 RX ORDER — WARFARIN SODIUM 10 MG/1
10 TABLET ORAL DAILY
Qty: 30 TABLET | Refills: 0 | OUTPATIENT
Start: 2021-02-02

## 2021-02-08 DIAGNOSIS — F41.9 ANXIETY: ICD-10-CM

## 2021-02-08 RX ORDER — FLUOXETINE HYDROCHLORIDE 20 MG/1
CAPSULE ORAL
Qty: 30 CAPSULE | Refills: 0 | Status: SHIPPED | OUTPATIENT
Start: 2021-02-08 | End: 2021-03-05

## 2021-02-23 ENCOUNTER — TREATMENT (OUTPATIENT)
Dept: FAMILY MEDICINE CLINIC | Facility: CLINIC | Age: 36
End: 2021-02-23

## 2021-02-23 ENCOUNTER — ANTICOAG VISIT (OUTPATIENT)
Dept: FAMILY MEDICINE CLINIC | Facility: CLINIC | Age: 36
End: 2021-02-23

## 2021-02-23 ENCOUNTER — TELEPHONE (OUTPATIENT)
Dept: FAMILY MEDICINE CLINIC | Facility: CLINIC | Age: 36
End: 2021-02-23

## 2021-02-23 ENCOUNTER — OFFICE VISIT (OUTPATIENT)
Dept: FAMILY MEDICINE CLINIC | Facility: CLINIC | Age: 36
End: 2021-02-23
Payer: COMMERCIAL

## 2021-02-23 VITALS
BODY MASS INDEX: 43.78 KG/M2 | TEMPERATURE: 96.6 F | DIASTOLIC BLOOD PRESSURE: 80 MMHG | HEIGHT: 65 IN | HEART RATE: 95 BPM | WEIGHT: 262.8 LBS | SYSTOLIC BLOOD PRESSURE: 112 MMHG | OXYGEN SATURATION: 95 %

## 2021-02-23 DIAGNOSIS — I82.4Y9 DEEP VEIN THROMBOSIS (DVT) OF PROXIMAL LOWER EXTREMITY, UNSPECIFIED CHRONICITY, UNSPECIFIED LATERALITY (HCC): ICD-10-CM

## 2021-02-23 DIAGNOSIS — I27.82 CHRONIC PULMONARY EMBOLISM WITHOUT ACUTE COR PULMONALE, UNSPECIFIED PULMONARY EMBOLISM TYPE (HCC): ICD-10-CM

## 2021-02-23 DIAGNOSIS — E11.9 TYPE 2 DIABETES MELLITUS WITHOUT COMPLICATION, WITHOUT LONG-TERM CURRENT USE OF INSULIN (HCC): Primary | ICD-10-CM

## 2021-02-23 DIAGNOSIS — Z00.00 ANNUAL PHYSICAL EXAM: ICD-10-CM

## 2021-02-23 LAB
ALBUMIN SERPL BCP-MCNC: 3.5 G/DL (ref 3.5–5)
ALP SERPL-CCNC: 84 U/L (ref 46–116)
ALT SERPL W P-5'-P-CCNC: 29 U/L (ref 12–78)
ANION GAP SERPL CALCULATED.3IONS-SCNC: 6 MMOL/L (ref 4–13)
AST SERPL W P-5'-P-CCNC: 12 U/L (ref 5–45)
BASOPHILS # BLD AUTO: 0.04 THOUSANDS/ΜL (ref 0–0.1)
BASOPHILS NFR BLD AUTO: 0 % (ref 0–1)
BILIRUB SERPL-MCNC: 0.46 MG/DL (ref 0.2–1)
BUN SERPL-MCNC: 13 MG/DL (ref 5–25)
CALCIUM SERPL-MCNC: 9.5 MG/DL (ref 8.3–10.1)
CHLORIDE SERPL-SCNC: 104 MMOL/L (ref 100–108)
CHOLEST SERPL-MCNC: 223 MG/DL (ref 50–200)
CO2 SERPL-SCNC: 27 MMOL/L (ref 21–32)
CREAT SERPL-MCNC: 0.66 MG/DL (ref 0.6–1.3)
CREAT UR-MCNC: 75.1 MG/DL
EOSINOPHIL # BLD AUTO: 0.26 THOUSAND/ΜL (ref 0–0.61)
EOSINOPHIL NFR BLD AUTO: 3 % (ref 0–6)
ERYTHROCYTE [DISTWIDTH] IN BLOOD BY AUTOMATED COUNT: 12.3 % (ref 11.6–15.1)
GFR SERPL CREATININE-BSD FRML MDRD: 115 ML/MIN/1.73SQ M
GLUCOSE SERPL-MCNC: 270 MG/DL (ref 65–140)
HCT VFR BLD AUTO: 47 % (ref 34.8–46.1)
HDLC SERPL-MCNC: 49 MG/DL
HGB BLD-MCNC: 15.6 G/DL (ref 11.5–15.4)
IMM GRANULOCYTES # BLD AUTO: 0.07 THOUSAND/UL (ref 0–0.2)
IMM GRANULOCYTES NFR BLD AUTO: 1 % (ref 0–2)
INR PPP: 2.37 (ref 0.84–1.19)
INR PPP: 2.37 (ref 0.84–1.19)
LDLC SERPL CALC-MCNC: 140 MG/DL (ref 0–100)
LYMPHOCYTES # BLD AUTO: 2.7 THOUSANDS/ΜL (ref 0.6–4.47)
LYMPHOCYTES NFR BLD AUTO: 27 % (ref 14–44)
MCH RBC QN AUTO: 30.1 PG (ref 26.8–34.3)
MCHC RBC AUTO-ENTMCNC: 33.2 G/DL (ref 31.4–37.4)
MCV RBC AUTO: 91 FL (ref 82–98)
MICROALBUMIN UR-MCNC: 19 MG/L (ref 0–20)
MICROALBUMIN/CREAT 24H UR: 25 MG/G CREATININE (ref 0–30)
MONOCYTES # BLD AUTO: 0.58 THOUSAND/ΜL (ref 0.17–1.22)
MONOCYTES NFR BLD AUTO: 6 % (ref 4–12)
NEUTROPHILS # BLD AUTO: 6.22 THOUSANDS/ΜL (ref 1.85–7.62)
NEUTS SEG NFR BLD AUTO: 63 % (ref 43–75)
NONHDLC SERPL-MCNC: 174 MG/DL
NRBC BLD AUTO-RTO: 0 /100 WBCS
PLATELET # BLD AUTO: 295 THOUSANDS/UL (ref 149–390)
PMV BLD AUTO: 10.7 FL (ref 8.9–12.7)
POTASSIUM SERPL-SCNC: 4.3 MMOL/L (ref 3.5–5.3)
PROT SERPL-MCNC: 7.6 G/DL (ref 6.4–8.2)
PROTHROMBIN TIME: 25.8 SECONDS (ref 11.6–14.5)
RBC # BLD AUTO: 5.18 MILLION/UL (ref 3.81–5.12)
SL AMB POCT HEMOGLOBIN AIC: 9.9 (ref ?–6.5)
SODIUM SERPL-SCNC: 137 MMOL/L (ref 136–145)
TRIGL SERPL-MCNC: 169 MG/DL
WBC # BLD AUTO: 9.87 THOUSAND/UL (ref 4.31–10.16)

## 2021-02-23 PROCEDURE — 3008F BODY MASS INDEX DOCD: CPT | Performed by: NURSE PRACTITIONER

## 2021-02-23 PROCEDURE — 82570 ASSAY OF URINE CREATININE: CPT | Performed by: NURSE PRACTITIONER

## 2021-02-23 PROCEDURE — 83036 HEMOGLOBIN GLYCOSYLATED A1C: CPT | Performed by: NURSE PRACTITIONER

## 2021-02-23 PROCEDURE — 3061F NEG MICROALBUMINURIA REV: CPT | Performed by: NURSE PRACTITIONER

## 2021-02-23 PROCEDURE — 82043 UR ALBUMIN QUANTITATIVE: CPT | Performed by: NURSE PRACTITIONER

## 2021-02-23 PROCEDURE — 1036F TOBACCO NON-USER: CPT | Performed by: NURSE PRACTITIONER

## 2021-02-23 PROCEDURE — 85025 COMPLETE CBC W/AUTO DIFF WBC: CPT | Performed by: NURSE PRACTITIONER

## 2021-02-23 PROCEDURE — 80061 LIPID PANEL: CPT | Performed by: NURSE PRACTITIONER

## 2021-02-23 PROCEDURE — 99395 PREV VISIT EST AGE 18-39: CPT | Performed by: NURSE PRACTITIONER

## 2021-02-23 PROCEDURE — 3046F HEMOGLOBIN A1C LEVEL >9.0%: CPT | Performed by: NURSE PRACTITIONER

## 2021-02-23 PROCEDURE — 85610 PROTHROMBIN TIME: CPT | Performed by: NURSE PRACTITIONER

## 2021-02-23 PROCEDURE — 80053 COMPREHEN METABOLIC PANEL: CPT | Performed by: NURSE PRACTITIONER

## 2021-02-23 PROCEDURE — 36415 COLL VENOUS BLD VENIPUNCTURE: CPT | Performed by: NURSE PRACTITIONER

## 2021-02-23 NOTE — PROGRESS NOTES
Assessment/Plan:         Diagnoses and all orders for this visit:    Annual physical exam    Type 2 diabetes mellitus without complication, without long-term current use of insulin ([facility])  -     Lipid panel  -     CBC and differential  -     Comprehensive metabolic panel  -     Microalbumin / creatinine urine ratio  -     Ambulatory referral to Endocrinology; Future    Deep vein thrombosis (DVT) of proximal lower extremity, unspecified chronicity, unspecified laterality ([facility] )  -     Protime-INR    Other orders  -     Cancel: HIV 1/2 Antigen/Antibody (4th Generation) w Reflex [facility]; Future  -     Cancel: Ambulatory Referral to Ophthalmology; Future          Subjective:      Patient ID: Francisco Horton is a 28 y o  female  HPI    The following portions of the patient's history were reviewed and updated as appropriate: allergies, current medications, past family history, past medical history, past social history, past surgical history and problem list     Review of Systems   Constitutional: Negative for chills and fever  HENT: Negative for congestion, ear pain, postnasal drip and sore throat  Eyes: Negative for pain and visual disturbance  Respiratory: Negative for cough, chest tightness and shortness of breath  Cardiovascular: Negative for chest pain and palpitations  Gastrointestinal: Negative for abdominal pain, constipation, diarrhea and vomiting  Genitourinary: Negative for dysuria and hematuria  Musculoskeletal: Negative for arthralgias and back pain  Skin: Negative for color change and rash  Neurological: Negative for dizziness, seizures, syncope and light-headedness  Psychiatric/Behavioral: The patient is not nervous/anxious  All other systems reviewed and are negative          Objective:  Vitals:    02/23/21 0729   BP: 112/80   BP Location: Left arm   Patient Position: Sitting   Cuff Size: Large   Pulse: 95   Temp: (!) 96 6 °F (35 9 °C)   TempSrc: Tympanic   SpO2: 95%   Weight: 119 kg (262 lb 12 8 oz)   Height: 5' 5" (1 651 m)   Patient's shoes and socks removed  Right Foot/Ankle   Right Foot Inspection  Skin Exam: skin normal and skin intact no dry skin, no warmth, no callus, no erythema, no maceration, no abnormal color, no pre-ulcer, no ulcer and no callus                          Toe Exam: ROM and strength within normal limits  Sensory       Monofilament testing: intact  Vascular  Capillary refills: < 3 seconds  The right DP pulse is 2+  Left Foot/Ankle  Left Foot Inspection                           Toe Exam: ROM and strength within normal limits                   Sensory       Monofilament: intact  Vascular  Capillary refills: < 3 seconds  The left DP pulse is 2+  Assign Risk Category:  No deformity present; No loss of protective sensation; No weak pulses       Risk: 0     Physical Exam  Vitals signs reviewed  Constitutional:       Appearance: Normal appearance  She is obese  Cardiovascular:      Pulses: no weak pulses          Dorsalis pedis pulses are 2+ on the right side and 2+ on the left side  Feet:      Right foot:      Skin integrity: No ulcer, skin breakdown, erythema, warmth, callus or dry skin  Neurological:      Mental Status: She is alert

## 2021-02-23 NOTE — PATIENT INSTRUCTIONS

## 2021-02-25 DIAGNOSIS — I82.4Y9 DEEP VEIN THROMBOSIS (DVT) OF PROXIMAL LOWER EXTREMITY, UNSPECIFIED CHRONICITY, UNSPECIFIED LATERALITY (HCC): ICD-10-CM

## 2021-02-25 RX ORDER — WARFARIN SODIUM 10 MG/1
TABLET ORAL
Qty: 30 TABLET | Refills: 0 | Status: SHIPPED | OUTPATIENT
Start: 2021-02-25 | End: 2021-03-29

## 2021-03-05 DIAGNOSIS — F41.9 ANXIETY: ICD-10-CM

## 2021-03-05 RX ORDER — FLUOXETINE HYDROCHLORIDE 20 MG/1
CAPSULE ORAL
Qty: 30 CAPSULE | Refills: 0 | Status: SHIPPED | OUTPATIENT
Start: 2021-03-05 | End: 2021-03-26

## 2021-03-22 DIAGNOSIS — E11.9 TYPE 2 DIABETES MELLITUS WITHOUT COMPLICATION, WITHOUT LONG-TERM CURRENT USE OF INSULIN (HCC): ICD-10-CM

## 2021-03-23 NOTE — TELEPHONE ENCOUNTER
Patient states she gets medication from an assistance program  Print script and will have to call patient for details of where it comes from  She did not have that information on hand when she called

## 2021-03-24 ENCOUNTER — TELEPHONE (OUTPATIENT)
Dept: FAMILY MEDICINE CLINIC | Facility: CLINIC | Age: 36
End: 2021-03-24

## 2021-03-24 NOTE — TELEPHONE ENCOUNTER
Left message for patient  Regarding patient assistance program with [employer] for [address]   Patient will need to go on line to submit form since it requires private information such as income and SS #

## 2021-03-26 DIAGNOSIS — F41.9 ANXIETY: ICD-10-CM

## 2021-03-26 RX ORDER — FLUOXETINE HYDROCHLORIDE 20 MG/1
CAPSULE ORAL
Qty: 30 CAPSULE | Refills: 0 | Status: SHIPPED | OUTPATIENT
Start: 2021-03-26 | End: 2021-04-26

## 2021-03-29 DIAGNOSIS — I82.4Y9 DEEP VEIN THROMBOSIS (DVT) OF PROXIMAL LOWER EXTREMITY, UNSPECIFIED CHRONICITY, UNSPECIFIED LATERALITY (HCC): ICD-10-CM

## 2021-03-29 RX ORDER — WARFARIN SODIUM 10 MG/1
TABLET ORAL
Qty: 30 TABLET | Refills: 0 | Status: SHIPPED | OUTPATIENT
Start: 2021-03-29 | End: 2021-04-26

## 2021-04-01 ENCOUNTER — TELEMEDICINE (OUTPATIENT)
Dept: FAMILY MEDICINE CLINIC | Facility: CLINIC | Age: 36
End: 2021-04-01
Payer: COMMERCIAL

## 2021-04-01 VITALS — TEMPERATURE: 97 F

## 2021-04-01 DIAGNOSIS — A08.4 VIRAL GASTROENTERITIS: Primary | ICD-10-CM

## 2021-04-01 PROCEDURE — 99214 OFFICE O/P EST MOD 30 MIN: CPT | Performed by: NURSE PRACTITIONER

## 2021-04-01 RX ORDER — ONDANSETRON 4 MG/1
4 TABLET, FILM COATED ORAL EVERY 8 HOURS PRN
Qty: 20 TABLET | Refills: 0 | Status: SHIPPED | OUTPATIENT
Start: 2021-04-01 | End: 2021-06-17

## 2021-04-01 RX ORDER — LOPERAMIDE HYDROCHLORIDE 2 MG/1
2 CAPSULE ORAL 4 TIMES DAILY PRN
Qty: 30 CAPSULE | Refills: 0 | Status: SHIPPED | OUTPATIENT
Start: 2021-04-01 | End: 2021-06-17

## 2021-04-01 NOTE — PROGRESS NOTES
Virtual Regular Visit      Assessment/Plan:    Problem List Items Addressed This Visit     None      Visit Diagnoses     Viral gastroenteritis    -  Primary    Relevant Medications    ondansetron (ZOFRAN) 4 mg tablet    loperamide (IMODIUM) 2 mg capsule               Reason for visit is   Chief Complaint   Patient presents with    Diarrhea    Nausea    Virtual Regular Visit        Encounter provider DYLAN Del Cid    Provider located at [facility] AT [place] [address] [id number]      Recent Visits  No visits were found meeting these conditions  Showing recent visits within past 7 days and meeting all other requirements     Today's Visits  Date Type Provider Dept   04/01/21 Telemedicine Eliud Enriquez, [address]   Showing today's visits and meeting all other requirements     Future Appointments  No visits were found meeting these conditions  Showing future appointments within next 150 days and meeting all other requirements        The patient was identified by name and date of birth  Marybethtrevon Lewis was informed that this is a telemedicine visit and that the visit is being conducted through [facility] and patient was informed that this is a secure, HIPAA-compliant platform  She agrees to proceed     My office door was closed  No one else was in the room  She acknowledged consent and understanding of privacy and security of the video platform  The patient has agreed to participate and understands they can discontinue the visit at any time  Patient is aware this is a billable service  Raúl Torres is a 28 y o  female with nausea and diarrhea   Since today via telemedicine for complaints of nausea without vomiting and also diarrhea since around 2:00 a m  She has had approximately 6 loose diarrhea stools today which are normal in color not frothy    She has not eaten anything that the rest of the family has not and no one else is ill  She has been able to drink and keep fluids down but is afraid to eat  She is fully vaccinated as of February 26th with the med during a COVID-19 vaccine  She has no fever, cough she can taste and smell, no significant fatigue       Past Medical History:   Diagnosis Date    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HCC)     Epistaxis     Factor V Leiden mutation ([facility] )     Herpes zoster     Hypotension     Menorrhagia     Morbid obesity with BMI of 45 0-49 9, adult ([facility] ) 8/5/2019       Past Surgical History:   Procedure Laterality Date    DILATION AND CURETTAGE OF UTERUS      VENA CAVA FILTER PLACEMENT      INTERRUPTION INFERIOR, GREENFIRELD FILTER PLACEMENT       Current Outpatient Medications   Medication Sig Dispense Refill    Blood Glucose Monitoring Suppl MISC test one time daily      cetirizine (ZyrTEC) 10 mg tablet Take 1 tablet (10 mg total) by mouth daily 30 tablet 2    clobetasol (TEMOVATE) 0 05 % ointment Apply topically 2 (two) times a day For max of 2 weeks at a time   45 g 0    Continuous Blood Gluc  (FREESTYLE JOHN 14 DAY READER) GRISEL Reading daily 1 Device 1    Continuous Blood Gluc Sensor (FreeStyle John 14 Day Sensor) MISC USE TO TEST DAILY 100 each 5    famotidine (PEPCID) 40 MG tablet TAKE 1 TABLET BY MOUTH DAILY AT BEDTIME 90 tablet 3    FLUoxetine (PROzac) 20 mg capsule TAKE 1 CAPSULE BY MOUTH EVERY DAY 30 capsule 0    glucose blood test strip test one time daily      hydrOXYzine HCL (ATARAX) 25 mg tablet TAKE 1 TABLET (25 MG TOTAL) BY MOUTH EVERY 6 (SIX) HOURS AS NEEDED FOR ANXIETY 20 tablet 0    Lancets MISC test one time daily      liraglutide (VICTOZA) injection Inject 0 1 mL (0 6 mg total) under the skin daily 3 pen 2    metFORMIN (GLUCOPHAGE) 1000 MG tablet Take 1 tablet (1,000 mg total) by mouth daily with breakfast      montelukast (SINGULAIR) 10 mg tablet Take 1 tablet (10 mg total) by mouth daily at bedtime 90 tablet 3    sitaGLIPtin (JANUVIA) 50 mg tablet Take 1 tablet (50 mg total) by mouth daily 90 tablet 3    triamcinolone (KENALOG) 0 1 % ointment APPLY TO AFFECTED AREA TWICE A DAY 30 g 0    warfarin (COUMADIN) 10 mg tablet TAKE 1 TABLET BY MOUTH EVERY DAY 30 tablet 0    loperamide (IMODIUM) 2 mg capsule Take 1 capsule (2 mg total) by mouth 4 (four) times a day as needed for diarrhea 30 capsule 0    ondansetron (ZOFRAN) 4 mg tablet Take 1 tablet (4 mg total) by mouth every 8 (eight) hours as needed for nausea or vomiting 20 tablet 0     No current facility-administered medications for this visit  Allergies   Allergen Reactions    No Known Allergies     Other      seasonal       Review of Systems   Constitutional: Negative for chills, fatigue and fever  Respiratory: Negative for cough and shortness of breath  Gastrointestinal: Positive for diarrhea and nausea  Negative for abdominal pain, blood in stool and vomiting  Psychiatric/Behavioral: Negative for dysphoric mood  The patient is not nervous/anxious  Video Exam    Vitals:    04/01/21 1135   Temp: (!) 97 °F (36 1 °C)   TempSrc: Temporal       Physical Exam  Vitals signs reviewed  Constitutional:       Appearance: Normal appearance  Pulmonary:      Effort: Pulmonary effort is normal  No respiratory distress (No dyspnea noted while conversing)  Abdominal:      Palpations: Abdomen is soft  Tenderness: There is no abdominal tenderness  Comments: Per patient palpation   Neurological:      Mental Status: She is alert and oriented to person, place, and time  Psychiatric:         Mood and Affect: Mood normal          Behavior: Behavior normal           I spent 15 minutes with patient today in which greater than 50% of the time was spent in counseling/coordination of care regarding nausea and diarrhea      VIRTUAL VISIT DISCLAIMER    Angelica Burgos acknowledges that she has consented to an online visit or consultation   She understands that the online visit is based solely on information provided by her, and that, in the absence of a face-to-face physical evaluation by the physician, the diagnosis she receives is both limited and provisional in terms of accuracy and completeness  This is not intended to replace a full medical face-to-face evaluation by the physician  Angelica Avalos understands and accepts these terms

## 2021-04-05 ENCOUNTER — TELEPHONE (OUTPATIENT)
Dept: FAMILY MEDICINE CLINIC | Facility: CLINIC | Age: 36
End: 2021-04-05

## 2021-04-05 NOTE — TELEPHONE ENCOUNTER
patient called and states you told her that she could have a note for missing work on Friday because of the viral infection she had-you did a virtual with her on Thursday  Is it ok to generate a note for her?

## 2021-04-14 ENCOUNTER — CONSULT (OUTPATIENT)
Dept: ENDOCRINOLOGY | Facility: CLINIC | Age: 36
End: 2021-04-14
Payer: COMMERCIAL

## 2021-04-14 VITALS
WEIGHT: 268.5 LBS | HEIGHT: 65 IN | SYSTOLIC BLOOD PRESSURE: 110 MMHG | HEART RATE: 96 BPM | BODY MASS INDEX: 44.73 KG/M2 | DIASTOLIC BLOOD PRESSURE: 80 MMHG

## 2021-04-14 DIAGNOSIS — E11.9 TYPE 2 DIABETES MELLITUS WITHOUT COMPLICATION, WITHOUT LONG-TERM CURRENT USE OF INSULIN (HCC): Primary | ICD-10-CM

## 2021-04-14 DIAGNOSIS — I82.5Y9 CHRONIC DEEP VEIN THROMBOSIS (DVT) OF PROXIMAL VEIN OF LOWER EXTREMITY, UNSPECIFIED LATERALITY (HCC): ICD-10-CM

## 2021-04-14 DIAGNOSIS — E55.9 VITAMIN D DEFICIENCY: ICD-10-CM

## 2021-04-14 DIAGNOSIS — E78.5 HYPERLIPIDEMIA, UNSPECIFIED HYPERLIPIDEMIA TYPE: ICD-10-CM

## 2021-04-14 DIAGNOSIS — E66.01 MORBID OBESITY WITH BMI OF 45.0-49.9, ADULT (HCC): ICD-10-CM

## 2021-04-14 PROCEDURE — 1036F TOBACCO NON-USER: CPT | Performed by: INTERNAL MEDICINE

## 2021-04-14 PROCEDURE — 99204 OFFICE O/P NEW MOD 45 MIN: CPT | Performed by: INTERNAL MEDICINE

## 2021-04-14 NOTE — PROGRESS NOTES
New Patient Note      CC: type 2 diabetes, morbid obesity    History of Present Illness:   28 yr female with type 2 diabetes 2015, morbid obesity, Factor V lieden mutation with VTE in 2007 and dislipidemia  She was referred by PCP for diabetes management  She reports mild polyuria, polydipsia and nocturia over last 2 years  She also had a 40 lb weight loss over last year  She has never seen an endocrinologist or diabetes education  She was prescribed liraglutide but could not afford it  Recent A1c was 9 9% and she was advised to see endocrinology  She admits to ignoring her diabetes initially  Diet:   Eats 3 meals a day  Does not count carbohydrates  Admits to dietary indiscretions  Home blood glucose monitoring: uses a freestyle lubna CGM  Data reviewed from 3/31/21 to 4/13/21  Usage 8% only  Av Gl: 231mg/dL  She has both fasting and postprandial hyperglycemia but data insufficient to interpret a full AGP    Hypoglycemia: No    Current meds:  Metformin 1000mg qd  Januvia 50mg qdaily    Opthamology: Yesterday - reportedly normal  Podiatry: No; reports some pain bilateral feet  Influenza: Yes, COVID 19  Dental: No  Pancreatitis: No     Ace/ARB: No  Statin: No  Thyroid issues: No    Patient Active Problem List   Diagnosis    Anxiety state    [facility]    [facility]DVT) ([facility])    Factor V Leiden mutation ([facility] )    Hyperlipidemia    Pulmonary embolism ([facility] )    Gynecologic exam normal    Vulvar itching    Encounter for IUD insertion    Rash    Sprain of unspecified ligament of right ankle, initial encounter    Morbid obesity with BMI of 45 0-49 9, adult [facility])     Past Medical History:   Diagnosis Date    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HCC)     Epistaxis     Factor V Leiden mutation ([facility] )     Herpes zoster     Hypotension     Menorrhagia     Morbid obesity with BMI of 45 0-49 9, adult ([facility] ) 8/5/2019      Past Surgical History:   Procedure Laterality Date    DILATION AND CURETTAGE OF UTERUS      VENA CAVA FILTER PLACEMENT      INTERRUPTION INFERIOR, GREENFIRELD FILTER PLACEMENT      Family History   Problem Relation Age of Onset    Diabetes Father     Cancer Family     Stroke Family     Heart disease Family     Hypercoagulant Ability Family     Factor V Leiden deficiency Family         MUTATION     Social History     Tobacco Use    Smoking status: Former Smoker     Quit date: 1/1/2018     Years since quitting: 3 2    Smokeless tobacco: Former User     Quit date: 1/8/2018    Tobacco comment: TOBACCO USE   Substance Use Topics    Alcohol use: Yes     Frequency: 2-4 times a month     Comment: rarely     Allergies   Allergen Reactions    No Known Allergies     Other      seasonal         Current Outpatient Medications:     Blood Glucose Monitoring Suppl MISC, test one time daily, Disp: , Rfl:     cetirizine (ZyrTEC) 10 mg tablet, Take 1 tablet (10 mg total) by mouth daily, Disp: 30 tablet, Rfl: 2    clobetasol (TEMOVATE) 0 05 % ointment, Apply topically 2 (two) times a day For max of 2 weeks at a time  , Disp: 45 g, Rfl: 0    Continuous Blood Gluc  (FREESTYLE JOHN 14 DAY READER) [facility], Reading daily, Disp: 1 Device, Rfl: 1    Continuous Blood Gluc Sensor (FreeStyle John 14 Day Sensor) MISC, USE TO TEST DAILY, Disp: 100 each, Rfl: 5    famotidine (PEPCID) 40 MG tablet, TAKE 1 TABLET BY MOUTH DAILY AT BEDTIME, Disp: 90 tablet, Rfl: 3    FLUoxetine (PROzac) 20 mg capsule, TAKE 1 CAPSULE BY MOUTH EVERY DAY, Disp: 30 capsule, Rfl: 0    glucose blood test strip, test one time daily, Disp: , Rfl:     hydrOXYzine HCL (ATARAX) 25 mg tablet, TAKE 1 TABLET (25 MG TOTAL) BY MOUTH EVERY 6 (SIX) HOURS AS NEEDED FOR ANXIETY, Disp: 20 tablet, Rfl: 0    Lancets MISC, test one time daily, Disp: , Rfl:     metFORMIN (GLUCOPHAGE) 1000 MG tablet, Take 1 tablet (1,000 mg total) by mouth daily with breakfast, Disp: , Rfl:     montelukast (SINGULAIR) 10 mg tablet, Take 1 tablet (10 mg total) by mouth daily at bedtime, Disp: 90 tablet, Rfl: 3    sitaGLIPtin (JANUVIA) 50 mg tablet, Take 1 tablet (50 mg total) by mouth daily, Disp: 90 tablet, Rfl: 3    triamcinolone (KENALOG) 0 1 % ointment, APPLY TO AFFECTED AREA TWICE A DAY, Disp: 30 g, Rfl: 0    warfarin (COUMADIN) 10 mg tablet, TAKE 1 TABLET BY MOUTH EVERY DAY, Disp: 30 tablet, Rfl: 0    liraglutide (VICTOZA) injection, Inject 0 1 mL (0 6 mg total) under the skin daily (Patient not taking: Reported on 4/14/2021), Disp: 3 pen, Rfl: 2    loperamide (IMODIUM) 2 mg capsule, Take 1 capsule (2 mg total) by mouth 4 (four) times a day as needed for diarrhea (Patient not taking: Reported on 4/14/2021), Disp: 30 capsule, Rfl: 0    ondansetron (ZOFRAN) 4 mg tablet, Take 1 tablet (4 mg total) by mouth every 8 (eight) hours as needed for nausea or vomiting (Patient not taking: Reported on 4/14/2021), Disp: 20 tablet, Rfl: 0    Review of Systems   Constitutional: Positive for fatigue  HENT: Negative  Eyes: Negative  Respiratory: Negative  Cardiovascular: Negative  Gastrointestinal: Negative  Endocrine: Positive for polydipsia, polyphagia and polyuria  Negative for cold intolerance and heat intolerance  Musculoskeletal: Negative  Skin: Negative  Allergic/Immunologic: Negative  Neurological: Negative  Hematological: Negative  Psychiatric/Behavioral: Negative  Physical Exam:  Body mass index is 44 68 kg/m²  /80 (BP Location: Left arm, Patient Position: Sitting, Cuff Size: Large)   Pulse 96   Ht 5' 5" (1 651 m)   Wt 122 kg (268 lb 8 oz)   BMI 44 68 kg/m²    Vitals:    04/14/21 1455   Weight: 122 kg (268 lb 8 oz)        Physical Exam  Constitutional:       Appearance: She is well-developed  HENT:      Head: Normocephalic  Eyes:      Pupils: Pupils are equal, round, and reactive to light  Neck:      Musculoskeletal: Normal range of motion  Thyroid: No thyromegaly  Comments: No thyromegaly  Increased neck circumference  Cardiovascular:      Rate and Rhythm: Normal rate  Heart sounds: Normal heart sounds  Pulmonary:      Effort: Pulmonary effort is normal       Breath sounds: Normal breath sounds  Abdominal:      General: Bowel sounds are normal       Palpations: Abdomen is soft  Musculoskeletal:         General: No deformity  Skin:     Capillary Refill: Capillary refill takes less than 2 seconds  Coloration: Skin is not pale  Findings: No rash  Neurological:      Mental Status: She is alert and oriented to person, place, and time  Labs:   Lab Results   Component Value Date    HGBA1C 9 9 (A) 02/23/2021       Lab Results   Component Value Date    NWN5LAJPNCBX 1 710 02/08/2019       Lab Results   Component Value Date    CREATININE 0 66 02/23/2021    CREATININE 0 62 02/08/2019    CREATININE 0 61 08/31/2018    BUN 13 02/23/2021    K 4 3 02/23/2021     02/23/2021    CO2 27 02/23/2021     eGFR   Date Value Ref Range Status   02/23/2021 115 ml/min/1 73sq m Final       Lab Results   Component Value Date    ALT 29 02/23/2021    AST 12 02/23/2021    ALKPHOS 84 02/23/2021       Lab Results   Component Value Date    CHOLESTEROL 223 (H) 02/23/2021    CHOLESTEROL 179 02/08/2019     Lab Results   Component Value Date    HDL 49 02/23/2021    HDL 42 02/08/2019     Lab Results   Component Value Date    TRIG 169 (H) 02/23/2021    TRIG 148 02/08/2019     Lab Results   Component Value Date    [place] 174 02/23/2021    [place] 137 02/08/2019         Impression:  1  Morbid obesity with BMI of 45 0-49 9, adult ([facility] )    2  Type 2 diabetes mellitus without complication, without long-term current use of insulin ([facility] )    3  Hyperlipidemia, unspecified hyperlipidemia type    4   Chronic deep vein thrombosis (DVT) of proximal vein of lower extremity, unspecified laterality (HCC)         Plan:    Diagnoses and all orders for this visit:    Type 2 diabetes mellitus without complication, without long-term current use of insulin ([facility] )  She is uncontrolled with A1c 9 9%  Goal is 6 5% or less  She has severe metabolic syndrome with past non compliance with diabetes care but now seems motivated  She was not using her lubna appropriately in spite of self funding it  Today she was educated about proper use of CGM and advised to share data every few weeks  Diabetes goals, complications, follow up needs, education, medical fitness training, daily activity, diet and medications including GLP1 agonists were discussed  Advised to call with questions  Follow up ion 3 months  -     Ambulatory referral to Endocrinology  -     Ambulatory referral to Diabetic Education; Future  -     metFORMIN (GLUCOPHAGE) 1000 MG tablet; Take 1 tablet (1,000 mg total) by mouth 2 (two) times a day with meals  -     Dulaglutide 0 75 MG/0 5ML SOPN; Inject 0 5 mL (0 75 mg total) under the skin once a week  -     Hemoglobin A1C; Future    Morbid obesity with BMI of 45 0-49 9, adult ([facility] )  Monitor with GLP and above plan  -     TSH, 3rd generation; Future  -     T4, free; Future    Hyperlipidemia, unspecified hyperlipidemia type  She has both hypercholesterolemia and hypertriglyceridemia  Advised that she may need statin if she has elevated lipids in spite of lifestyle changes  Vitamin D deficiency  -     Vitamin D 25 hydroxy; Future      I have spent 50 minutes with patient today in which greater than 50% of this time was spent in counseling/coordination of care  Discussed with the patient and all questioned fully answered  She will call me if any problems arise  Educated/ Counseled patient on diagnostic test results, prognosis, risk vs benefit of treatment options, importance of treatment compliance, healthy life and lifestyle choices        [address]

## 2021-04-14 NOTE — LETTER
Medical Fitness Referral    Patient: Lauren Ferris  : 1985  MRN: [id number]  Address: [address] [place] [address]  Phone Number: [phone]  E-mail: [email or web address]    [] Medical Disorders (Ex  Diabetes)   [] Cardiovascular Disorders (Ex  Hypertension)   [] Pulmonary Disorders (Ex  Asthma)   [] Cancer Disorders (Ex  Breast, Prostate)   [] Immunological & Hematological Disorders (Rheumatoid Arthritis)   [] Neurological Disorders (Ex  Parkinson's Disease)   [] Cognitive Disorders (Ex  Dementia)   [] Children & Adolescents (Ex  BMI)   [] Older Adults (Ex  Balance & Ambulation)   [] Female Specific Disorders (Ex  Osteoporosis)       Diagnoses:   1  Morbid obesity with BMI of 45 0-49 9, adult ([facility] )     2  Type 2 diabetes mellitus without complication, without long-term current use of insulin ([facility] )  Ambulatory referral to Endocrinology    metFORMIN (GLUCOPHAGE) 1000 MG tablet   3  Hyperlipidemia, unspecified hyperlipidemia type     4   Chronic deep vein thrombosis (DVT) of proximal vein of lower extremity, unspecified laterality (HCC)  metFORMIN (GLUCOPHAGE) 1000 MG tablet     Additional Comments:    Service Requested:  [x] Medical Fitness Consult- Screening For Appropriateness of Medical Fitness Program   [x] Medical Fitness Program- Assessment of Body Composition, Aerobic, Anaerobic, Muscle Strength & Endurance, Flexibility, Neuromuscular & Functional Fitness   [x] Medical Fitness Assessment- Individualized Evidence-Based Exercise Program       Requesting Provider: Seun Navarro MD  Date: 21

## 2021-04-25 DIAGNOSIS — F41.9 ANXIETY: ICD-10-CM

## 2021-04-25 DIAGNOSIS — I82.4Y9 DEEP VEIN THROMBOSIS (DVT) OF PROXIMAL LOWER EXTREMITY, UNSPECIFIED CHRONICITY, UNSPECIFIED LATERALITY (HCC): ICD-10-CM

## 2021-04-26 RX ORDER — FLUOXETINE HYDROCHLORIDE 20 MG/1
CAPSULE ORAL
Qty: 30 CAPSULE | Refills: 0 | Status: SHIPPED | OUTPATIENT
Start: 2021-04-26 | End: 2021-05-24

## 2021-04-26 RX ORDER — WARFARIN SODIUM 10 MG/1
TABLET ORAL
Qty: 30 TABLET | Refills: 0 | Status: SHIPPED | OUTPATIENT
Start: 2021-04-26 | End: 2021-05-24

## 2021-04-30 ENCOUNTER — OFFICE VISIT (OUTPATIENT)
Dept: DIABETES SERVICES | Facility: CLINIC | Age: 36
End: 2021-04-30
Payer: COMMERCIAL

## 2021-04-30 ENCOUNTER — TELEPHONE (OUTPATIENT)
Dept: DIABETES SERVICES | Facility: CLINIC | Age: 36
End: 2021-04-30

## 2021-04-30 VITALS — HEIGHT: 65 IN | WEIGHT: 269.8 LBS | BODY MASS INDEX: 44.95 KG/M2

## 2021-04-30 DIAGNOSIS — E11.9 TYPE 2 DIABETES MELLITUS WITHOUT COMPLICATION, WITHOUT LONG-TERM CURRENT USE OF INSULIN (HCC): Primary | ICD-10-CM

## 2021-04-30 PROCEDURE — G0108 DIAB MANAGE TRN  PER INDIV: HCPCS | Performed by: DIETITIAN, REGISTERED

## 2021-04-30 PROCEDURE — 3008F BODY MASS INDEX DOCD: CPT | Performed by: INTERNAL MEDICINE

## 2021-04-30 NOTE — PROGRESS NOTES
Type 2 Diabetes Class Assessment    HPI: Met with Jamal Roth for DSME Initial visit  Angelica has Type 2 Diabetes  Most recent HbA1c 9 9%  Angelica reports being diagnosed with T2DM six years ago to the day  Discussed making lifestyle changes and Angelica stated "I have to do it, I'm  now and I feel like if I don't do it I'm going to let myself go again"  She reports that she lost 40 lb from 2020 to 2020, and manas since gained 6 lbs back  Angelica is scheduled to begin attending [facility] starting next week and will call with any questions  Diabetes Assessment  Visit Type: Initial visit  Present at Session: patient   Special Learning Needs: No  Barriers to Learning: no barriers    How do you feel about making lifestyle changes at this time? "I have to do it, I'm  now and I feel like if I don't do it I'm going to let myself go again "  How would you rate your current knowledge of diabetes? fair  How confident are you that will be able to take better control of your diabetes?: very good    How long have you had diabetes? 6 years ago today was diagnosed  Have you had diabetes education in the past?: No  Do you have any family members with diabetes?: Yes - dad and maternal grandmother   Both type 2  Do you monitor your blood sugar? yes  Type of blood sugar monitor: Freestyle Rohini, not wearing today  How old is your meter?: started using Rohini within last 6 months  How often do you test your blood sugars?:checking Rohini in morning and at night  Do you keep a written record of your blood sugars? No   Blood sugar log with patient today and reviewed by educator?: No   Blood Sugar ranges:    Fastin-240 mg/dL   Before meals: n/a   2 hours after meals: n/a   Bedtime: 170 -197 mg/dL  Any financial concerns pertaining to your diabetes supplies, medication or care?: Yes - Trulicity was $134 per month so did not get filled, not taking     Have you ever experienced hypoglycemia?:  No, but did have a BG of 100 that she treated by drinking 8 oz OJ  Have you ever been hospitalized or gone to the ER due to your blood sugars?: No  How do you treat low blood sugars?: drink 8 oz OJ  Educated on 15/15 rule  How do you treat high blood sugars? Does not know  Educated on drinking water and exercise  Do you wear a Diabetes I D ?: no  Where do you dispose of your sharps (needles,lancetes)?: rolls up in paper towel and throw away  Educated on PA sharps disposal     Ht Readings from Last 1 Encounters:   04/14/21 5' 5" (1 651 m)     Wt Readings from Last 3 Encounters:   04/14/21 122 kg (268 lb 8 oz)   02/23/21 119 kg (262 lb 12 8 oz)   11/19/20 118 kg (259 lb 8 oz)        There is no height or weight on file to calculate BMI       Lab Results   Component Value Date    HGBA1C 9 9 (A) 02/23/2021    HGBA1C 9 0 (A) 08/26/2020    HGBA1C 10 1 (A) 01/29/2020       No results found for: CHOL  Lab Results   Component Value Date    HDL 49 02/23/2021    HDL 42 02/08/2019     Lab Results   Component Value Date    LDLCALC 140 (H) 02/23/2021    LDLCALC 107 (H) 02/08/2019     Lab Results   Component Value Date    TRIG 169 (H) 02/23/2021    TRIG 148 02/08/2019     No results found for: CHOLHDL  No results found for: Mariposa Diana    Weight Change: Yes lost 40 lb from March 2020 to June 2020, has gained 6 lbs bakc since then    Diet Assessment    Do you follow any special diet presently?: No  Who cooks at home?:  mother in law  Who does the grocery shopping?: patient   How frequently do you eat out?: twice per week    Activity Assessment    Exercise: no structured exercise but 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    Lifestyle/Social Assessment    Racial/ethnic group:                                       Primary Language: English  Marital Status:   Education Level: [address]   Work status: Full Time  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  Who lives in your household?: spouse, mother in law, niece  Who is you primary support person(s): spouse, John  Describe your quality of life currently?: excellent  Any concerns for your safety?: No  Any Pentecostalism or cultural practices that may affect your diabetes care: No  Do you have a decrease or loss of hearing?: Yes - thinks maybe slight loss in her left ear  Do you have a decrease or loss of vision?: No  When was the last time you had an ophthalmology exam?: 3 weeks ago  When was the last time you had dental exam?: over 5 years ago  Do you check your feet for cracks, sores, debris?: Yes  When was the last time you had podiatry or foot exam?: 2/23/2021  Last flu shot?: November 2020  Pneumonia shot?: No      The patient's history was reviewed and updated as appropriate: allergies, current medications  Intervention    Diabetes Overview :   Angelica was instructed on basic concepts of diabetes, including identifying role of diabetes self management, basic pathophysiology and types of diabetes, A1c and blood sugar targets  Angelica has good understanding of material covered  Taking Medications: Instructed patient on action, side effects, efficacy, prescribed dosage and appropriate timing and frequency of administration of her diabetes medication  Angelica has good understanding of material covered  Monitoring Blood Sugars  Instructions for Meter Teaching- Patient instructed in the following:  Site selection and skin preparation, Loading strips and lancet device, meter activation, obtaining blood sample, test strip and lancet disposal and recording log book entries  Patient has good understanding of material covered and was able to test their own blood sugar in office today  Testing frequency: Encouraged pair testing  Test sugars before a meal and 2hr after the same meal, rotating between breakfast, lunch, and dinner  Test sugars twice a day (3 days a week)       Goal Blood Sugars:   Premeal , even better <110  2hr after a meal <180, even better <140  A1C <7%, even better <6 5%  Hypoglycemia: Instructed patient on definition/risk of hypoglycemia, treatment, causes/symptoms, when to notify provider of lows, prevention of hypoglycemia and exercise precautions  Comments: Angelica verbalizes understanding of hypoglycemia concepts      Physical Activity: Discussed benefits of physical activity to optimize blood glucose control, encouraged activity at patient is physically able  Always consult a physician prior to starting an exercise program   Comments: Angelica verbalizes understanding of hypoglycemia concepts        Diabetes Education Record  Angelica received the following handouts: signs and symptoms of hypoglycemia and hyperglycemia, 15/15 rule, Nutrition Guidelines for Diabetes, Know Your Blood Glucose Numbers, [facility], Diabetes Zone Tool, 2021 Living Well with Diabetes Class Schedule  Patient response to instruction    Comprehensiongood  Motivationgood  Expected Compliancegood    Thank you for referring your patient to [facility], it was a pleasure working with them today  Please feel free to call with any questions or concerns      Start: 3:27 pm  Stop: 4:29 pm  Referred by: Jackie Garcia MD    [facility], [address] [place] [address]  [place] [id number]

## 2021-04-30 NOTE — PATIENT INSTRUCTIONS
Class Assessment AVS    You are scheduled to virtually attend Living Well with Diabetes Classes starting: May 5, 2021 at 6:00 pm   Please bring a copy of your blood sugar log and pen with you to class  Testing frequency: Recommend pair testing (before a meal and 2 hours after the same meal) rotating a different meal each day    Goal Blood Sugars:   Premeal , even better <110  2hr after a meal <180, even better <140  A1C <7%, even better <6 5%  Thank you for coming to the [facility] for education today  Please feel free to call with any questions or concerns      Akiko Tinoco, [address]  [facility] [address]

## 2021-05-05 ENCOUNTER — TELEMEDICINE (OUTPATIENT)
Dept: DIABETES SERVICES | Facility: HOSPITAL | Age: 36
End: 2021-05-05
Payer: COMMERCIAL

## 2021-05-05 DIAGNOSIS — E11.9 TYPE 2 DIABETES MELLITUS WITHOUT COMPLICATION, WITHOUT LONG-TERM CURRENT USE OF INSULIN (HCC): Primary | ICD-10-CM

## 2021-05-05 PROCEDURE — G0109 DIAB MANAGE TRN IND/GROUP: HCPCS | Performed by: DIETITIAN, REGISTERED

## 2021-05-06 NOTE — PROGRESS NOTES
Virtual Regular Visit      Assessment/Plan:    Problem List Items Addressed This Visit     None               Reason for visit is   Chief Complaint   Patient presents with    Virtual Regular Visit        Encounter provider Juan Rojas RD    Provider located at [place] [address] [address] [place] [address]      Recent Visits  No visits were found meeting these conditions  Showing recent visits within past 7 days and meeting all other requirements     Future Appointments  No visits were found meeting these conditions  Showing future appointments within next 150 days and meeting all other requirements        The patient was identified by name and date of birth  Maribeth Orourke was informed that this is a telemedicine visit and that the visit is being conducted through [employer] and patient was informed that this is a secure, HIPAA-compliant platform  She agrees to proceed     My office door was closed  No one else was in the room  She acknowledged consent and understanding of privacy and security of the video platform  The patient has agreed to participate and understands they can discontinue the visit at any time  Patient is aware this is a billable service  Living Well with Diabetes Group Class #1    Angelica Nolen attended the Living Well with Diabetes Group Class #1  Topics Covered in class today include: What is diabetes; Types of Diabetes; How Diabetes is diagnosed; Management skills; the role of exercise in blood sugar managements, Home glucose monitoring, and target ranges  Angelica participated in group activities    The patient's history was reviewed and updated as appropriate: allergies, current medications      Lab Results   Component Value Date    HGBA1C 9 9 (A) 02/23/2021       Diabetes Education Record  Angelica was provided Living Well with Diabetes Class #1 book- sent via email      Patient response to instruction    Comprehensiongood  Motivationgood  Expected Compliancegood    Begin Time: 6pm  End Time: 8pm  Referring Provider: Shabbir Justice    Thank you for referring your patient to [facility], it was a pleasure working with them today  Please feel free to call with any questions or concerns  James Tonja, RD  [address] [facility]  [address]  [place] [address]      Subjective  Angelica Gladys Donahue is a 28 y o  female see notes above   HPI     Past Medical History:   Diagnosis Date    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[facility] )     Epistaxis     Factor V Leiden mutation ([facility] )     Herpes zoster     Hypotension     Menorrhagia     Morbid obesity with BMI of 45 0-49 9, adult ([facility] ) 8/5/2019       Past Surgical History:   Procedure Laterality Date    DILATION AND CURETTAGE OF UTERUS      VENA CAVA FILTER PLACEMENT      INTERRUPTION INFERIOR, GREENFIRELD FILTER PLACEMENT       Current Outpatient Medications   Medication Sig Dispense Refill    Blood Glucose Monitoring Suppl MISC test one time daily      cetirizine (ZyrTEC) 10 mg tablet Take 1 tablet (10 mg total) by mouth daily 30 tablet 2    clobetasol (TEMOVATE) 0 05 % ointment Apply topically 2 (two) times a day For max of 2 weeks at a time   45 g 0    Continuous Blood Gluc  (FREESTYLE ROHINI 14 DAY READER) GRISEL Reading daily 1 Device 1    Continuous Blood Gluc Sensor (FreeStyle Rohini 14 Day Sensor) MISC USE TO TEST DAILY 100 each 5    Dulaglutide 0 75 MG/0 5ML SOPN Inject 0 5 mL (0 75 mg total) under the skin once a week (Patient not taking: Reported on 4/30/2021) 12 pen 0    famotidine (PEPCID) 40 MG tablet TAKE 1 TABLET BY MOUTH DAILY AT BEDTIME 90 tablet 3    FLUoxetine (PROzac) 20 mg capsule TAKE 1 CAPSULE BY MOUTH EVERY DAY 30 capsule 0    glucose blood test strip test one time daily      hydrOXYzine HCL (ATARAX) 25 mg tablet TAKE 1 TABLET (25 MG TOTAL) BY MOUTH EVERY 6 (SIX) HOURS AS NEEDED FOR ANXIETY 20 tablet 0    Lancets MISC test one time daily      loperamide (IMODIUM) 2 mg capsule Take 1 capsule (2 mg total) by mouth 4 (four) times a day as needed for diarrhea (Patient not taking: Reported on 4/14/2021) 30 capsule 0    metFORMIN (GLUCOPHAGE) 1000 MG tablet Take 1 tablet (1,000 mg total) by mouth 2 (two) times a day with meals      montelukast (SINGULAIR) 10 mg tablet Take 1 tablet (10 mg total) by mouth daily at bedtime 90 tablet 3    ondansetron (ZOFRAN) 4 mg tablet Take 1 tablet (4 mg total) by mouth every 8 (eight) hours as needed for nausea or vomiting (Patient not taking: Reported on 4/14/2021) 20 tablet 0    sitaGLIPtin (JANUVIA) 50 mg tablet Take 1 tablet (50 mg total) by mouth daily 90 tablet 3    triamcinolone (KENALOG) 0 1 % ointment APPLY TO AFFECTED AREA TWICE A DAY 30 g 0    warfarin (COUMADIN) 10 mg tablet TAKE 1 TABLET BY MOUTH EVERY DAY 30 tablet 0     No current facility-administered medications for this visit  Allergies   Allergen Reactions    No Known Allergies     Other      seasonal       Review of Systems    Video Exam    There were no vitals filed for this visit  Physical Exam     I spent 120 minutes with patient today in which greater than 50% of the time was spent in counseling/coordination of care regarding diabetes      VIRTUAL VISIT DISCLAIMER    Angelica Juares acknowledges that she has consented to an online visit or consultation  She understands that the online visit is based solely on information provided by her, and that, in the absence of a face-to-face physical evaluation by the physician, the diagnosis she receives is both limited and provisional in terms of accuracy and completeness  This is not intended to replace a full medical face-to-face evaluation by the physician  Angelica Juares understands and accepts these terms

## 2021-05-12 ENCOUNTER — TELEMEDICINE (OUTPATIENT)
Dept: DIABETES SERVICES | Facility: HOSPITAL | Age: 36
End: 2021-05-12
Payer: COMMERCIAL

## 2021-05-12 DIAGNOSIS — E11.9 TYPE 2 DIABETES MELLITUS WITHOUT COMPLICATION, WITHOUT LONG-TERM CURRENT USE OF INSULIN (HCC): ICD-10-CM

## 2021-05-12 PROCEDURE — G0109 DIAB MANAGE TRN IND/GROUP: HCPCS | Performed by: DIETITIAN, REGISTERED

## 2021-05-13 NOTE — PROGRESS NOTES
Virtual Regular Visit      Assessment/Plan:    Problem List Items Addressed This Visit     None               Reason for visit is   Chief Complaint   Patient presents with    Virtual Regular Visit        Encounter provider Rogers Gibson RD    Provider located at [place] [address] [place] [address]      Recent Visits  No visits were found meeting these conditions  Showing recent visits within past 7 days and meeting all other requirements     Future Appointments  No visits were found meeting these conditions  Showing future appointments within next 150 days and meeting all other requirements        The patient was identified by name and date of birth  David Forbes was informed that this is a telemedicine visit and that the visit is being conducted through [employer] and patient was informed that this is a secure, HIPAA-compliant platform  She agrees to proceed     My office door was closed  No one else was in the room  She acknowledged consent and understanding of privacy and security of the video platform  The patient has agreed to participate and understands they can discontinue the visit at any time  Patient is aware this is a billable service  Living Well with Diabetes Group Class #2    Angelica Adam attended the Living Well with Diabetes Group Class #2  During class, Angelica was instructed on the following topics: Macronutrients, Carbohydrate sources, What one serving of carbohydrate equals, effects of diet on blood glucose levels, effect of carbohydrates on blood glucose levels, basics of meal planning: balance, portions, meal times, measurements, reading food labels to determine carbohydrates  Angelica participated in group activities of reading labels together and completing the meal planning activity  Angelica will follow up with class #3  Will call with any questions or concerns prior to next session  The patient's history was reviewed and updated as appropriate: allergies, current medications  Lab Results   Component Value Date    HGBA1C 9 9 (A) 02/23/2021       Diabetes Education Record  Angelica was provided Living Well with Diabetes Class #2 book- see notes above      Patient response to instruction    Comprehensiongood  Motivationgood  Expected Compliancegood    Begin Time: 6pm  End Time: 8pm  Referring Provider: Yennifer Aguila    Thank you for referring your patient to [facility], it was a pleasure working with them today  Please feel free to call with any questions or concerns  Vale Dance, RD  [address] [facility] [address]  [place] [facility] [address]      Subjective  Angelica Moseley is a 28 y o  female see notes above   HPI     Past Medical History:   Diagnosis Date    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[facility] )     Epistaxis     Factor V Leiden mutation ([facility] )     Herpes zoster     Hypotension     Menorrhagia     Morbid obesity with BMI of 45 0-49 9, adult ([facility] ) 8/5/2019       Past Surgical History:   Procedure Laterality Date    DILATION AND CURETTAGE OF UTERUS      VENA CAVA FILTER PLACEMENT      INTERRUPTION INFERIOR, GREENFIRELD FILTER PLACEMENT       Current Outpatient Medications   Medication Sig Dispense Refill    Blood Glucose Monitoring Suppl MISC test one time daily      cetirizine (ZyrTEC) 10 mg tablet Take 1 tablet (10 mg total) by mouth daily 30 tablet 2    clobetasol (TEMOVATE) 0 05 % ointment Apply topically 2 (two) times a day For max of 2 weeks at a time   45 g 0    Continuous Blood Gluc  (FREESTYLE ROHINI 14 DAY READER) GRISEL Reading daily 1 Device 1    Continuous Blood Gluc Sensor (FreeStyle Rohini 14 Day Sensor) MISC USE TO TEST DAILY 100 each 5    Dulaglutide 0 75 MG/0 5ML SOPN Inject 0 5 mL (0 75 mg total) under the skin once a week (Patient not taking: Reported on 4/30/2021) 12 pen 0    famotidine (PEPCID) 40 MG tablet TAKE 1 TABLET BY MOUTH DAILY AT BEDTIME 90 tablet 3    FLUoxetine (PROzac) 20 mg capsule TAKE 1 CAPSULE BY MOUTH EVERY DAY 30 capsule 0    glucose blood test strip test one time daily      hydrOXYzine HCL (ATARAX) 25 mg tablet TAKE 1 TABLET (25 MG TOTAL) BY MOUTH EVERY 6 (SIX) HOURS AS NEEDED FOR ANXIETY 20 tablet 0    Lancets MISC test one time daily      loperamide (IMODIUM) 2 mg capsule Take 1 capsule (2 mg total) by mouth 4 (four) times a day as needed for diarrhea (Patient not taking: Reported on 4/14/2021) 30 capsule 0    metFORMIN (GLUCOPHAGE) 1000 MG tablet Take 1 tablet (1,000 mg total) by mouth 2 (two) times a day with meals      montelukast (SINGULAIR) 10 mg tablet Take 1 tablet (10 mg total) by mouth daily at bedtime 90 tablet 3    ondansetron (ZOFRAN) 4 mg tablet Take 1 tablet (4 mg total) by mouth every 8 (eight) hours as needed for nausea or vomiting (Patient not taking: Reported on 4/14/2021) 20 tablet 0    sitaGLIPtin (JANUVIA) 50 mg tablet Take 1 tablet (50 mg total) by mouth daily 90 tablet 3    triamcinolone (KENALOG) 0 1 % ointment APPLY TO AFFECTED AREA TWICE A DAY 30 g 0    warfarin (COUMADIN) 10 mg tablet TAKE 1 TABLET BY MOUTH EVERY DAY 30 tablet 0     No current facility-administered medications for this visit  Allergies   Allergen Reactions    No Known Allergies     Other      seasonal       Review of Systems    Video Exam    There were no vitals filed for this visit  Physical Exam     I spent 120 minutes with patient today in which greater than 50% of the time was spent in counseling/coordination of care regarding diabetes      VIRTUAL VISIT DISCLAIMER    Angelica Fontana acknowledges that she has consented to an online visit or consultation  She understands that the online visit is based solely on information provided by her, and that, in the absence of a face-to-face physical evaluation by the physician, the diagnosis she receives is both limited and provisional in terms of accuracy and completeness  This is not intended to replace a full medical face-to-face evaluation by the physician  Angelica Jiménez understands and accepts these terms

## 2021-05-19 ENCOUNTER — TELEMEDICINE (OUTPATIENT)
Dept: DIABETES SERVICES | Facility: HOSPITAL | Age: 36
End: 2021-05-19
Payer: COMMERCIAL

## 2021-05-19 DIAGNOSIS — E11.9 TYPE 2 DIABETES MELLITUS WITHOUT COMPLICATION, WITHOUT LONG-TERM CURRENT USE OF INSULIN (HCC): Primary | ICD-10-CM

## 2021-05-19 PROCEDURE — G0109 DIAB MANAGE TRN IND/GROUP: HCPCS | Performed by: DIETITIAN, REGISTERED

## 2021-05-20 NOTE — PROGRESS NOTES
Virtual Regular Visit      Assessment/Plan:    Problem List Items Addressed This Visit     None               Reason for visit is   Chief Complaint   Patient presents with    Virtual Regular Visit        Encounter provider Lachelle Rowell RD    Provider located at [place] [address] [address] [place] [address]      Recent Visits  No visits were found meeting these conditions  Showing recent visits within past 7 days and meeting all other requirements     Future Appointments  No visits were found meeting these conditions  Showing future appointments within next 150 days and meeting all other requirements        The patient was identified by name and date of birth  Savi Combs was informed that this is a telemedicine visit and that the visit is being conducted through [employer] and patient was informed that this is a secure, HIPAA-compliant platform  She agrees to proceed     My office door was closed  No one else was in the room  She acknowledged consent and understanding of privacy and security of the video platform  The patient has agreed to participate and understands they can discontinue the visit at any time  Patient is aware this is a billable service  Living Well with Diabetes Group Class #3    Angelica Shukla attended the Living Well with Diabetes Group Class #3  During class, Angelica was instructed on the following topics: Oral and injectable medications, short term complications of diabetes, long term complications of diabetes, prevention of complications, foot care, sick day management, stress management, and traveling with diabetes  Angelica participated in group activities  Angelica will follow up with class #4  Will call with any questions or concerns prior to next session  The patient's history was reviewed and updated as appropriate: allergies, current medications      Lab Results   Component Value Date HGBA1C 9 9 (A) 02/23/2021       Diabetes Education Record  Angelica was provided Living Well with Diabetes Class #3 book- sent via email      Patient response to instruction    Comprehensiongood  Motivationgood  Expected Compliancegood    Begin Time: 6pm  End Time: 8pm   Referring Provider: Didi Garcia    Thank you for referring your patient to [facility], it was a pleasure working with them today  Please feel free to call with any questions or concerns  Sigrid Powers, RD  [address] [address]      Subjective  Angelica Bethea is a 28 y o  female see notes above   HPI     Past Medical History:   Diagnosis Date    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     Epistaxis     [facility]     Herpes zoster     Hypotension     Menorrhagia     Morbid obesity with BMI of 45 0-49 9, adult ([facility] ) 8/5/2019       Past Surgical History:   Procedure Laterality Date    DILATION AND CURETTAGE OF UTERUS      VENA CAVA FILTER PLACEMENT      INTERRUPTION INFERIOR, GREENFIRELD FILTER PLACEMENT       Current Outpatient Medications   Medication Sig Dispense Refill    Blood Glucose Monitoring Suppl MISC test one time daily      cetirizine (ZyrTEC) 10 mg tablet Take 1 tablet (10 mg total) by mouth daily 30 tablet 2    clobetasol (TEMOVATE) 0 05 % ointment Apply topically 2 (two) times a day For max of 2 weeks at a time   45 g 0    Continuous Blood Gluc  (FREESTYLE ROHINI 14 DAY READER) GRISEL Reading daily 1 Device 1    Continuous Blood Gluc Sensor (FreeStyle Rohini 14 Day Sensor) MISC USE TO TEST DAILY 100 each 5    Dulaglutide 0 75 MG/0 5ML SOPN Inject 0 5 mL (0 75 mg total) under the skin once a week (Patient not taking: Reported on 4/30/2021) 12 pen 0    famotidine (PEPCID) 40 MG tablet TAKE 1 TABLET BY MOUTH DAILY AT BEDTIME 90 tablet 3    FLUoxetine (PROzac) 20 mg capsule TAKE 1 CAPSULE BY MOUTH EVERY DAY 30 capsule 0    glucose blood test strip test one time daily      hydrOXYzine HCL (ATARAX) 25 mg tablet TAKE 1 TABLET (25 MG TOTAL) BY MOUTH EVERY 6 (SIX) HOURS AS NEEDED FOR ANXIETY 20 tablet 0    Lancets MISC test one time daily      loperamide (IMODIUM) 2 mg capsule Take 1 capsule (2 mg total) by mouth 4 (four) times a day as needed for diarrhea (Patient not taking: Reported on 4/14/2021) 30 capsule 0    metFORMIN (GLUCOPHAGE) 1000 MG tablet Take 1 tablet (1,000 mg total) by mouth 2 (two) times a day with meals      montelukast (SINGULAIR) 10 mg tablet Take 1 tablet (10 mg total) by mouth daily at bedtime 90 tablet 3    ondansetron (ZOFRAN) 4 mg tablet Take 1 tablet (4 mg total) by mouth every 8 (eight) hours as needed for nausea or vomiting (Patient not taking: Reported on 4/14/2021) 20 tablet 0    sitaGLIPtin (JANUVIA) 50 mg tablet Take 1 tablet (50 mg total) by mouth daily 90 tablet 3    triamcinolone (KENALOG) 0 1 % ointment APPLY TO AFFECTED AREA TWICE A DAY 30 g 0    warfarin (COUMADIN) 10 mg tablet TAKE 1 TABLET BY MOUTH EVERY DAY 30 tablet 0     No current facility-administered medications for this visit  Allergies   Allergen Reactions    No Known Allergies     Other      seasonal       Review of Systems    Video Exam    There were no vitals filed for this visit  Physical Exam     I spent 120 minutes with patient today in which greater than 50% of the time was spent in counseling/coordination of care regarding diabetes      VIRTUAL VISIT DISCLAIMER    Angelica Fontana acknowledges that she has consented to an online visit or consultation  She understands that the online visit is based solely on information provided by her, and that, in the absence of a face-to-face physical evaluation by the physician, the diagnosis she receives is both limited and provisional in terms of accuracy and completeness  This is not intended to replace a full medical face-to-face evaluation by the physician   Angelica Fontana understands and accepts these terms

## 2021-05-24 DIAGNOSIS — I82.4Y9 DEEP VEIN THROMBOSIS (DVT) OF PROXIMAL LOWER EXTREMITY, UNSPECIFIED CHRONICITY, UNSPECIFIED LATERALITY (HCC): ICD-10-CM

## 2021-05-24 DIAGNOSIS — F41.9 ANXIETY: ICD-10-CM

## 2021-05-24 RX ORDER — WARFARIN SODIUM 10 MG/1
TABLET ORAL
Qty: 30 TABLET | Refills: 0 | Status: SHIPPED | OUTPATIENT
Start: 2021-05-24 | End: 2021-06-17

## 2021-05-24 RX ORDER — FLUOXETINE HYDROCHLORIDE 20 MG/1
CAPSULE ORAL
Qty: 30 CAPSULE | Refills: 2 | Status: SHIPPED | OUTPATIENT
Start: 2021-05-24 | End: 2021-08-10 | Stop reason: SDUPTHER

## 2021-05-26 ENCOUNTER — TELEMEDICINE (OUTPATIENT)
Dept: DIABETES SERVICES | Facility: HOSPITAL | Age: 36
End: 2021-05-26
Payer: COMMERCIAL

## 2021-05-26 DIAGNOSIS — E11.9 TYPE 2 DIABETES MELLITUS WITHOUT COMPLICATION, WITHOUT LONG-TERM CURRENT USE OF INSULIN (HCC): Primary | ICD-10-CM

## 2021-05-26 PROCEDURE — G0109 DIAB MANAGE TRN IND/GROUP: HCPCS | Performed by: DIETITIAN, REGISTERED

## 2021-05-27 NOTE — PROGRESS NOTES
Virtual Regular Visit      Assessment/Plan:    Problem List Items Addressed This Visit     None               Reason for visit is   Chief Complaint   Patient presents with    Virtual Regular Visit        Encounter provider Tessie Haney RD    Provider located at [place] [address] [address] [place] [address]      Recent Visits  No visits were found meeting these conditions  Showing recent visits within past 7 days and meeting all other requirements     Future Appointments  No visits were found meeting these conditions  Showing future appointments within next 150 days and meeting all other requirements        The patient was identified by name and date of birth  Orie Dancer was informed that this is a telemedicine visit and that the visit is being conducted through [employer] and patient was informed that this is a secure, HIPAA-compliant platform  She agrees to proceed     My office door was closed  No one else was in the room  She acknowledged consent and understanding of privacy and security of the video platform  The patient has agreed to participate and understands they can discontinue the visit at any time  Patient is aware this is a billable service  Living Well with Diabetes Group Class #4    Angelica Nolen attended the Living Well with Diabetes Group Class #4  During class, Angelica was instructed on the following topics: Types of cholesterol, dietary sources of cholesterol, types of fat, types of fiber, reading food labels- in depth, healthy choices when dining out  Angelica participated in group activities of reading labels together and completed the dining out activity  Angelica will follow up with class #5 or additional DSMT/MNT as desired  Will call with any questions or concerns prior to next session  The patient's history was reviewed and updated as appropriate: allergies, current medications      Lab Results   Component Value Date    HGBA1C 9 9 (A) 02/23/2021       Diabetes Education Record  Angelica was provided Living Well with Diabetes Class #4 book      Patient response to instruction    Comprehensiongood  Motivationgood  Expected Compliancegood    Begin Time: 6pm  End Time: 8pm  Referring Provider: Anthony Slade and Mia    Thank you for referring your patient to [facility], it was a pleasure working with them today  Please feel free to call with any questions or concerns  Leonora Soriano, RD  [address] [facility]  [facility] hospital [address]      Subjective  Angelica Fontana is a 28 y o  female see notes above   HPI     Past Medical History:   Diagnosis Date    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[facility] )     Epistaxis     [facility]     Herpes zoster     Hypotension     Menorrhagia     Morbid obesity with BMI of 45 0-49 9, adult [facility] 8/5/2019       Past Surgical History:   Procedure Laterality Date    DILATION AND CURETTAGE OF UTERUS      VENA CAVA FILTER PLACEMENT      INTERRUPTION INFERIOR, GREENFIRELD FILTER PLACEMENT       Current Outpatient Medications   Medication Sig Dispense Refill    Blood Glucose Monitoring Suppl MISC test one time daily      cetirizine (ZyrTEC) 10 mg tablet Take 1 tablet (10 mg total) by mouth daily 30 tablet 2    clobetasol (TEMOVATE) 0 05 % ointment Apply topically 2 (two) times a day For max of 2 weeks at a time   45 g 0    Continuous Blood Gluc  (FREESTYLE ROHINI 14 DAY READER) GRISEL Reading daily 1 Device 1    Continuous Blood Gluc Sensor (FreeStyle Rohini 14 Day Sensor) MISC USE TO TEST DAILY 100 each 5    Dulaglutide 0 75 MG/0 5ML SOPN Inject 0 5 mL (0 75 mg total) under the skin once a week (Patient not taking: Reported on 4/30/2021) 12 pen 0    famotidine (PEPCID) 40 MG tablet TAKE 1 TABLET BY MOUTH DAILY AT BEDTIME 90 tablet 3    FLUoxetine (PROzac) 20 mg capsule TAKE 1 CAPSULE BY MOUTH EVERY DAY 30 capsule 2    glucose blood test strip test one time daily      hydrOXYzine HCL (ATARAX) 25 mg tablet TAKE 1 TABLET (25 MG TOTAL) BY MOUTH EVERY 6 (SIX) HOURS AS NEEDED FOR ANXIETY 20 tablet 0    Lancets MISC test one time daily      loperamide (IMODIUM) 2 mg capsule Take 1 capsule (2 mg total) by mouth 4 (four) times a day as needed for diarrhea (Patient not taking: Reported on 4/14/2021) 30 capsule 0    metFORMIN (GLUCOPHAGE) 1000 MG tablet Take 1 tablet (1,000 mg total) by mouth 2 (two) times a day with meals      montelukast (SINGULAIR) 10 mg tablet Take 1 tablet (10 mg total) by mouth daily at bedtime 90 tablet 3    ondansetron (ZOFRAN) 4 mg tablet Take 1 tablet (4 mg total) by mouth every 8 (eight) hours as needed for nausea or vomiting (Patient not taking: Reported on 4/14/2021) 20 tablet 0    sitaGLIPtin (JANUVIA) 50 mg tablet Take 1 tablet (50 mg total) by mouth daily 90 tablet 3    triamcinolone (KENALOG) 0 1 % ointment APPLY TO AFFECTED AREA TWICE A DAY 30 g 0    warfarin (COUMADIN) 10 mg tablet TAKE 1 TABLET BY MOUTH EVERY DAY 30 tablet 0     No current facility-administered medications for this visit  Allergies   Allergen Reactions    No Known Allergies     Other      seasonal       Review of Systems    Video Exam    There were no vitals filed for this visit  Physical Exam     I spent 120 minutes with patient today in which greater than 50% of the time was spent in counseling/coordination of care regarding diabetes      VIRTUAL VISIT DISCLAIMER    Angelica Troy Margarette acknowledges that she has consented to an online visit or consultation  She understands that the online visit is based solely on information provided by her, and that, in the absence of a face-to-face physical evaluation by the physician, the diagnosis she receives is both limited and provisional in terms of accuracy and completeness  This is not intended to replace a full medical face-to-face evaluation by the physician   Candice Lee understands and accepts these terms

## 2021-06-09 DIAGNOSIS — I82.5Y9 CHRONIC DEEP VEIN THROMBOSIS (DVT) OF PROXIMAL VEIN OF LOWER EXTREMITY, UNSPECIFIED LATERALITY (HCC): ICD-10-CM

## 2021-06-09 DIAGNOSIS — E11.9 TYPE 2 DIABETES MELLITUS WITHOUT COMPLICATION, WITHOUT LONG-TERM CURRENT USE OF INSULIN (HCC): ICD-10-CM

## 2021-06-17 ENCOUNTER — ANNUAL EXAM (OUTPATIENT)
Dept: OBGYN CLINIC | Facility: CLINIC | Age: 36
End: 2021-06-17
Payer: COMMERCIAL

## 2021-06-17 VITALS
WEIGHT: 266 LBS | HEIGHT: 65 IN | SYSTOLIC BLOOD PRESSURE: 120 MMHG | DIASTOLIC BLOOD PRESSURE: 78 MMHG | BODY MASS INDEX: 44.32 KG/M2

## 2021-06-17 DIAGNOSIS — R10.2 PELVIC PAIN: ICD-10-CM

## 2021-06-17 DIAGNOSIS — B37.2 SKIN YEAST INFECTION: ICD-10-CM

## 2021-06-17 DIAGNOSIS — Z97.5 IUD (INTRAUTERINE DEVICE) IN PLACE: ICD-10-CM

## 2021-06-17 DIAGNOSIS — I82.4Y9 DEEP VEIN THROMBOSIS (DVT) OF PROXIMAL LOWER EXTREMITY, UNSPECIFIED CHRONICITY, UNSPECIFIED LATERALITY (HCC): ICD-10-CM

## 2021-06-17 DIAGNOSIS — Z01.419 GYNECOLOGIC EXAM NORMAL: Primary | ICD-10-CM

## 2021-06-17 PROCEDURE — 99395 PREV VISIT EST AGE 18-39: CPT | Performed by: PHYSICIAN ASSISTANT

## 2021-06-17 PROCEDURE — 3008F BODY MASS INDEX DOCD: CPT | Performed by: INTERNAL MEDICINE

## 2021-06-17 RX ORDER — WARFARIN SODIUM 10 MG/1
TABLET ORAL
Qty: 30 TABLET | Refills: 0 | Status: SHIPPED | OUTPATIENT
Start: 2021-06-17 | End: 2021-07-12

## 2021-06-17 RX ORDER — NYSTATIN AND TRIAMCINOLONE ACETONIDE 100000; 1 [USP'U]/G; MG/G
OINTMENT TOPICAL 2 TIMES DAILY
Qty: 30 G | Refills: 0 | Status: SHIPPED | OUTPATIENT
Start: 2021-06-17 | End: 2021-08-10

## 2021-06-17 NOTE — PROGRESS NOTES
Assessment/Plan:    Gynecologic exam normal  Pap guidelines reviewed  Pap deferred secondary to negative pap and HPV in 2018 in this low risk patient  Reviewed irregular light bleeding common on Mirena IUD  With no pelvic pain will plan ultrasound to further evaluate  Script given  Reviewed skin yeast infection  Script for Nystatin/triamcinolone ointment given  Aware of recommendation to keep area dry  Return to office for annual or as needed  Problem List Items Addressed This Visit        Other    Gynecologic exam normal - Primary     Pap guidelines reviewed  Pap deferred secondary to negative pap and HPV in 2018 in this low risk patient  Reviewed irregular light bleeding common on Mirena IUD  With no pelvic pain will plan ultrasound to further evaluate  Script given  Reviewed skin yeast infection  Script for Nystatin/triamcinolone ointment given  Aware of recommendation to keep area dry  Return to office for annual or as needed  Other Visit Diagnoses     Skin yeast infection        Relevant Medications    nystatin-triamcinolone (MYCOLOG-II) ointment    Pelvic pain        Relevant Orders    US pelvis complete w transvaginal    IUD (intrauterine device) in place        Relevant Orders    US pelvis complete w transvaginal            Subjective:      Patient ID: Nilda Murphy is a 28 y o  female  HPI  29 yo seen for annual exam  Menses irregular, light  Has Mirena IUD 9/21/2018  Hx of factor V leiden on coumadin secondary to PE, DVT  Reports pelvic pain more recently, comes and goes  Denies vaginal discharge, odor, itching  Denies bowel or bladder issues  Also reports vulvar itching and irritation  Hx of diabetes on Metformin and Januvia  Last pap: 8/31/2018 NILM (-)HRHPV     The following portions of the patient's history were reviewed and updated as appropriate:   She  has a past medical history of 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[facility] [address] ), Epistaxis, Factor V Leiden mutation ([facility] [address] ), Herpes zoster, Hypotension, Menorrhagia, and Morbid obesity with BMI of 45 0-49 9, adult ([place] [address] ) (8/5/2019)  She   Patient Active Problem List    Diagnosis Date Noted    Sprain of unspecified ligament of right ankle, initial encounter 08/05/2019    Morbid obesity with BMI of 45 0-49 9, adult ([place] [address] ) 08/05/2019    Rash 06/11/2019    Encounter for IUD insertion 09/21/2018    Gynecologic exam normal 08/31/2018    Vulvar itching 08/31/2018    Type 2 diabetes mellitus ([place] [address] ) 04/30/2015    Hyperlipidemia 04/30/2015    Anxiety state 04/07/2015    Deep vein thrombosis (DVT) ([place] [address] ) 12/12/2007    Factor V Leiden mutation ([place] [address] ) 12/12/2007    Pulmonary embolism ([place] [address] ) 12/12/2007     She  has a past surgical history that includes Vena cava filter placement and Dilation and curettage of uterus  Her family history includes Cancer in her family; Diabetes in her father; Factor V Leiden deficiency in her family; Heart disease in her family; Hypercoagulant Ability in her family; Stroke in her family  She  reports that she quit smoking about 3 years ago  She quit smokeless tobacco use about 3 years ago  She reports current alcohol use  She reports that she does not use drugs    Current Outpatient Medications   Medication Sig Dispense Refill    Blood Glucose Monitoring Suppl MISC test one time daily      cetirizine (ZyrTEC) 10 mg tablet Take 1 tablet (10 mg total) by mouth daily 30 tablet 2    Continuous Blood Gluc  (FREESTYLE JOHN 14 DAY READER) GRISEL Reading daily 1 Device 1    Continuous Blood Gluc Sensor (FreeStyle John 14 Day Sensor) MISC USE TO TEST DAILY 100 each 5    famotidine (PEPCID) 40 MG tablet TAKE 1 TABLET BY MOUTH DAILY AT BEDTIME 90 tablet 3    FLUoxetine (PROzac) 20 mg capsule TAKE 1 CAPSULE BY MOUTH EVERY DAY 30 capsule 2    glucose blood test strip test one time daily      hydrOXYzine HCL (ATARAX) 25 mg tablet TAKE 1 TABLET (25 MG TOTAL) BY MOUTH EVERY 6 (SIX) HOURS AS NEEDED FOR ANXIETY 20 tablet 0    Lancets MISC test one time daily      metFORMIN (GLUCOPHAGE) 1000 MG tablet Take 1 tablet (1,000 mg total) by mouth 2 (two) times a day with meals 120 tablet 2    montelukast (SINGULAIR) 10 mg tablet Take 1 tablet (10 mg total) by mouth daily at bedtime 90 tablet 3    sitaGLIPtin (JANUVIA) 50 mg tablet Take 1 tablet (50 mg total) by mouth daily 90 tablet 3    nystatin-triamcinolone (MYCOLOG-II) ointment Apply topically 2 (two) times a day 30 g 0    warfarin (COUMADIN) 10 mg tablet TAKE 1 TABLET BY MOUTH EVERY DAY 30 tablet 0     No current facility-administered medications for this visit  She is allergic to no known allergies and other       Review of Systems   Constitutional: Negative for fatigue, fever and unexpected weight change  HENT: Negative for dental problem and sinus pressure  Eyes: Negative for visual disturbance  Respiratory: Negative for cough, shortness of breath and wheezing  Cardiovascular: Negative for chest pain  Gastrointestinal: Negative for abdominal pain, blood in stool, constipation, diarrhea, nausea and vomiting  Endocrine: Negative for polydipsia  Genitourinary: Positive for pelvic pain  Negative for difficulty urinating, dyspareunia, dysuria, frequency, hematuria and urgency  Musculoskeletal: Negative for arthralgias and back pain  Neurological: Negative for dizziness, seizures, light-headedness and headaches  Psychiatric/Behavioral: Negative for suicidal ideas  The patient is not nervous/anxious  Objective:      /78 (BP Location: Left arm, Patient Position: Sitting, Cuff Size: Large)   Ht 5' 5" (1 651 m)   Wt 121 kg (266 lb)   LMP  (LMP Unknown)   BMI 44 26 kg/m²          Physical Exam  Constitutional:       Appearance: Normal appearance  She is well-developed  Neck:      Thyroid: No thyroid mass or thyromegaly  Cardiovascular:      Rate and Rhythm: Normal rate and regular rhythm  Heart sounds: Normal heart sounds   No murmur heard  No friction rub  No gallop  Pulmonary:      Effort: Pulmonary effort is normal  No respiratory distress  Breath sounds: Normal breath sounds  No wheezing or rales  Chest:      Breasts:         Right: Normal  No swelling, bleeding, inverted nipple, mass, nipple discharge, skin change or tenderness  Left: Normal  No swelling, bleeding, inverted nipple, mass, nipple discharge, skin change or tenderness  Abdominal:      General: Bowel sounds are normal  There is no distension  Palpations: Abdomen is soft  There is no mass  Tenderness: There is no abdominal tenderness  There is no guarding or rebound  Genitourinary:     Labia:         Right: Rash present  No tenderness, lesion or injury  Left: Rash present  No tenderness, lesion or injury  Urethra: No prolapse, urethral pain, urethral swelling or urethral lesion  Vagina: No signs of injury  No vaginal discharge, erythema, tenderness or bleeding  Cervix: No cervical motion tenderness, discharge or friability  Uterus: Not deviated, not enlarged and not tender  Adnexa:         Right: No mass, tenderness or fullness  Left: No mass, tenderness or fullness  Comments: IUD strings present at cervical os  Musculoskeletal:      Cervical back: Neck supple  Lymphadenopathy:      Cervical: No cervical adenopathy  Upper Body:      Right upper body: No supraclavicular or axillary adenopathy  Left upper body: No supraclavicular or axillary adenopathy  Skin:     General: Skin is warm and dry  Coloration: Skin is not pale  Findings: No erythema or rash  Neurological:      Mental Status: She is alert and oriented to person, place, and time  Psychiatric:         Behavior: Behavior normal          Thought Content:  Thought content normal          Judgment: Judgment normal

## 2021-06-20 NOTE — ASSESSMENT & PLAN NOTE
Pap guidelines reviewed  Pap deferred secondary to negative pap and HPV in 2018 in this low risk patient  Reviewed irregular light bleeding common on Mirena IUD  With no pelvic pain will plan ultrasound to further evaluate  Script given  Reviewed skin yeast infection  Script for Nystatin/triamcinolone ointment given  Aware of recommendation to keep area dry  Return to office for annual or as needed

## 2021-06-22 ENCOUNTER — TELEPHONE (OUTPATIENT)
Dept: FAMILY MEDICINE CLINIC | Facility: CLINIC | Age: 36
End: 2021-06-22

## 2021-06-22 NOTE — TELEPHONE ENCOUNTER
Patient calling, she has new insurance, patient states it was previously discussed to start trulicity but it was very expensive  Patient wondering if she can have it sent to [facility] to find out if her new insurance will cover it  Please advise

## 2021-06-22 NOTE — TELEPHONE ENCOUNTER
It looks like in the record that she is seeing endocrinology    If so they should be deciding and she should discuss with them

## 2021-06-23 NOTE — TELEPHONE ENCOUNTER
Patient insurance changed and now is asking for the trulicity pen called into [facility] [place]    Last seen 4- and fu 7-  Asking to be called at [phone]  Thank you

## 2021-06-29 ENCOUNTER — PATIENT MESSAGE (OUTPATIENT)
Dept: ENDOCRINOLOGY | Facility: CLINIC | Age: 36
End: 2021-06-29

## 2021-06-30 ENCOUNTER — TELEPHONE (OUTPATIENT)
Dept: ENDOCRINOLOGY | Facility: CLINIC | Age: 36
End: 2021-06-30

## 2021-06-30 ENCOUNTER — HOSPITAL ENCOUNTER (OUTPATIENT)
Dept: RADIOLOGY | Age: 36
Discharge: HOME | End: 2021-06-30
Payer: COMMERCIAL

## 2021-06-30 DIAGNOSIS — E11.9 TYPE 2 DIABETES MELLITUS WITHOUT COMPLICATION, WITHOUT LONG-TERM CURRENT USE OF INSULIN (HCC): Primary | ICD-10-CM

## 2021-06-30 DIAGNOSIS — Z97.5 IUD (INTRAUTERINE DEVICE) IN PLACE: ICD-10-CM

## 2021-06-30 DIAGNOSIS — R10.2 PELVIC PAIN: ICD-10-CM

## 2021-06-30 PROCEDURE — 76830 TRANSVAGINAL US NON-OB: CPT

## 2021-06-30 PROCEDURE — 76856 US EXAM PELVIC COMPLETE: CPT

## 2021-06-30 NOTE — TELEPHONE ENCOUNTER
Spoke to patient to inform her  Prior Authorizaton has been started  [facility]

## 2021-07-01 ENCOUNTER — TELEPHONE (OUTPATIENT)
Dept: ENDOCRINOLOGY | Facility: CLINIC | Age: 36
End: 2021-07-01

## 2021-07-01 NOTE — TELEPHONE ENCOUNTER
This request has been approved using information available on the patient's profile  NDCWLN:[id number]; Review Type:Prior Auth; Coverage Start Date:2021; Coverage End IZ;  Drug  Trulicity 4 25PP/4 6CT pen-injectors  Form  [employer] Electronic PA Form ( [place])  Original Claim Info  76      PA approved for Trulicity

## 2021-07-05 DIAGNOSIS — I82.5Y9 CHRONIC DEEP VEIN THROMBOSIS (DVT) OF PROXIMAL VEIN OF LOWER EXTREMITY, UNSPECIFIED LATERALITY (HCC): ICD-10-CM

## 2021-07-05 DIAGNOSIS — E11.9 TYPE 2 DIABETES MELLITUS WITHOUT COMPLICATION, WITHOUT LONG-TERM CURRENT USE OF INSULIN (HCC): ICD-10-CM

## 2021-07-11 ENCOUNTER — TELEPHONE (OUTPATIENT)
Dept: FAMILY MEDICINE CLINIC | Facility: CLINIC | Age: 36
End: 2021-07-11

## 2021-07-11 DIAGNOSIS — I82.4Y9 DEEP VEIN THROMBOSIS (DVT) OF PROXIMAL LOWER EXTREMITY, UNSPECIFIED CHRONICITY, UNSPECIFIED LATERALITY (HCC): ICD-10-CM

## 2021-07-12 RX ORDER — WARFARIN SODIUM 10 MG/1
TABLET ORAL
Qty: 30 TABLET | Refills: 0 | Status: SHIPPED | OUTPATIENT
Start: 2021-07-12 | End: 2021-08-16

## 2021-08-10 ENCOUNTER — OFFICE VISIT (OUTPATIENT)
Dept: FAMILY MEDICINE CLINIC | Facility: CLINIC | Age: 36
End: 2021-08-10
Payer: COMMERCIAL

## 2021-08-10 ENCOUNTER — APPOINTMENT (OUTPATIENT)
Dept: URGENT CARE | Facility: CLINIC | Age: 36
End: 2021-08-10

## 2021-08-10 ENCOUNTER — TELEPHONE (OUTPATIENT)
Dept: URGENT CARE | Facility: CLINIC | Age: 36
End: 2021-08-10

## 2021-08-10 VITALS
DIASTOLIC BLOOD PRESSURE: 68 MMHG | WEIGHT: 267 LBS | RESPIRATION RATE: 18 BRPM | HEART RATE: 85 BPM | BODY MASS INDEX: 44.43 KG/M2 | SYSTOLIC BLOOD PRESSURE: 128 MMHG | OXYGEN SATURATION: 96 % | TEMPERATURE: 97.4 F

## 2021-08-10 DIAGNOSIS — B37.2 SKIN YEAST INFECTION: ICD-10-CM

## 2021-08-10 DIAGNOSIS — E11.9 TYPE 2 DIABETES MELLITUS WITHOUT COMPLICATION, WITHOUT LONG-TERM CURRENT USE OF INSULIN (HCC): Primary | ICD-10-CM

## 2021-08-10 DIAGNOSIS — E78.5 HYPERLIPIDEMIA, UNSPECIFIED HYPERLIPIDEMIA TYPE: ICD-10-CM

## 2021-08-10 DIAGNOSIS — Z00.00 PHYSICAL EXAM: ICD-10-CM

## 2021-08-10 DIAGNOSIS — I27.82 CHRONIC PULMONARY EMBOLISM WITHOUT ACUTE COR PULMONALE, UNSPECIFIED PULMONARY EMBOLISM TYPE (HCC): ICD-10-CM

## 2021-08-10 DIAGNOSIS — I82.4Y9 DEEP VEIN THROMBOSIS (DVT) OF PROXIMAL LOWER EXTREMITY, UNSPECIFIED CHRONICITY, UNSPECIFIED LATERALITY (HCC): ICD-10-CM

## 2021-08-10 DIAGNOSIS — Z11.59 ENCOUNTER FOR HEPATITIS C SCREENING TEST FOR LOW RISK PATIENT: ICD-10-CM

## 2021-08-10 DIAGNOSIS — F41.9 ANXIETY: ICD-10-CM

## 2021-08-10 LAB — RUBV IGG SERPL IA-ACNC: 99.1 IU/ML

## 2021-08-10 PROCEDURE — 99214 OFFICE O/P EST MOD 30 MIN: CPT | Performed by: NURSE PRACTITIONER

## 2021-08-10 PROCEDURE — 86765 RUBEOLA ANTIBODY: CPT

## 2021-08-10 PROCEDURE — 86480 TB TEST CELL IMMUN MEASURE: CPT

## 2021-08-10 PROCEDURE — 86787 VARICELLA-ZOSTER ANTIBODY: CPT

## 2021-08-10 PROCEDURE — 86762 RUBELLA ANTIBODY: CPT

## 2021-08-10 PROCEDURE — 86735 MUMPS ANTIBODY: CPT

## 2021-08-10 RX ORDER — NYSTATIN AND TRIAMCINOLONE ACETONIDE 100000; 1 [USP'U]/G; MG/G
OINTMENT TOPICAL
Qty: 30 G | Refills: 0 | Status: SHIPPED | OUTPATIENT
Start: 2021-08-10

## 2021-08-10 RX ORDER — FLUOXETINE HYDROCHLORIDE 20 MG/1
20 CAPSULE ORAL DAILY
Qty: 90 CAPSULE | Refills: 2 | Status: SHIPPED | OUTPATIENT
Start: 2021-08-10 | End: 2021-09-14 | Stop reason: SDUPTHER

## 2021-08-10 NOTE — PROGRESS NOTES
Assessment/Plan:         Diagnoses and all orders for this visit:    Type 2 diabetes mellitus without complication, without long-term current use of insulin (HCC)  Seeing endocrine    Will get labs   Anxiety  -     FLUoxetine (PROzac) 20 mg capsule; Take 1 capsule (20 mg total) by mouth daily    Chronic pulmonary embolism without acute cor pulmonale, unspecified pulmonary embolism type ([facility] )  -     Protime-INR; Future    Deep vein thrombosis (DVT) of proximal lower extremity, unspecified chronicity, unspecified laterality ([facility] [address] )  -     Protime-INR; Future    Hyperlipidemia, unspecified hyperlipidemia type  -     Lipid Panel with Direct LDL reflex; Future    Encounter for hepatitis C screening test for low risk patient  -     Hepatitis C antibody; Future          Subjective:      Patient ID: James Arevalo is a 39 y o  female  HPI  Here for follow up on DM    Doing ok   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  The following portions of the patient's history were reviewed and updated as appropriate: allergies, current medications, past family history, past medical history, past social history, past surgical history and problem list     Review of Systems   Constitutional: Negative for activity change, appetite change, chills and fever  HENT: Negative for congestion, ear pain, postnasal drip and sore throat  Eyes: Negative for pain and visual disturbance  Respiratory: Negative for cough and shortness of breath  Cardiovascular: Negative for chest pain and palpitations  Gastrointestinal: Negative for abdominal pain and vomiting  Genitourinary: Negative for dysuria, hematuria and urgency  Musculoskeletal: Negative for arthralgias and back pain  Skin: Negative for color change and rash  Neurological: Negative for seizures and syncope  Psychiatric/Behavioral: Negative for confusion  All other systems reviewed and are negative          Objective:  Vitals:    08/10/21 1610 BP: 128/68   BP Location: Left arm   Patient Position: Sitting   Cuff Size: Large   Pulse: 85   Resp: 18   Temp: (!) 97 4 °F (36 3 °C)   TempSrc: Temporal   SpO2: 96%   Weight: 121 kg (267 lb)      Physical Exam  Vitals reviewed  Constitutional:       Appearance: Normal appearance  She is obese  HENT:      Right Ear: Tympanic membrane, ear canal and external ear normal       Left Ear: Tympanic membrane, ear canal and external ear normal    Cardiovascular:      Rate and Rhythm: Normal rate and regular rhythm  Pulses: Normal pulses  Heart sounds: Normal heart sounds  Pulmonary:      Effort: Pulmonary effort is normal       Breath sounds: Normal breath sounds  Abdominal:      Palpations: Abdomen is soft  Musculoskeletal:         General: Normal range of motion  Cervical back: Normal range of motion and neck supple  Skin:     General: Skin is warm  Neurological:      Mental Status: She is alert and oriented to person, place, and time  Psychiatric:         Mood and Affect: Mood normal          Behavior: Behavior normal          Thought Content: Thought content normal        BMI Counseling: Body mass index is 44 43 kg/m²  The BMI is above normal  Nutrition recommendations include decreasing portion sizes, encouraging healthy choices of fruits and vegetables, decreasing fast food intake, consuming healthier snacks, limiting drinks that contain sugar and moderation in carbohydrate intake  Exercise recommendations include exercising 3-5 times per week  No pharmacotherapy was ordered

## 2021-08-12 LAB
GAMMA INTERFERON BACKGROUND BLD IA-ACNC: 0.03 IU/ML
M TB IFN-G BLD-IMP: NEGATIVE
M TB IFN-G CD4+ BCKGRND COR BLD-ACNC: -0.01 IU/ML
M TB IFN-G CD4+ BCKGRND COR BLD-ACNC: 0.01 IU/ML
MEV IGG SER QL: NORMAL
MITOGEN IGNF BCKGRD COR BLD-ACNC: >10 IU/ML
MUV IGG SER QL: ABNORMAL
VZV IGG SER IA-ACNC: NORMAL

## 2021-08-15 DIAGNOSIS — I82.4Y9 DEEP VEIN THROMBOSIS (DVT) OF PROXIMAL LOWER EXTREMITY, UNSPECIFIED CHRONICITY, UNSPECIFIED LATERALITY (HCC): ICD-10-CM

## 2021-08-16 ENCOUNTER — LAB (OUTPATIENT)
Dept: LAB | Facility: AMBULARY SURGERY CENTER | Age: 36
End: 2021-08-16
Payer: COMMERCIAL

## 2021-08-16 DIAGNOSIS — E55.9 VITAMIN D DEFICIENCY: ICD-10-CM

## 2021-08-16 DIAGNOSIS — Z11.59 ENCOUNTER FOR HEPATITIS C SCREENING TEST FOR LOW RISK PATIENT: ICD-10-CM

## 2021-08-16 DIAGNOSIS — I82.4Y9 DEEP VEIN THROMBOSIS (DVT) OF PROXIMAL LOWER EXTREMITY, UNSPECIFIED CHRONICITY, UNSPECIFIED LATERALITY (HCC): ICD-10-CM

## 2021-08-16 DIAGNOSIS — E78.5 HYPERLIPIDEMIA, UNSPECIFIED HYPERLIPIDEMIA TYPE: ICD-10-CM

## 2021-08-16 DIAGNOSIS — I27.82 CHRONIC PULMONARY EMBOLISM WITHOUT ACUTE COR PULMONALE, UNSPECIFIED PULMONARY EMBOLISM TYPE (HCC): ICD-10-CM

## 2021-08-16 DIAGNOSIS — E11.9 TYPE 2 DIABETES MELLITUS WITHOUT COMPLICATION, WITHOUT LONG-TERM CURRENT USE OF INSULIN (HCC): ICD-10-CM

## 2021-08-16 DIAGNOSIS — E66.01 MORBID OBESITY WITH BMI OF 45.0-49.9, ADULT (HCC): ICD-10-CM

## 2021-08-16 LAB
25(OH)D3 SERPL-MCNC: 20.8 NG/ML (ref 30–100)
EST. AVERAGE GLUCOSE BLD GHB EST-MCNC: 206 MG/DL
HBA1C MFR BLD: 8.8 %
HCV AB SER QL: NORMAL
INR PPP: 2.57 (ref 0.84–1.19)
PROTHROMBIN TIME: 27.4 SECONDS (ref 11.6–14.5)
T4 FREE SERPL-MCNC: 0.99 NG/DL (ref 0.76–1.46)
TSH SERPL DL<=0.05 MIU/L-ACNC: 2.12 UIU/ML (ref 0.36–3.74)

## 2021-08-16 PROCEDURE — 36415 COLL VENOUS BLD VENIPUNCTURE: CPT

## 2021-08-16 PROCEDURE — 84443 ASSAY THYROID STIM HORMONE: CPT

## 2021-08-16 PROCEDURE — 84439 ASSAY OF FREE THYROXINE: CPT

## 2021-08-16 PROCEDURE — 86803 HEPATITIS C AB TEST: CPT

## 2021-08-16 PROCEDURE — 85610 PROTHROMBIN TIME: CPT

## 2021-08-16 PROCEDURE — 82306 VITAMIN D 25 HYDROXY: CPT

## 2021-08-16 PROCEDURE — 83036 HEMOGLOBIN GLYCOSYLATED A1C: CPT

## 2021-08-16 PROCEDURE — 3052F HG A1C>EQUAL 8.0%<EQUAL 9.0%: CPT | Performed by: INTERNAL MEDICINE

## 2021-08-16 RX ORDER — WARFARIN SODIUM 10 MG/1
TABLET ORAL
Qty: 30 TABLET | Refills: 0 | Status: SHIPPED | OUTPATIENT
Start: 2021-08-16 | End: 2021-09-13

## 2021-08-17 ENCOUNTER — ANTICOAG VISIT (OUTPATIENT)
Dept: FAMILY MEDICINE CLINIC | Facility: CLINIC | Age: 36
End: 2021-08-17

## 2021-08-17 ENCOUNTER — TELEPHONE (OUTPATIENT)
Dept: ENDOCRINOLOGY | Facility: CLINIC | Age: 36
End: 2021-08-17

## 2021-08-17 DIAGNOSIS — I26.99 PULMONARY EMBOLISM, UNSPECIFIED CHRONICITY, UNSPECIFIED PULMONARY EMBOLISM TYPE, UNSPECIFIED WHETHER ACUTE COR PULMONALE PRESENT (HCC): ICD-10-CM

## 2021-08-17 DIAGNOSIS — I82.4Y9 DEEP VEIN THROMBOSIS (DVT) OF PROXIMAL LOWER EXTREMITY, UNSPECIFIED CHRONICITY, UNSPECIFIED LATERALITY (HCC): Primary | ICD-10-CM

## 2021-08-17 NOTE — TELEPHONE ENCOUNTER
----- Message from Robson Sarkar MD sent at 8/17/2021 11:24 AM EDT -----  Low vitamin D - need replacement therapy  Will review upcoming visit and prescribe vitamin d

## 2021-08-19 ENCOUNTER — OFFICE VISIT (OUTPATIENT)
Dept: ENDOCRINOLOGY | Facility: CLINIC | Age: 36
End: 2021-08-19
Payer: COMMERCIAL

## 2021-08-19 VITALS
SYSTOLIC BLOOD PRESSURE: 102 MMHG | DIASTOLIC BLOOD PRESSURE: 70 MMHG | WEIGHT: 267.13 LBS | HEIGHT: 65 IN | HEART RATE: 80 BPM | BODY MASS INDEX: 44.51 KG/M2

## 2021-08-19 DIAGNOSIS — E78.5 HYPERLIPIDEMIA, UNSPECIFIED HYPERLIPIDEMIA TYPE: ICD-10-CM

## 2021-08-19 DIAGNOSIS — E11.9 TYPE 2 DIABETES MELLITUS WITHOUT COMPLICATION, WITHOUT LONG-TERM CURRENT USE OF INSULIN (HCC): Primary | ICD-10-CM

## 2021-08-19 DIAGNOSIS — E66.01 MORBID OBESITY WITH BMI OF 45.0-49.9, ADULT (HCC): ICD-10-CM

## 2021-08-19 DIAGNOSIS — E55.9 VITAMIN D DEFICIENCY: ICD-10-CM

## 2021-08-19 PROCEDURE — 1036F TOBACCO NON-USER: CPT | Performed by: INTERNAL MEDICINE

## 2021-08-19 PROCEDURE — 3008F BODY MASS INDEX DOCD: CPT | Performed by: INTERNAL MEDICINE

## 2021-08-19 PROCEDURE — 99214 OFFICE O/P EST MOD 30 MIN: CPT | Performed by: INTERNAL MEDICINE

## 2021-08-19 PROCEDURE — 3074F SYST BP LT 130 MM HG: CPT | Performed by: INTERNAL MEDICINE

## 2021-08-19 PROCEDURE — 3078F DIAST BP <80 MM HG: CPT | Performed by: INTERNAL MEDICINE

## 2021-08-19 RX ORDER — ERGOCALCIFEROL 1.25 MG/1
50000 CAPSULE ORAL WEEKLY
Qty: 8 CAPSULE | Refills: 0 | Status: SHIPPED | OUTPATIENT
Start: 2021-08-19 | End: 2022-02-11

## 2021-08-19 RX ORDER — MELATONIN
2000 DAILY
Qty: 60 TABLET | Refills: 4 | Status: SHIPPED | OUTPATIENT
Start: 2021-10-15 | End: 2022-02-11

## 2021-08-19 RX ORDER — METFORMIN HYDROCHLORIDE 500 MG/1
1000 TABLET, EXTENDED RELEASE ORAL 2 TIMES DAILY WITH MEALS
Qty: 120 TABLET | Refills: 3 | Status: SHIPPED | OUTPATIENT
Start: 2021-08-19 | End: 2022-01-25 | Stop reason: SDUPTHER

## 2021-08-19 NOTE — LETTER
Medical Fitness Referral    Patient: Mak Golden  : 1985  MRN: [id number]  Address: [place] [id number]  Phone Number: [phone]  E-mail: [email or web address]    [] Medical Disorders (Ex  Diabetes)   [] Cardiovascular Disorders (Ex  Hypertension)   [] Pulmonary Disorders (Ex  Asthma)   [] Cancer Disorders (Ex  Breast, Prostate)   [] Immunological & Hematological Disorders (Rheumatoid Arthritis)   [] Neurological Disorders (Ex  Parkinson's Disease)   [] Cognitive Disorders (Ex  Dementia)   [] Children & Adolescents (Ex  BMI)   [] Older Adults (Ex  Balance & Ambulation)   [] Female Specific Disorders (Ex  Osteoporosis)       Diagnoses:   1  Type 2 diabetes mellitus without complication, without long-term current use of insulin (HCC)  ergocalciferol (VITAMIN D2) 50,000 units    Hemoglobin A1C    Microalbumin / creatinine urine ratio    metFORMIN (GLUCOPHAGE-XR) 500 mg 24 hr tablet    Dulaglutide 1 5 MG/0 5ML SOPN    Lipid panel   2  Morbid obesity with BMI of 45 0-49 9, adult ([facility] )     3  Hyperlipidemia, unspecified hyperlipidemia type     4   Vitamin D deficiency       Additional Comments:    Service Requested:  [x] Medical Fitness Consult- Screening For Appropriateness of Medical Fitness Program   [x] Medical Fitness Program- Assessment of Body Composition, Aerobic, Anaerobic, Muscle Strength & Endurance, Flexibility, Neuromuscular & Functional Fitness   [x] Medical Fitness Assessment- Individualized Evidence-Based Exercise Program       Requesting Provider: Frankie Sow MD  Date: 21

## 2021-08-19 NOTE — PROGRESS NOTES
Follow-up Patient Progress Note      CC: type 2 diabetes, Morbid obesity  History of Present Illness:   28 yr female with type 2 diabetes 2015, morbid obesity, Factor V lieden mutation with VTE in 2007 and dislipidemia  Last visit was 4/14/21      Since last visit, she reports reduced appetite, improved polyuria, polydipsia but no weight changes  There was improvement in A1c  She will be joining [employer] as a       Diet:   Eats 3 meals a day  Does not count carbohydrates  Admits to dietary indiscretions  Activity: no regular activity      Home blood glucose monitoring: uses a freestyle lubna CGM  Data reviewed from 8/6/21 to 8/19/21  Usage 27% only  Av Gl: 171mg/dL  She has mostly postprandial hyperglycemia but data insufficient to interpret a full AGP  Hypoglycemia: No     Current meds:  Metformin 1000mg twice daily  Trulicity 9 20TT weekly     Opthamology: Yes  Podiatry: No; reports some pain bilateral feet  Influenza: Yes, COVID 19  Dental: No  Pancreatitis: No      Ace/ARB: No  Statin: No  Thyroid issues: No  Recent thyroid functions are ok      Patient Active Problem List   Diagnosis    Anxiety state    Type 2 diabetes mellitus ([facility] )    Deep vein thrombosis (DVT) (HCC)    [facility]    Hyperlipidemia    Pulmonary embolism ([facility] )    Gynecologic exam normal    Vulvar itching    Encounter for IUD insertion    Rash    Sprain of unspecified ligament of right ankle, initial encounter    Morbid obesity with BMI of 45 0-49 9, adult [facility])     Past Medical History:   Diagnosis Date    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HCC)     Epistaxis     Factor V Leiden mutation ([facility] )     Herpes zoster     Hypotension     Menorrhagia     Morbid obesity with BMI of 45 0-49 9, adult ([facility] ) 8/5/2019      Past Surgical History:   Procedure Laterality Date    DILATION AND CURETTAGE OF UTERUS      VENA CAVA FILTER PLACEMENT      INTERRUPTION INFERIOR, GREENFIRELD FILTER PLACEMENT      Family History   Problem Relation Age of Onset    Diabetes Father     Diabetes type II Father     Cancer Family     Stroke Family     Heart disease Family     Hypercoagulant Ability Family     Factor V Leiden deficiency Family         MUTATION     Social History     Tobacco Use    Smoking status: Former Smoker     Packs/day: 0 50     Years: 5 00     Pack years: 2 50     Types: Cigarettes     Start date: 10/9/2015     Quit date: 1/1/2018     Years since quitting: 3 6    Smokeless tobacco: Former User     Quit date: 1/8/2018    Tobacco comment: TOBACCO USE   Substance Use Topics    Alcohol use:  Yes     Alcohol/week: 0 0 standard drinks     Comment: rarely     Allergies   Allergen Reactions    No Known Allergies     Other      seasonal         Current Outpatient Medications:     Blood Glucose Monitoring Suppl MISC, test one time daily, Disp: , Rfl:     Continuous Blood Gluc  (FREESTYLE JOHN 14 DAY READER) GRISEL, Reading daily, Disp: 1 Device, Rfl: 1    Continuous Blood Gluc Sensor (FreeStyle John 14 Day Sensor) MISC, USE TO TEST DAILY, Disp: 100 each, Rfl: 5    Dulaglutide 0 75 MG/0 5ML SOPN, Inject 0 5 mL (0 75 mg total) under the skin once a week, Disp: 6 mL, Rfl: 0    famotidine (PEPCID) 40 MG tablet, TAKE 1 TABLET BY MOUTH DAILY AT BEDTIME, Disp: 90 tablet, Rfl: 3    FLUoxetine (PROzac) 20 mg capsule, Take 1 capsule (20 mg total) by mouth daily, Disp: 90 capsule, Rfl: 2    glucose blood test strip, test one time daily, Disp: , Rfl:     hydrOXYzine HCL (ATARAX) 25 mg tablet, TAKE 1 TABLET (25 MG TOTAL) BY MOUTH EVERY 6 (SIX) HOURS AS NEEDED FOR ANXIETY, Disp: 20 tablet, Rfl: 0    Lancets MISC, test one time daily, Disp: , Rfl:     metFORMIN (GLUCOPHAGE) 1000 MG tablet, TAKE 1 TABLET BY MOUTH TWICE A DAY WITH MEALS, Disp: 180 tablet, Rfl: 0    montelukast (SINGULAIR) 10 mg tablet, Take 1 tablet (10 mg total) by mouth daily at bedtime, Disp: 90 tablet, Rfl: 3   nystatin-triamcinolone (MYCOLOG-II) ointment, APPLY TO AFFECTED AREA TWICE A DAY, Disp: 30 g, Rfl: 0    warfarin (COUMADIN) 10 mg tablet, TAKE 1 TABLET BY MOUTH EVERY DAY, Disp: 30 tablet, Rfl: 0    Review of Systems   Constitutional: Positive for fatigue  HENT: Negative  Eyes: Negative  Respiratory: Negative  Cardiovascular: Negative  Gastrointestinal: Negative  Endocrine: Negative  Musculoskeletal: Negative  Skin: Negative  Allergic/Immunologic: Negative  Neurological: Negative  Hematological: Negative  Psychiatric/Behavioral: Negative  Physical Exam:  Body mass index is 44 45 kg/m²  /70 (BP Location: Left arm, Patient Position: Sitting, Cuff Size: Large)   Pulse 80   Ht 5' 5" (1 651 m)   Wt 121 kg (267 lb 2 oz)   BMI 44 45 kg/m²    Vitals:    08/19/21 1326   Weight: 121 kg (267 lb 2 oz)        Physical Exam  Constitutional:       Appearance: She is well-developed  HENT:      Head: Normocephalic  Eyes:      Pupils: Pupils are equal, round, and reactive to light  Neck:      Thyroid: No thyromegaly  Cardiovascular:      Rate and Rhythm: Normal rate  Heart sounds: Normal heart sounds  Pulmonary:      Effort: Pulmonary effort is normal       Breath sounds: Normal breath sounds  Abdominal:      General: Bowel sounds are normal       Palpations: Abdomen is soft  Musculoskeletal:         General: No deformity  Cervical back: Normal range of motion  Skin:     Capillary Refill: Capillary refill takes less than 2 seconds  Coloration: Skin is not pale  Findings: No rash  Neurological:      Mental Status: She is alert and oriented to person, place, and time           Labs:   Lab Results   Component Value Date    HGBA1C 8 8 (H) 08/16/2021       Lab Results   Component Value Date    WXO2EYMUPEEN 2 120 08/16/2021       Lab Results   Component Value Date    CREATININE 0 66 02/23/2021    CREATININE 0 62 02/08/2019    CREATININE 0 61 08/31/2018    BUN 13 02/23/2021    K 4 3 02/23/2021     02/23/2021    CO2 27 02/23/2021     eGFR   Date Value Ref Range Status   02/23/2021 115 ml/min/1 73sq m Final       Lab Results   Component Value Date    ALT 29 02/23/2021    AST 12 02/23/2021    ALKPHOS 84 02/23/2021       Lab Results   Component Value Date    CHOLESTEROL 223 (H) 02/23/2021    CHOLESTEROL 179 02/08/2019     Lab Results   Component Value Date    HDL 49 02/23/2021    HDL 42 02/08/2019     Lab Results   Component Value Date    TRIG 169 (H) 02/23/2021    TRIG 148 02/08/2019     Lab Results   Component Value Date    [place] 174 02/23/2021    [place] 137 02/08/2019         Impression:  1  Type 2 diabetes mellitus without complication, without long-term current use of insulin ([facility] [address] )    2  Morbid obesity with BMI of 45 0-49 9, adult ([facility] )    3  Hyperlipidemia, unspecified hyperlipidemia type    4  Vitamin D deficiency         Plan:    Angelica was seen today for diabetes type 2  Diagnoses and all orders for this visit:    Type 2 diabetes mellitus without complication, without long-term current use of insulin ([facility] [address] )  She is uncontrolled with A1c 8 8% but improving  She seems to be responding to GLP1 agonist   Today we discussed further lifestyle modifications including increased activity, [facility] [employer] fitness training program 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 and increase trulicity as listed below  Metformin was switched to XR preparation due to GI side effects  Advised to take with meals  Reiterated advise to scan Herzog sensor every 6 hours to capture data  Advised to share data in 2 weeks  Follow up in 4 months     -     Hemoglobin A1C; Future  -     Microalbumin / creatinine urine ratio; Future  -     metFORMIN (GLUCOPHAGE-XR) 500 mg 24 hr tablet;  Take 2 tablets (1,000 mg total) by mouth 2 (two) times a day with meals  -     Dulaglutide 1 5 MG/0 5ML SOPN; Inject 0 5 mL (1 5 mg total) under the skin once a week  -     Lipid panel; Future    Morbid obesity with BMI of 45 0-49 9, adult ([facility] )  Advised dietary and lifestyle changes  May consider evaluation with a sleep study via PCP  Monitor with increased dose GLP 1 agonist     Hyperlipidemia, unspecified hyperlipidemia type  Check fasting lipids prior to next visit  Vitamin D deficiency  Will start loading dose for 8 weeks followed by Vit D3 2000IU daily  -     ergocalciferol (VITAMIN D2) 50,000 units; Take 1 capsule (50,000 Units total) by mouth once a week for 8 doses          I have spent 30 minutes with patient today in which greater than 50% of this time was spent in counseling/coordination of care  Discussed with the patient and all questioned fully answered  She will call me if any problems arise  Educated/ Counseled patient on diagnostic test results, prognosis, risk vs benefit of treatment options, importance of treatment compliance, healthy life and lifestyle choices        [address]

## 2021-09-04 DIAGNOSIS — E11.9 TYPE 2 DIABETES MELLITUS WITHOUT COMPLICATION, WITHOUT LONG-TERM CURRENT USE OF INSULIN (HCC): ICD-10-CM

## 2021-09-07 RX ORDER — FLASH GLUCOSE SENSOR
KIT MISCELLANEOUS
Qty: 6 EACH | Refills: 5 | Status: SHIPPED | OUTPATIENT
Start: 2021-09-07 | End: 2022-02-11

## 2021-09-11 DIAGNOSIS — I82.4Y9 DEEP VEIN THROMBOSIS (DVT) OF PROXIMAL LOWER EXTREMITY, UNSPECIFIED CHRONICITY, UNSPECIFIED LATERALITY (HCC): ICD-10-CM

## 2021-09-13 RX ORDER — WARFARIN SODIUM 10 MG/1
TABLET ORAL
Qty: 30 TABLET | Refills: 0 | Status: SHIPPED | OUTPATIENT
Start: 2021-09-13 | End: 2021-09-28

## 2021-09-14 ENCOUNTER — TELEPHONE (OUTPATIENT)
Dept: DIABETES SERVICES | Facility: CLINIC | Age: 36
End: 2021-09-14

## 2021-09-14 DIAGNOSIS — F41.9 ANXIETY: ICD-10-CM

## 2021-09-14 RX ORDER — FLUOXETINE HYDROCHLORIDE 20 MG/1
20 CAPSULE ORAL DAILY
Qty: 90 CAPSULE | Refills: 2 | Status: SHIPPED | OUTPATIENT
Start: 2021-09-14 | End: 2022-01-31 | Stop reason: SDUPTHER

## 2021-09-14 NOTE — TELEPHONE ENCOUNTER
Angelica called and left a voice mail asking for her trulicity to go to a different pharmacy  Her phone number is [phone]  Thanks

## 2021-09-15 ENCOUNTER — TELEPHONE (OUTPATIENT)
Dept: ENDOCRINOLOGY | Facility: CLINIC | Age: 36
End: 2021-09-15

## 2021-09-15 NOTE — TELEPHONE ENCOUNTER
----- Message from Emerita Christine sent at 9/14/2021  4:36 PM EDT -----  Regarding: Non-Urgent Medical Question  Contact: [phone]  I would like [place] [facility]   Any questions please call me

## 2021-09-15 NOTE — TELEPHONE ENCOUNTER
Alessia Suarez  to Colene Alpers, MD          9/14/21 4:36 PM  I would like to [facility] [place] [facility]   Any questions please call me

## 2021-09-27 DIAGNOSIS — I82.4Y9 DEEP VEIN THROMBOSIS (DVT) OF PROXIMAL LOWER EXTREMITY, UNSPECIFIED CHRONICITY, UNSPECIFIED LATERALITY (HCC): ICD-10-CM

## 2021-09-28 RX ORDER — WARFARIN SODIUM 10 MG/1
TABLET ORAL
Qty: 90 TABLET | Refills: 1 | Status: SHIPPED | OUTPATIENT
Start: 2021-09-28 | End: 2021-11-08

## 2021-11-15 ENCOUNTER — OFFICE VISIT (OUTPATIENT)
Dept: URGENT CARE | Age: 36
End: 2021-11-15
Payer: COMMERCIAL

## 2021-11-15 VITALS
RESPIRATION RATE: 18 BRPM | HEIGHT: 65 IN | TEMPERATURE: 96.6 F | BODY MASS INDEX: 44.48 KG/M2 | OXYGEN SATURATION: 98 % | DIASTOLIC BLOOD PRESSURE: 84 MMHG | HEART RATE: 94 BPM | WEIGHT: 267 LBS | SYSTOLIC BLOOD PRESSURE: 131 MMHG

## 2021-11-15 DIAGNOSIS — Z11.52 ENCOUNTER FOR SCREENING FOR COVID-19: Primary | ICD-10-CM

## 2021-11-15 DIAGNOSIS — J34.89 SINUS PRESSURE: ICD-10-CM

## 2021-11-15 PROCEDURE — 99213 OFFICE O/P EST LOW 20 MIN: CPT | Performed by: NURSE PRACTITIONER

## 2021-11-15 PROCEDURE — U0003 INFECTIOUS AGENT DETECTION BY NUCLEIC ACID (DNA OR RNA); SEVERE ACUTE RESPIRATORY SYNDROME CORONAVIRUS 2 (SARS-COV-2) (CORONAVIRUS DISEASE [COVID-19]), AMPLIFIED PROBE TECHNIQUE, MAKING USE OF HIGH THROUGHPUT TECHNOLOGIES AS DESCRIBED BY CMS-2020-01-R: HCPCS | Performed by: NURSE PRACTITIONER

## 2021-11-15 PROCEDURE — U0005 INFEC AGEN DETEC AMPLI PROBE: HCPCS | Performed by: NURSE PRACTITIONER

## 2021-11-15 RX ORDER — PREDNISONE 20 MG/1
20 TABLET ORAL 2 TIMES DAILY WITH MEALS
Qty: 10 TABLET | Refills: 0 | Status: SHIPPED | OUTPATIENT
Start: 2021-11-15 | End: 2021-11-20

## 2021-11-16 ENCOUNTER — TELEPHONE (OUTPATIENT)
Dept: FAMILY MEDICINE CLINIC | Facility: CLINIC | Age: 36
End: 2021-11-16

## 2021-11-16 LAB — SARS-COV-2 RNA RESP QL NAA+PROBE: POSITIVE

## 2021-11-17 ENCOUNTER — TELEMEDICINE (OUTPATIENT)
Dept: FAMILY MEDICINE CLINIC | Facility: CLINIC | Age: 36
End: 2021-11-17
Payer: COMMERCIAL

## 2021-11-17 DIAGNOSIS — U07.1 COVID-19: Primary | ICD-10-CM

## 2021-11-17 PROCEDURE — 99213 OFFICE O/P EST LOW 20 MIN: CPT | Performed by: NURSE PRACTITIONER

## 2021-11-23 ENCOUNTER — TELEMEDICINE (OUTPATIENT)
Dept: FAMILY MEDICINE CLINIC | Facility: CLINIC | Age: 36
End: 2021-11-23
Payer: COMMERCIAL

## 2021-11-23 VITALS — TEMPERATURE: 96.6 F

## 2021-11-23 DIAGNOSIS — U07.1 COVID-19: Primary | ICD-10-CM

## 2021-11-23 PROCEDURE — 99213 OFFICE O/P EST LOW 20 MIN: CPT | Performed by: NURSE PRACTITIONER

## 2021-11-29 ENCOUNTER — DOCUMENTATION (OUTPATIENT)
Dept: FAMILY MEDICINE CLINIC | Facility: CLINIC | Age: 36
End: 2021-11-29

## 2021-12-03 DIAGNOSIS — E11.9 TYPE 2 DIABETES MELLITUS WITHOUT COMPLICATION, WITHOUT LONG-TERM CURRENT USE OF INSULIN (HCC): ICD-10-CM

## 2021-12-03 DIAGNOSIS — J30.2 SEASONAL ALLERGIES: ICD-10-CM

## 2021-12-06 DIAGNOSIS — E11.9 TYPE 2 DIABETES MELLITUS WITHOUT COMPLICATION, WITHOUT LONG-TERM CURRENT USE OF INSULIN (HCC): ICD-10-CM

## 2021-12-06 RX ORDER — MONTELUKAST SODIUM 10 MG/1
TABLET ORAL
Qty: 90 TABLET | Refills: 3 | Status: SHIPPED | OUTPATIENT
Start: 2021-12-06 | End: 2022-04-18 | Stop reason: SDUPTHER

## 2022-01-25 DIAGNOSIS — E11.9 TYPE 2 DIABETES MELLITUS WITHOUT COMPLICATION, WITHOUT LONG-TERM CURRENT USE OF INSULIN (HCC): ICD-10-CM

## 2022-01-25 RX ORDER — METFORMIN HYDROCHLORIDE 500 MG/1
1000 TABLET, EXTENDED RELEASE ORAL 2 TIMES DAILY WITH MEALS
Qty: 120 TABLET | Refills: 1 | Status: SHIPPED | OUTPATIENT
Start: 2022-01-25 | End: 2022-02-18 | Stop reason: SDUPTHER

## 2022-01-31 DIAGNOSIS — F41.9 ANXIETY: ICD-10-CM

## 2022-01-31 RX ORDER — FLUOXETINE HYDROCHLORIDE 20 MG/1
20 CAPSULE ORAL DAILY
Qty: 90 CAPSULE | Refills: 2 | Status: SHIPPED | OUTPATIENT
Start: 2022-01-31 | End: 2022-01-31 | Stop reason: SDUPTHER

## 2022-02-11 ENCOUNTER — LAB (OUTPATIENT)
Dept: LAB | Facility: CLINIC | Age: 37
End: 2022-02-11
Payer: COMMERCIAL

## 2022-02-11 ENCOUNTER — OFFICE VISIT (OUTPATIENT)
Dept: FAMILY MEDICINE CLINIC | Facility: CLINIC | Age: 37
End: 2022-02-11
Payer: COMMERCIAL

## 2022-02-11 ENCOUNTER — TELEPHONE (OUTPATIENT)
Dept: FAMILY MEDICINE CLINIC | Facility: CLINIC | Age: 37
End: 2022-02-11

## 2022-02-11 VITALS
SYSTOLIC BLOOD PRESSURE: 118 MMHG | WEIGHT: 272.6 LBS | TEMPERATURE: 96.2 F | BODY MASS INDEX: 43.81 KG/M2 | HEIGHT: 66 IN | DIASTOLIC BLOOD PRESSURE: 78 MMHG | RESPIRATION RATE: 18 BRPM | HEART RATE: 76 BPM

## 2022-02-11 DIAGNOSIS — I27.82 CHRONIC PULMONARY EMBOLISM WITHOUT ACUTE COR PULMONALE, UNSPECIFIED PULMONARY EMBOLISM TYPE (HCC): ICD-10-CM

## 2022-02-11 DIAGNOSIS — E11.9 TYPE 2 DIABETES MELLITUS WITHOUT COMPLICATION, WITHOUT LONG-TERM CURRENT USE OF INSULIN (HCC): ICD-10-CM

## 2022-02-11 DIAGNOSIS — I82.4Y9 DEEP VEIN THROMBOSIS (DVT) OF PROXIMAL LOWER EXTREMITY, UNSPECIFIED CHRONICITY, UNSPECIFIED LATERALITY (HCC): ICD-10-CM

## 2022-02-11 DIAGNOSIS — Z11.59 SCREENING FOR VIRAL DISEASE: ICD-10-CM

## 2022-02-11 DIAGNOSIS — E11.9 TYPE 2 DIABETES MELLITUS WITHOUT COMPLICATION, WITHOUT LONG-TERM CURRENT USE OF INSULIN (HCC): Primary | ICD-10-CM

## 2022-02-11 DIAGNOSIS — D68.51 FACTOR V LEIDEN MUTATION (HCC): ICD-10-CM

## 2022-02-11 LAB
ALBUMIN SERPL BCP-MCNC: 3.3 G/DL (ref 3.5–5)
ALP SERPL-CCNC: 69 U/L (ref 46–116)
ALT SERPL W P-5'-P-CCNC: 25 U/L (ref 12–78)
ANION GAP SERPL CALCULATED.3IONS-SCNC: 5 MMOL/L (ref 4–13)
AST SERPL W P-5'-P-CCNC: 9 U/L (ref 5–45)
BILIRUB SERPL-MCNC: 0.73 MG/DL (ref 0.2–1)
BUN SERPL-MCNC: 10 MG/DL (ref 5–25)
CALCIUM ALBUM COR SERPL-MCNC: 9.7 MG/DL (ref 8.3–10.1)
CALCIUM SERPL-MCNC: 9.1 MG/DL (ref 8.3–10.1)
CHLORIDE SERPL-SCNC: 105 MMOL/L (ref 100–108)
CHOLEST SERPL-MCNC: 205 MG/DL
CO2 SERPL-SCNC: 25 MMOL/L (ref 21–32)
CREAT SERPL-MCNC: 0.64 MG/DL (ref 0.6–1.3)
CREAT UR-MCNC: 137 MG/DL
EST. AVERAGE GLUCOSE BLD GHB EST-MCNC: 229 MG/DL
GFR SERPL CREATININE-BSD FRML MDRD: 115 ML/MIN/1.73SQ M
GLUCOSE P FAST SERPL-MCNC: 236 MG/DL (ref 65–99)
HBA1C MFR BLD: 9.6 %
HDLC SERPL-MCNC: 44 MG/DL
INR PPP: 2.03 (ref 0.84–1.19)
LDLC SERPL CALC-MCNC: 129 MG/DL (ref 0–100)
MICROALBUMIN UR-MCNC: 15.7 MG/L (ref 0–20)
MICROALBUMIN/CREAT 24H UR: 11 MG/G CREATININE (ref 0–30)
POTASSIUM SERPL-SCNC: 4.1 MMOL/L (ref 3.5–5.3)
PROT SERPL-MCNC: 7.4 G/DL (ref 6.4–8.2)
PROTHROMBIN TIME: 22 SECONDS (ref 11.6–14.5)
SL AMB POCT HEMOGLOBIN AIC: 9.5 (ref ?–6.5)
SODIUM SERPL-SCNC: 135 MMOL/L (ref 136–145)
TRIGL SERPL-MCNC: 159 MG/DL

## 2022-02-11 PROCEDURE — 85610 PROTHROMBIN TIME: CPT | Performed by: NURSE PRACTITIONER

## 2022-02-11 PROCEDURE — 3008F BODY MASS INDEX DOCD: CPT | Performed by: NURSE PRACTITIONER

## 2022-02-11 PROCEDURE — 82570 ASSAY OF URINE CREATININE: CPT

## 2022-02-11 PROCEDURE — 1036F TOBACCO NON-USER: CPT | Performed by: NURSE PRACTITIONER

## 2022-02-11 PROCEDURE — 99214 OFFICE O/P EST MOD 30 MIN: CPT | Performed by: NURSE PRACTITIONER

## 2022-02-11 PROCEDURE — 80053 COMPREHEN METABOLIC PANEL: CPT

## 2022-02-11 PROCEDURE — 83036 HEMOGLOBIN GLYCOSYLATED A1C: CPT

## 2022-02-11 PROCEDURE — 83036 HEMOGLOBIN GLYCOSYLATED A1C: CPT | Performed by: NURSE PRACTITIONER

## 2022-02-11 PROCEDURE — 36415 COLL VENOUS BLD VENIPUNCTURE: CPT

## 2022-02-11 PROCEDURE — 3061F NEG MICROALBUMINURIA REV: CPT | Performed by: NURSE PRACTITIONER

## 2022-02-11 PROCEDURE — 3078F DIAST BP <80 MM HG: CPT | Performed by: NURSE PRACTITIONER

## 2022-02-11 PROCEDURE — 82043 UR ALBUMIN QUANTITATIVE: CPT

## 2022-02-11 PROCEDURE — 87389 HIV-1 AG W/HIV-1&-2 AB AG IA: CPT

## 2022-02-11 PROCEDURE — 3046F HEMOGLOBIN A1C LEVEL >9.0%: CPT | Performed by: NURSE PRACTITIONER

## 2022-02-11 PROCEDURE — 3074F SYST BP LT 130 MM HG: CPT | Performed by: NURSE PRACTITIONER

## 2022-02-11 PROCEDURE — 80061 LIPID PANEL: CPT

## 2022-02-11 RX ORDER — BLOOD SUGAR DIAGNOSTIC
STRIP MISCELLANEOUS
Qty: 200 EACH | Refills: 3 | Status: SHIPPED | OUTPATIENT
Start: 2022-02-11 | End: 2022-03-17 | Stop reason: SDUPTHER

## 2022-02-11 RX ORDER — BLOOD-GLUCOSE METER
KIT MISCELLANEOUS
Qty: 1 KIT | Refills: 0 | Status: SHIPPED | OUTPATIENT
Start: 2022-02-11 | End: 2022-03-17 | Stop reason: SDUPTHER

## 2022-02-11 RX ORDER — LANCETS 33 GAUGE
EACH MISCELLANEOUS
Qty: 200 EACH | Refills: 3 | Status: SHIPPED | OUTPATIENT
Start: 2022-02-11 | End: 2022-03-17 | Stop reason: SDUPTHER

## 2022-02-11 NOTE — PROGRESS NOTES
Assessment/Plan:         Diagnoses and all orders for this visit:    Type 2 diabetes mellitus without complication, without long-term current use of insulin ([facility])  -     POCT hemoglobin A1c  -     Lipid Panel with Direct LDL reflex; Future  -     Microalbumin / creatinine urine ratio; Future  -     Comprehensive metabolic panel; Future  -     Blood Glucose Monitoring Suppl (OneTouch Verio Reflect) w/Device KIT; Check blood sugars twice daily  Please substitute with appropriate alternative as covered by patient's insurance  Dx: E11 65  -     glucose blood (OneTouch Verio) test strip; Check blood sugars twice daily  Please substitute with appropriate alternative as covered by patient's insurance  Dx: E11 65  -     OneTouch Delica Lancets 11X MISC; Check blood sugars twice daily  Please substitute with appropriate alternative as covered by patient's insurance  Dx: E11 65    Screening for viral disease  -     HIV 1/2 Antigen/Antibody (4th Generation) w Reflex [facility]; Future    Deep vein thrombosis (DVT) of proximal lower extremity, unspecified chronicity, unspecified laterality ([place] [address] )  -     Protime-INR; Standing  -     Protime-INR    Chronic pulmonary embolism without acute cor pulmonale, unspecified pulmonary embolism type ([place] [address] )  -     Protime-INR; Standing  -     Protime-INR    Factor V Leiden mutation ([place] [address] )  -     Protime-INR; Standing  -     Protime-INR    BMI 40 0-44 9, adult ([place] [address] )  -     Ambulatory Referral to Weight Management; Future          Subjective:      Patient ID: Felipe Hansen is a 39 y o  female  HPI  Here for follow up on the DM    Was using the [place] and found sugars were not accurate   Said low and took food    Rohini and finger stick different  Not great at getting PT/INR done  Blocked ears   No pain          The following portions of the patient's history were reviewed and updated as appropriate: allergies, current medications, past family history, past medical history, past social history, past surgical history and problem list     Review of Systems   Constitutional: Negative for activity change, appetite change, chills and fever  HENT: Negative for congestion, ear pain, postnasal drip, rhinorrhea and sore throat  Eyes: Negative for pain and visual disturbance  Respiratory: Negative for cough, chest tightness and shortness of breath  Cardiovascular: Negative for chest pain, palpitations and leg swelling  Gastrointestinal: Negative for abdominal pain, constipation, diarrhea and vomiting  Genitourinary: Negative for dysuria, frequency, hematuria and urgency  Musculoskeletal: Negative for arthralgias and back pain  Skin: Negative for color change and rash  Neurological: Negative for dizziness, seizures, syncope, light-headedness and headaches  Psychiatric/Behavioral: The patient is not nervous/anxious  All other systems reviewed and are negative  Objective:  Vitals:    02/11/22 0704   BP: 118/78   BP Location: Left arm   Patient Position: Sitting   Cuff Size: Large   Pulse: 76   Resp: 18   Temp: (!) 96 2 °F (35 7 °C)   TempSrc: Tympanic   Weight: 124 kg (272 lb 9 6 oz)   Height: 5' 5 5" (1 664 m)      Physical Exam  Vitals reviewed  Constitutional:       Appearance: Normal appearance  She is obese  HENT:      Right Ear: Tympanic membrane, ear canal and external ear normal       Left Ear: Tympanic membrane, ear canal and external ear normal    Eyes:      Conjunctiva/sclera: Conjunctivae normal       Pupils: Pupils are equal, round, and reactive to light  Cardiovascular:      Rate and Rhythm: Normal rate and regular rhythm  Pulses: Normal pulses  Heart sounds: Normal heart sounds  Pulmonary:      Effort: Pulmonary effort is normal       Breath sounds: Normal breath sounds  Abdominal:      General: Bowel sounds are normal       Palpations: Abdomen is soft  Musculoskeletal:         General: Normal range of motion        Cervical back: Normal range of motion and neck supple  Skin:     General: Skin is warm  Neurological:      General: No focal deficit present  Mental Status: She is alert and oriented to person, place, and time  Psychiatric:         Mood and Affect: Mood normal          Behavior: Behavior normal          Thought Content:  Thought content normal

## 2022-02-11 NOTE — TELEPHONE ENCOUNTER
Patient requesting results of blood work aware Corazon not in office until Tuesday  ALSO  patient notified of PT/INR result and instructions given

## 2022-02-12 LAB — HIV 1+2 AB+HIV1 P24 AG SERPL QL IA: NORMAL

## 2022-02-14 ENCOUNTER — TELEPHONE (OUTPATIENT)
Dept: ENDOCRINOLOGY | Facility: CLINIC | Age: 37
End: 2022-02-14

## 2022-02-18 DIAGNOSIS — E11.9 TYPE 2 DIABETES MELLITUS WITHOUT COMPLICATION, WITHOUT LONG-TERM CURRENT USE OF INSULIN (HCC): ICD-10-CM

## 2022-02-18 RX ORDER — METFORMIN HYDROCHLORIDE 500 MG/1
1000 TABLET, EXTENDED RELEASE ORAL 2 TIMES DAILY WITH MEALS
Qty: 120 TABLET | Refills: 0 | Status: SHIPPED | OUTPATIENT
Start: 2022-02-18 | End: 2022-03-16 | Stop reason: SDUPTHER

## 2022-03-16 DIAGNOSIS — E11.9 TYPE 2 DIABETES MELLITUS WITHOUT COMPLICATION, WITHOUT LONG-TERM CURRENT USE OF INSULIN (HCC): ICD-10-CM

## 2022-03-16 RX ORDER — METFORMIN HYDROCHLORIDE 500 MG/1
1000 TABLET, EXTENDED RELEASE ORAL 2 TIMES DAILY WITH MEALS
Qty: 120 TABLET | Refills: 0 | Status: SHIPPED | OUTPATIENT
Start: 2022-03-16 | End: 2022-05-30

## 2022-03-17 ENCOUNTER — TELEPHONE (OUTPATIENT)
Dept: ENDOCRINOLOGY | Facility: CLINIC | Age: 37
End: 2022-03-17

## 2022-03-17 DIAGNOSIS — E11.9 TYPE 2 DIABETES MELLITUS WITHOUT COMPLICATION, WITHOUT LONG-TERM CURRENT USE OF INSULIN (HCC): ICD-10-CM

## 2022-03-17 RX ORDER — LANCETS 33 GAUGE
EACH MISCELLANEOUS
Qty: 200 EACH | Refills: 0 | Status: SHIPPED | OUTPATIENT
Start: 2022-03-17

## 2022-03-17 RX ORDER — BLOOD SUGAR DIAGNOSTIC
STRIP MISCELLANEOUS
Qty: 200 EACH | Refills: 0 | Status: SHIPPED | OUTPATIENT
Start: 2022-03-17

## 2022-03-17 RX ORDER — BLOOD-GLUCOSE METER
KIT MISCELLANEOUS
Qty: 1 KIT | Refills: 0 | Status: SHIPPED | OUTPATIENT
Start: 2022-03-17

## 2022-03-17 NOTE — TELEPHONE ENCOUNTER
WILLIE WYLIE Case ID: [id number] - Rx #: [id number]  Need help?   Call us at [phone]    Status   Sent to [employer]    Drug    Trulicity 0 0AB/9 4GM pen-injectors   Form    [facility] Prior [address] [facility]

## 2022-03-25 PROBLEM — H90.3 SENSORINEURAL HEARING LOSS (SNHL) OF BOTH EARS: Status: ACTIVE | Noted: 2022-03-25

## 2022-04-18 DIAGNOSIS — K21.9 GASTROESOPHAGEAL REFLUX DISEASE WITHOUT ESOPHAGITIS: ICD-10-CM

## 2022-04-18 DIAGNOSIS — J30.2 SEASONAL ALLERGIES: ICD-10-CM

## 2022-04-19 RX ORDER — FAMOTIDINE 40 MG/1
40 TABLET, FILM COATED ORAL
Qty: 90 TABLET | Refills: 0 | Status: SHIPPED | OUTPATIENT
Start: 2022-04-19

## 2022-04-19 RX ORDER — MONTELUKAST SODIUM 10 MG/1
10 TABLET ORAL
Qty: 90 TABLET | Refills: 0 | Status: SHIPPED | OUTPATIENT
Start: 2022-04-19 | End: 2022-07-26

## 2022-05-13 ENCOUNTER — OFFICE VISIT (OUTPATIENT)
Dept: FAMILY MEDICINE CLINIC | Facility: CLINIC | Age: 37
End: 2022-05-13
Payer: COMMERCIAL

## 2022-05-13 VITALS
WEIGHT: 273 LBS | SYSTOLIC BLOOD PRESSURE: 120 MMHG | BODY MASS INDEX: 43.87 KG/M2 | DIASTOLIC BLOOD PRESSURE: 82 MMHG | HEIGHT: 66 IN | OXYGEN SATURATION: 98 % | TEMPERATURE: 97.2 F | HEART RATE: 103 BPM

## 2022-05-13 DIAGNOSIS — J30.2 SEASONAL ALLERGIES: ICD-10-CM

## 2022-05-13 DIAGNOSIS — E11.9 TYPE 2 DIABETES MELLITUS WITHOUT COMPLICATION, WITHOUT LONG-TERM CURRENT USE OF INSULIN (HCC): Primary | ICD-10-CM

## 2022-05-13 LAB — SL AMB POCT HEMOGLOBIN AIC: 10.1 (ref ?–6.5)

## 2022-05-13 PROCEDURE — 99214 OFFICE O/P EST MOD 30 MIN: CPT | Performed by: NURSE PRACTITIONER

## 2022-05-13 PROCEDURE — 83036 HEMOGLOBIN GLYCOSYLATED A1C: CPT | Performed by: NURSE PRACTITIONER

## 2022-05-13 RX ORDER — DULAGLUTIDE 0.75 MG/.5ML
3 INJECTION, SOLUTION SUBCUTANEOUS
Qty: 24 ML | Refills: 1 | Status: SHIPPED | OUTPATIENT
Start: 2022-05-13 | End: 2022-05-13

## 2022-05-13 RX ORDER — FLASH GLUCOSE SCANNING READER
1 EACH MISCELLANEOUS 3 TIMES DAILY
Qty: 1 EACH | Refills: 0 | Status: SHIPPED | OUTPATIENT
Start: 2022-05-13 | End: 2022-06-01 | Stop reason: CLARIF

## 2022-05-13 RX ORDER — LEVOCETIRIZINE DIHYDROCHLORIDE 5 MG/1
5 TABLET, FILM COATED ORAL EVERY EVENING
Qty: 90 TABLET | Refills: 1 | Status: SHIPPED | OUTPATIENT
Start: 2022-05-13

## 2022-05-13 RX ORDER — FLASH GLUCOSE SENSOR
1 KIT MISCELLANEOUS 3 TIMES DAILY
Qty: 2 EACH | Refills: 4 | Status: SHIPPED | OUTPATIENT
Start: 2022-05-13 | End: 2022-06-01 | Stop reason: CLARIF

## 2022-05-13 NOTE — PROGRESS NOTES
Assessment/Plan:         Diagnoses and all orders for this visit:    Type 2 diabetes mellitus without complication, without long-term current use of insulin (HCC)  -     Discontinue: Dulaglutide (Trulicity) 1 61 KD/1 0QT SOPN; Inject 2 mL (3 mg total) under the skin every 7 days  -     Continuous Blood Gluc Sensor (FreeStyle Rohini 14 Day Sensor) MISC; Use 1 applicator 3 (three) times a day  -     Continuous Blood Gluc  (FreeStyle Rohini 14 Day [place]) GRISEL; Use 1 Device 3 (three) times a day  -     Dulaglutide 3 MG/0 5ML SOPN; Inject 0 5 mL (3 mg total) under the skin once a week    Seasonal allergies  -     levocetirizine (XYZAL) 5 MG tablet; Take 1 tablet (5 mg total) by mouth every evening          Subjective:      Patient ID: Bam Swift is a 39 y o  female  HPI  Here for DM    Testing at home   At times am [phone]   Feeling good except allergies    They are flaring         The following portions of the patient's history were reviewed and updated as appropriate: allergies, current medications, past family history, past medical history, past social history, past surgical history and problem list     Review of Systems   Constitutional: Negative for chills and fever  HENT: Negative for congestion, ear pain, postnasal drip, rhinorrhea and sore throat  Eyes: Negative for pain and visual disturbance  Respiratory: Negative for cough, chest tightness and shortness of breath  Cardiovascular: Negative for chest pain and palpitations  Gastrointestinal: Negative for abdominal pain, constipation, diarrhea, nausea and vomiting  Genitourinary: Negative for dysuria, frequency, hematuria and urgency  Musculoskeletal: Negative for arthralgias and back pain  Skin: Negative for color change and rash  Neurological: Negative for dizziness, seizures, syncope, light-headedness and headaches  Psychiatric/Behavioral: The patient is not nervous/anxious  All other systems reviewed and are negative  Objective:  Vitals:    05/13/22 1115   BP: 120/82   BP Location: Left arm   Patient Position: Sitting   Cuff Size: Large   Pulse: 103   Temp: (!) 97 2 °F (36 2 °C)   TempSrc: Tympanic   SpO2: 98%   Weight: 124 kg (273 lb)   Height: 5' 5 5" (1 664 m)      Physical Exam  Vitals reviewed  Constitutional:       Appearance: Normal appearance  She is well-developed  She is obese  HENT:      Head: Normocephalic  Right Ear: Tympanic membrane, ear canal and external ear normal       Left Ear: Tympanic membrane, ear canal and external ear normal       Nose: Nose normal       Mouth/Throat:      Pharynx: Oropharynx is clear  Eyes:      General:         Right eye: No discharge  Left eye: No discharge  Conjunctiva/sclera: Conjunctivae normal       Pupils: Pupils are equal, round, and reactive to light  Neck:      Thyroid: No thyromegaly  Cardiovascular:      Rate and Rhythm: Normal rate and regular rhythm  Pulses: Normal pulses  no weak pulses          Dorsalis pedis pulses are 2+ on the right side and 2+ on the left side  Heart sounds: Normal heart sounds  No murmur heard  Pulmonary:      Effort: Pulmonary effort is normal       Breath sounds: Normal breath sounds  Abdominal:      General: Bowel sounds are normal       Palpations: Abdomen is soft  Tenderness: There is no abdominal tenderness  Musculoskeletal:         General: Normal range of motion  Cervical back: Normal range of motion and neck supple  Feet:      Right foot:      Skin integrity: No ulcer, skin breakdown, erythema, warmth, callus or dry skin  Left foot:      Skin integrity: No ulcer, skin breakdown, erythema, warmth, callus or dry skin  Lymphadenopathy:      Cervical: No cervical adenopathy  Skin:     General: Skin is warm  Findings: No rash  Neurological:      General: No focal deficit present  Mental Status: She is alert and oriented to person, place, and time     Psychiatric: Mood and Affect: Mood normal          Behavior: Behavior normal          Thought Content: Thought content normal          Judgment: Judgment normal        Patient's shoes and socks removed  Right Foot/Ankle   Right Foot Inspection  Skin Exam: skin normal and skin intact  No dry skin, no warmth, no callus, no erythema, no maceration, no abnormal color, no pre-ulcer, no ulcer and no callus  Toe Exam: ROM and strength within normal limits  Sensory   Monofilament testing: intact    Vascular  Capillary refills: < 3 seconds  The right DP pulse is 2+  Left Foot/Ankle  Left Foot Inspection  Skin Exam: skin normal and skin intact  No dry skin, no warmth, no erythema, no maceration, normal color, no pre-ulcer, no ulcer and no callus  Toe Exam: ROM and strength within normal limits  Sensory   Monofilament testing: intact    Vascular  Capillary refills: < 3 seconds  The left DP pulse is 2+       Assign Risk Category  No deformity present  No loss of protective sensation  No weak pulses  Risk: 0

## 2022-05-16 ENCOUNTER — TELEPHONE (OUTPATIENT)
Dept: FAMILY MEDICINE CLINIC | Facility: CLINIC | Age: 37
End: 2022-05-16

## 2022-05-19 ENCOUNTER — TELEPHONE (OUTPATIENT)
Dept: PSYCHIATRY | Facility: CLINIC | Age: 37
End: 2022-05-19

## 2022-05-19 NOTE — TELEPHONE ENCOUNTER
Behavorial Health Outpatient Intake Questions    Referred by: self ( work)    Please advised interviewee that they need to answer all questions truthfully to allow for best care and any misrepresentations of information may affect their ability to be seen at this clinic   => Was this discussed? Yes     Behavorial Health Outpatient Intake History -     Presenting Problem (in patient's words): Want to talk about feelings, learn to not care so much about little things  Are there any developmental disabilities? ? If yes, can they speak to you on the phone? If they are too limited to speak to you on phone, refer out No    Are you taking any psychiatric medications? Yes    => If yes, who prescribes? If yes, are they injectable medications? Prozac    Does the patient have a language barrier or hearing impairment? No    Have you been treated at [facility] by a therapist or a doctor in the past? If yes, who? No    Has the patient been hospitalized for mental health? No   If yes, how long ago was last hospitalization and where was it? Do you actively use alcohol or marijuana or illegal substances? If yes, what and how much - refer out to Drug and alcohol treatment if use is excessive or daily use of illegal substances No concerns of substance abuse are reported  Do you have a community treatment team or ? No    Legal History-     Does the patient have any history of arrests, group home/nursing home time, or DUIs? No  If Yes-  1) What types of charges? 2) When were they last incarcerated? 3) Are they currently on parole or probation? Minor Child-    Who has custody of the child? Is there a custody agreement? If there is a custody agreement remind parent that they must bring a copy to the first appt or they will not be seen       Intake Team, please check with provider before scheduling if flags come up such as:  - complex case  - legal history (other than DUI)  - communication barrier concerns are present  - if, in your judgment, this needs further review    ACCEPTED as a patient Yes  => Appointment Date: 6/7/22 @ 330pm with harlan levin     Referred Elsewhere? Name of Insurance Co: [employer]/ Alessandro Vasquez ID# [id number]  [id number] Phone #  If ins is primary or secondary  If patient is a minor, parents information such as Name, D  O B of guarantor

## 2022-06-01 DIAGNOSIS — E11.9 TYPE 2 DIABETES MELLITUS WITHOUT COMPLICATION, WITHOUT LONG-TERM CURRENT USE OF INSULIN (HCC): Primary | ICD-10-CM

## 2022-06-01 RX ORDER — BLOOD-GLUCOSE TRANSMITTER
EACH MISCELLANEOUS
Qty: 1 EACH | Refills: 3 | Status: SHIPPED | OUTPATIENT
Start: 2022-06-01 | End: 2022-06-17 | Stop reason: SDUPTHER

## 2022-06-01 RX ORDER — BLOOD-GLUCOSE,RECEIVER,CONT
EACH MISCELLANEOUS
Qty: 1 EACH | Refills: 0 | Status: SHIPPED | OUTPATIENT
Start: 2022-06-01

## 2022-06-01 RX ORDER — BLOOD-GLUCOSE SENSOR
EACH MISCELLANEOUS
Qty: 3 EACH | Refills: 3 | Status: SHIPPED | OUTPATIENT
Start: 2022-06-01

## 2022-06-01 NOTE — TELEPHONE ENCOUNTER
Patient is agreeable to try Dexcom G6 - freestyle lubna not covered by insurance   spoke with Lyudmila Plascencia at [facility] [phone]  ID# [phone]

## 2022-06-14 ENCOUNTER — TELEPHONE (OUTPATIENT)
Dept: FAMILY MEDICINE CLINIC | Facility: CLINIC | Age: 37
End: 2022-06-14

## 2022-06-14 NOTE — TELEPHONE ENCOUNTER
[id number] is approved by [facility]  From 6/13/2022 through 6/13/2023

## 2022-06-17 ENCOUNTER — TELEPHONE (OUTPATIENT)
Dept: FAMILY MEDICINE CLINIC | Facility: CLINIC | Age: 37
End: 2022-06-17

## 2022-06-17 DIAGNOSIS — E11.9 TYPE 2 DIABETES MELLITUS WITHOUT COMPLICATION, WITHOUT LONG-TERM CURRENT USE OF INSULIN (HCC): Primary | ICD-10-CM

## 2022-06-17 DIAGNOSIS — E11.9 TYPE 2 DIABETES MELLITUS WITHOUT COMPLICATION, WITHOUT LONG-TERM CURRENT USE OF INSULIN (HCC): ICD-10-CM

## 2022-06-17 RX ORDER — PROCHLORPERAZINE 25 MG/1
1 SUPPOSITORY RECTAL DAILY
Qty: 1 EACH | Refills: 0 | Status: CANCELLED | OUTPATIENT
Start: 2022-06-17

## 2022-06-17 RX ORDER — PROCHLORPERAZINE 25 MG/1
SUPPOSITORY RECTAL
Qty: 1 EACH | Refills: 3 | Status: SHIPPED | OUTPATIENT
Start: 2022-06-17

## 2022-06-17 RX ORDER — BLOOD-GLUCOSE TRANSMITTER
1 EACH MISCELLANEOUS 2 TIMES DAILY
Qty: 1 EACH | Refills: 0 | Status: SHIPPED | OUTPATIENT
Start: 2022-06-17

## 2022-06-17 NOTE — TELEPHONE ENCOUNTER
She has what she needs for the dexcon, the only thing that did not go through is the transmitter  She is unsure as to what she needs to do to get it

## 2022-06-21 ENCOUNTER — TELEMEDICINE (OUTPATIENT)
Dept: PSYCHIATRY | Facility: CLINIC | Age: 37
End: 2022-06-21

## 2022-06-21 DIAGNOSIS — F41.1 GENERALIZED ANXIETY DISORDER: Primary | ICD-10-CM
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
## 2022-06-22 ENCOUNTER — DOCUMENTATION (OUTPATIENT)
Dept: FAMILY MEDICINE CLINIC | Facility: CLINIC | Age: 37
End: 2022-06-22

## 2022-06-27 DIAGNOSIS — I82.4Y9 DEEP VEIN THROMBOSIS (DVT) OF PROXIMAL LOWER EXTREMITY, UNSPECIFIED CHRONICITY, UNSPECIFIED LATERALITY (HCC): ICD-10-CM

## 2022-06-28 RX ORDER — WARFARIN SODIUM 10 MG/1
10 TABLET ORAL DAILY
Qty: 30 TABLET | Refills: 2 | Status: SHIPPED | OUTPATIENT
Start: 2022-06-28

## 2022-06-30 ENCOUNTER — TELEMEDICINE (OUTPATIENT)
Dept: PSYCHIATRY | Facility: CLINIC | Age: 37
End: 2022-06-30

## 2022-06-30 DIAGNOSIS — F41.1 GENERALIZED ANXIETY DISORDER: Primary | ICD-10-CM

## 2022-06-30 NOTE — PSYCH
Problem List Items Addressed This Visit    None     Visit Diagnoses     Generalized anxiety disorder    -  Primary        Length of time in session: 38 minutes, follow up in 2 week    Psychotherapy Provided: Individual    Goals addressed in session: Goal 1    Pain: none    Current suicide risk: [place]: low    Data: Met with Morales Chapman for scheduled individual session  Topics discussed included mood regulation and symptoms  Angelica reports that over the past two weeks she has felt more in control of her emotions and her ability to stop her anxiety from spiraling  Angelica stated "you told me about brushing my teeth on the other side of my mouth and muscle memory and I went home that night and tried to brush my teeth on the other side and recognized how weird it felt but how doable it was too, this made me feel like I can do the same thing with my thoughts"  "I have been catching myself over the past week recognizing that I I felt anxious or depressed and I have been able to change the direction of this"  "I was also able to not  the phone which is very not like me and not obsess on that person being upset with me for not answering the phone"  Morales Ari shows evidence of utilizing CBT thought awareness to manage mental health symptoms  During this session, this clinical used the following therapeutic modalities Supportive psychotherapy, CBT techniques and Behavior modification  Assessment:  Angelica Hdez presents with a happy mood  Angelica Nolen affect is normal range and intensity and appropriate  she exhibits growing therapeutic rapport with this clinician  she continues to exhibit willingness to work on treatment goals and objectives  Morales Chapman presents with a low risk of suicide, low risk of self-harm and minimal of harm to others  Plan:  Morales Chapman will return in 1 week for the next scheduled session    Between sessions, she will work on the CBT reframe "what evidence do I have for this thought" to catch herself from getting worked up on the little things and will report back during the next session re: success and barriers  Also Angelica reports that she is going to follow up with her GYN regarding mood and menstrual cycle  At the next session, this clinical will use CBT techniques to address negative think patterns, in effort to assist [address] with meeting treatment goals  Behavior Health Treatment Plan [facility]: Diagnosis and treatment plan explained to [address], she relates understanding and is agreeable to Treatment Plan: yes    Virtual Regular Visit    Verification of patient location:    Patient is located in the following state in which I hold an active license [place]      Assessment/Plan:    Problem List Items Addressed This Visit    None     Visit Diagnoses     Generalized anxiety disorder    -  Primary          Goals addressed in session: Goal 1          Reason for visit is No chief complaint on file  Encounter provider Clover Moffett LCSW    Provider located at [address] [place] [id number] [phone]      Recent Visits  No visits were found meeting these conditions  Showing recent visits within past 7 days and meeting all other requirements  Today's Visits  Date Type Provider Dept   06/30/22 Telemedicine Clover Moffett LCSW [facility]   Showing today's visits and meeting all other requirements  Future Appointments  No visits were found meeting these conditions  Showing future appointments within next 150 days and meeting all other requirements       The patient was identified by name and date of birth  Светлана Franco was informed that this is a telemedicine visit and that the [facility] and patient was informed this is a secure, HIPAA-complaint platform  She agrees to proceed     My office door was closed  No one else was in the room  She acknowledged consent and understanding of privacy and security of the video platform  The patient has agreed to participate and understands they can discontinue the visit at any time  Patient is aware this is a billable service  Adan Spencer is a 40 y o  female    HPI     Past Medical History:   Diagnosis Date    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[facility] )     Epistaxis     Factor V Leiden mutation ([facility] )     Herpes zoster     Hypotension     Menorrhagia     Morbid obesity with BMI of 45 0-49 9, adult ([place] [address] ) 8/5/2019       Past Surgical History:   Procedure Laterality Date    DILATION AND CURETTAGE OF UTERUS      VENA CAVA FILTER PLACEMENT      INTERRUPTION INFERIOR, GREENFIRELD FILTER PLACEMENT       Current Outpatient Medications   Medication Sig Dispense Refill    Blood Glucose Monitoring Suppl (OneTouch Verio Reflect) w/Device KIT Check blood sugars twice daily  Please substitute with appropriate alternative as covered by patient's insurance  Dx: E11 65 1 kit 0    Blood Glucose Monitoring Suppl MISC test one time daily      Continuous Blood Gluc  (Dexcom G6 ) GRISEL Test blood sugar 2-3 times a day 1 each 0    Continuous Blood Gluc Sensor (Dexcom G6 Sensor) MISC Test blood sugar 2-3 times a day 3 each 3    Continuous Blood Gluc Transmit (Dexcom G6 Transmitter) MISC Use 1 Units 2 (two) times a day 1 each 0    Continuous Blood Gluc Transmit (Dexcom G6 Transmitter) MISC Test blood sugar 2-3 times a day 1 each 3    Dulaglutide 3 MG/0 5ML SOPN Inject 0 5 mL (3 mg total) under the skin once a week 2 mL 2    famotidine (PEPCID) 40 MG tablet Take 1 tablet (40 mg total) by mouth daily at bedtime 90 tablet 0    FLUoxetine (PROzac) 20 mg capsule Take 1 capsule (20 mg total) by mouth daily 90 capsule 2    glucose blood (OneTouch Verio) test strip Check blood sugars twice daily   Please substitute with appropriate alternative as covered by patient's insurance  Dx: E11 65 200 each 0    glucose blood test strip test one time daily      hydrOXYzine HCL (ATARAX) 25 mg tablet TAKE 1 TABLET (25 MG TOTAL) BY MOUTH EVERY 6 (SIX) HOURS AS NEEDED FOR ANXIETY 20 tablet 0    Lancets MISC test one time daily      levocetirizine (XYZAL) 5 MG tablet Take 1 tablet (5 mg total) by mouth every evening 90 tablet 1    metFORMIN (GLUCOPHAGE-XR) 500 mg 24 hr tablet TAKE TWO TABLETS BY MOUTH 2 TIMES A DAYWITH MEALS 120 tablet 0    montelukast (SINGULAIR) 10 mg tablet Take 1 tablet (10 mg total) by mouth daily at bedtime 90 tablet 0    nystatin-triamcinolone (MYCOLOG-II) ointment APPLY TO AFFECTED AREA TWICE A DAY 30 g 0    OneTouch Delica Lancets 18E MISC Check blood sugars twice daily  Please substitute with appropriate alternative as covered by patient's insurance  Dx: E11 65 200 each 0    warfarin (COUMADIN) 10 mg tablet Take 1 tablet (10 mg total) by mouth daily 30 tablet 2     No current facility-administered medications for this visit  Allergies   Allergen Reactions    No Known Allergies     Other      seasonal       Review of Systems    Video Exam    There were no vitals filed for this visit  Physical Exam     I spent 45 minutes directly with the patient during this visit    VIRTUAL VISIT DISCLAIMER    Angelica Fontanez verbally agrees to participate in [facility]  Pt is aware that [facility] could be limited without vital signs or the ability to perform a full hands-on physical exam  Angelica Fontanez understands she or the provider may request at any time to terminate the video visit and request the patient to seek care or treatment in person

## 2022-06-30 NOTE — BH TREATMENT PLAN
Angelica Lacey  1985       Date of Initial Treatment Plan: 06/30/2022  Date of Current Treatment Plan: 06/30/22    Treatment Plan Number 1     Strengths/Personal Resources for Self Care: strong support system, employed, able to seek out help    Diagnosis:   1  Generalized anxiety disorder         Area of Needs: manage negative cognitions (thoughts) and emotional distress tolerance for anxiety       Long Term Goal 1: "to think more positive, not think the worst of situations or put so much into caring about other people"  "I want to stop thinking the worst, to be able to think more positive in a sitaution and stop caring too much"  Target Date: 06/30/2023  Completion Date: to be determined         Short Term Objectives for Goal 1:     1  Clinician will educate Roger Moura on skills to reduce anxiety such as but not limited to: DBT mindfulness practices, self soothing practices, CBT / REBT cognitive reframing, awareness of physical sensations from emotions (emotional intelligence), EMDR Imagery   2  Clinician will process with Angelica on an ongoing basis events that have triggered anxiety or emotions of discomfort and the thought patterns that she experienced leading up to and after the event  Clinician will consistently encourage Angelica and assist where needed in reframing her thoughts   3  Roger Moura will utilize 1 new skill between sessions and report barriers and successes to alterations in anxiety and emotional management       GOAL 1: Modality: Individual 2x per month   Completion Date to be determined and The person(s) responsible for carrying out the plan is  Angelica and [address]: Diagnosis and Treatment Plan explained to Chanel Vogel relates understanding diagnosis and is agreeable to Treatment Plan         Client Comments : Please share your thoughts, feelings, need and/or experiences regarding your treatment plan: "I think this sounds good for right now"    Angelica Adebayo Lacey, 1985, actively participated in the creation of this treatment plan during a virtual session, using the [address] [place] [address] Street  Jamimilton Nereidatheresa  provided verbal consent on 6/30/2022 at 3:38 PM  The treatment plan was transcribed into the [employer] at a later time

## 2022-07-13 ENCOUNTER — APPOINTMENT (OUTPATIENT)
Dept: LAB | Age: 37
End: 2022-07-13
Payer: COMMERCIAL

## 2022-07-14 ENCOUNTER — TELEMEDICINE (OUTPATIENT)
Dept: PSYCHIATRY | Facility: CLINIC | Age: 37
End: 2022-07-14
Payer: COMMERCIAL

## 2022-07-14 ENCOUNTER — TELEPHONE (OUTPATIENT)
Dept: FAMILY MEDICINE CLINIC | Facility: CLINIC | Age: 37
End: 2022-07-14

## 2022-07-14 DIAGNOSIS — F41.1 GENERALIZED ANXIETY DISORDER: Primary | ICD-10-CM

## 2022-07-14 PROCEDURE — 90834 PSYTX W PT 45 MINUTES: CPT | Performed by: SOCIAL WORKER

## 2022-07-14 NOTE — PSYCH
Problem List Items Addressed This Visit    None     Visit Diagnoses     Generalized anxiety disorder    -  Primary        more in control of her emotions and her ability to stop her anxiety from spiraling    Between sessions, she will work on the CBT reframe "what evidence do I have for this thought" to catch herself from getting worked up on the little things     Also Angelica reports that she is going to follow up with her GYN regarding mood and menstrual cycle  Length of time in session: 45 minutes, follow up in 2   week    Psychotherapy Provided: Individual    Goals addressed in session: Goal 1    Pain: none reported     Current suicide risk: [place]: low    Data: Met with Danielesherwin Bhagat for scheduled individual session  Topics discussed included physical health concerns and mood regulation and symptoms  Daniele Bhagat reports that she got a reward for an outstanding job done at work  Angelica reports that she has been doing really well  Angelica reports that she has been experiencing anxiety relating to her menstrual cycle  She reports that some of her negative thinking is coming in regarding going into the appointment  Angelica stated "I thought for a second that I would be pregnant, then I think cancer,compaissons of others experiences"  Angelica reports that she then goes into the "what if's" and that she will then experience anxiety  Angelica talked about her gastric bypass that this has been suggested to her and her adamant response that "no" she does not want it  Clinician attempted to grasp her intrinsic motivation and asked Angelica about her emotions towards the gastric bypass and then asking her to set a goal that feels attainable to her  She stated "I think that if I just start by not taking double portions this would be good"  Clinician informed and reminded angelica that it is more about successes than failures  Clinician encouraged Angelica to celebrate her success and if she struggles to pick herself back up form this    Angelica was engaging in self sabotage with her thought patterns by evidence of "I am sensitive to criticism" and then stating "I wait for my  to get on my about my food choices to make change"  Angelica was receptive to clinician calling her out on the self sabotage behaviors  Ramesh Salinas shows evidence of utilizing CBT thought record to manage mental health symptoms  During this session, this clinical used the following therapeutic modalities CBT techniques, Motivations interviewing and Behavior modification  Assessment:  Angelica Deluca presents with a decreased range mood  Angelica Nolen affect is normal range and intensity and appropriate  she exhibits good therapeutic rapport with this clinician  she continues to exhibit willingness to work on treatment goals and objectives  Ramesh Salinas is showing progress in treatment by utilizing awareness of thought distortions when it pertained to her anxiety and reframing  Ramesh Salinas continues to struggle with anxiety, avoidance and struggles with eating healthy  Ramesh Salinas presents with a low risk of suicide, low risk of self-harm and minimal of harm to others  Plan:  Ramesh Salinas will return in 2 weeks for the next scheduled session  Between sessions, she will work on eating single portions and adding in a glass of beverage before meals as well as making GYN appoitnemtn and will report back during the next session re: success and barriers  At the next session, this clinical will use Supportive psychotherapy, CBT techniques, Motivations interviewing and Behavior modification to address anxiety and sabotage patterns, in effort to assist Ramesh Salinas with meeting treatment goals        [address]: Diagnosis and treatment plan explained to Ramesh Salinas, she relates understanding and is agreeable to Treatment Plan: yes    Virtual Regular Visit    Verification of patient location:    Patient is located in the following state in which I hold an active license [place]      Assessment/Plan:    Problem List Items Addressed This Visit    None     Visit Diagnoses     Generalized anxiety disorder    -  Primary          Goals addressed in session: Goal 1          Reason for visit is No chief complaint on file  Encounter provider Hersey Cogan, LCSW    Provider located at [address] [place] [address] [phone]      Recent Visits  No visits were found meeting these conditions  Showing recent visits within past 7 days and meeting all other requirements  Today's Visits  Date Type Provider Dept   07/14/22 Telemedicine Hersey Cogan, LCSW [facility]   Showing today's visits and meeting all other requirements  Future Appointments  No visits were found meeting these conditions  Showing future appointments within next 150 days and meeting all other requirements       The patient was identified by name and date of birth  Nicodung Tijerina was informed that this is a telemedicine visit and that the visit is being conducted through[employer] Embedded and patient was informed this is a secure, HIPAA-complaint platform  She agrees to proceed     My office door was closed  No one else was in the room  She acknowledged consent and understanding of privacy and security of the video platform  The patient has agreed to participate and understands they can discontinue the visit at any time  Patient is aware this is a billable service  Katt Caemjo is a 40 y o  female          HPI     Past Medical History:   Diagnosis Date    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[facility] )     Epistaxis     Factor V Leiden mutation ([facility] )     Herpes zoster     Hypotension     Menorrhagia     Morbid obesity with BMI of 45 0-49 9, adult ([facility] ) 8/5/2019       Past Surgical History:   Procedure Laterality Date    DILATION AND CURETTAGE OF UTERUS      VENA CAVA FILTER PLACEMENT      INTERRUPTION INFERIOR, GREENFIRELD FILTER PLACEMENT       Current Outpatient Medications   Medication Sig Dispense Refill    Blood Glucose Monitoring Suppl (OneTouch Verio Reflect) w/Device KIT Check blood sugars twice daily  Please substitute with appropriate alternative as covered by patient's insurance  Dx: E11 65 1 kit 0    Blood Glucose Monitoring Suppl MISC test one time daily      Continuous Blood Gluc  (Dexcom G6 ) GRISEL Test blood sugar 2-3 times a day 1 each 0    Continuous Blood Gluc Sensor (Dexcom G6 Sensor) MISC Test blood sugar 2-3 times a day 3 each 3    Continuous Blood Gluc Transmit (Dexcom G6 Transmitter) MISC Use 1 Units 2 (two) times a day 1 each 0    Continuous Blood Gluc Transmit (Dexcom G6 Transmitter) MISC Test blood sugar 2-3 times a day 1 each 3    Dulaglutide 3 MG/0 5ML SOPN Inject 0 5 mL (3 mg total) under the skin once a week 2 mL 2    famotidine (PEPCID) 40 MG tablet Take 1 tablet (40 mg total) by mouth daily at bedtime 90 tablet 0    FLUoxetine (PROzac) 20 mg capsule Take 1 capsule (20 mg total) by mouth daily 90 capsule 2    glucose blood (OneTouch Verio) test strip Check blood sugars twice daily  Please substitute with appropriate alternative as covered by patient's insurance   Dx: E11 65 200 each 0    glucose blood test strip test one time daily      hydrOXYzine HCL (ATARAX) 25 mg tablet TAKE 1 TABLET (25 MG TOTAL) BY MOUTH EVERY 6 (SIX) HOURS AS NEEDED FOR ANXIETY 20 tablet 0    Lancets MISC test one time daily      levocetirizine (XYZAL) 5 MG tablet Take 1 tablet (5 mg total) by mouth every evening 90 tablet 1    metFORMIN (GLUCOPHAGE-XR) 500 mg 24 hr tablet TAKE TWO TABLETS BY MOUTH 2 TIMES A DAYWITH MEALS 120 tablet 0    montelukast (SINGULAIR) 10 mg tablet Take 1 tablet (10 mg total) by mouth daily at bedtime 90 tablet 0    nystatin-triamcinolone (MYCOLOG-II) ointment APPLY TO AFFECTED AREA TWICE A DAY 30 g 0    OneTouch Delica Lancets 60D MISC Check blood sugars twice daily  Please substitute with appropriate alternative as covered by patient's insurance  Dx: E11 65 200 each 0    warfarin (COUMADIN) 10 mg tablet Take 1 tablet (10 mg total) by mouth daily 30 tablet 2     No current facility-administered medications for this visit  Allergies   Allergen Reactions    No Known Allergies     Other      seasonal       Review of Systems    Video Exam    There were no vitals filed for this visit  Physical Exam     I spent 45 minutes directly with the patient during this visit    VIRTUAL VISIT DISCLAIMER    Angelica Christian verbally agrees to participate in [employer]  Pt is aware that [employer] could be limited without vital signs or the ability to perform a full hands-on physical exam  Angelica Christian understands she or the provider may request at any time to terminate the video visit and request the patient to seek care or treatment in person

## 2022-07-15 ENCOUNTER — TELEPHONE (OUTPATIENT)
Dept: FAMILY MEDICINE CLINIC | Facility: CLINIC | Age: 37
End: 2022-07-15

## 2022-07-25 DIAGNOSIS — E11.9 TYPE 2 DIABETES MELLITUS WITHOUT COMPLICATION, WITHOUT LONG-TERM CURRENT USE OF INSULIN (HCC): ICD-10-CM

## 2022-07-25 DIAGNOSIS — J30.2 SEASONAL ALLERGIES: ICD-10-CM

## 2022-07-25 RX ORDER — METFORMIN HYDROCHLORIDE 500 MG/1
TABLET, EXTENDED RELEASE ORAL
Qty: 120 TABLET | Refills: 0 | Status: SHIPPED | OUTPATIENT
Start: 2022-07-25 | End: 2022-08-29 | Stop reason: SDUPTHER

## 2022-07-26 RX ORDER — MONTELUKAST SODIUM 10 MG/1
TABLET ORAL
Qty: 90 TABLET | Refills: 0 | Status: SHIPPED | OUTPATIENT
Start: 2022-07-26 | End: 2022-09-14 | Stop reason: SDUPTHER

## 2022-08-08 ENCOUNTER — TELEPHONE (OUTPATIENT)
Dept: OBGYN CLINIC | Facility: CLINIC | Age: 37
End: 2022-08-08

## 2022-08-08 NOTE — TELEPHONE ENCOUNTER
Spoke with patient, states periods have been off as has her mood  Does have non hormonal IUD but interested in removal  Patient states has to have birth control because cannot get pregnant  Apt offered and scheduled

## 2022-08-11 ENCOUNTER — TELEMEDICINE (OUTPATIENT)
Dept: PSYCHIATRY | Facility: CLINIC | Age: 37
End: 2022-08-11
Payer: COMMERCIAL

## 2022-08-11 DIAGNOSIS — F41.1 GENERALIZED ANXIETY DISORDER: Primary | ICD-10-CM

## 2022-08-11 PROCEDURE — 90834 PSYTX W PT 45 MINUTES: CPT | Performed by: SOCIAL WORKER

## 2022-08-11 NOTE — PSYCH
Problem List Items Addressed This Visit    None     Visit Diagnoses     Generalized anxiety disorder    -  Primary          Length of time in session: 45 minutes, follow up in 2   week    Psychotherapy Provided: Individual    Goals addressed in session: Goal 1    Pain: none reported    Current suicide risk: Delmer Gaytan: low    Data: Met with Sissy Martinez for scheduled individual session  Topics discussed included anxiety and excessive worry  Sissy Stade reports that overall she feels her anxiety has not been daily but that she is still experiencing it in bouts  She reports that one day on vacation last week she was just tearful and had no idea why or where it came from  Angelica was proud of herself and shared that she has scheduled an appointment with her diabetes doctor and that she also scheduled an appointment for her annual in November and an appointment Wednesday to speak about her IUD  Historically Angelica was dx with diabetes in 2015  Her first episode of anxiety was in 2013  She reports "I was over 300 pounds then and probably had diabetes then just no one knew "  Angelica has seen a nutritionist and reports that there only suggestion was for gastric surgeries and that they would not stop talking about this and that she stopped going for this reason  Clinician highly encouraged assertive communication and that Angelica request another referral to nutritionist and that this time she use her assertive communication style and advocate that she is not interested in the weight loss surgeries at this current time but still seek this care out so that she can learn more about whet triggers and fluctuates her blood sugars  Clinician voiced concern over her fluctuations in mood being linked to her blood sugars  Sissy Geovannade shows evidence of utilizing weighing pros and cons and emotion regulation skills to manage mental health symptoms    During this session, this clinical used the following therapeutic modalities Supportive psychotherapy, CBT techniques and Behavior modification  Assessment:  Angelica Jiménez presents with a anxious mood  Angelica Nolen affect is normal range and intensity and appropriate  she exhibits good therapeutic rapport with this clinician  she continues to exhibit willingness to work on treatment goals and objectives  Yashira Deluna is showing progress in treatment by utilizing reframe "what evidence do I have"  Yashira Deluna continues to struggle with sudden onset of anxiety  Yashira Deluna presents with a low risk of suicide, low risk of self-harm and minimal of harm to others  Plan:  Yashira Deluna will return in 2 for the next scheduled session  Between sessions, she will attend follow up appointments and will report back during the next session re: success and barriers  At the next session, this clinical will use Supportive psychotherapy and CBT techniques to address anxiety, in effort to assist Yashira Deluna with meeting treatment goals  Clinician will approach and begin work on self sabotage patterns    [address]: Diagnosis and treatment plan explained to Yashira Deluna, she relates understanding and is agreeable to Treatment Plan: yes    Virtual Regular Visit    Verification of patient location:    Patient is located in the following state in which I hold an active license [place]      Assessment/Plan:    Problem List Items Addressed This Visit    None     Visit Diagnoses     Generalized anxiety disorder    -  Primary          Goals addressed in session: Goal 1          Reason for visit is No chief complaint on file  Encounter provider Javier Ornelas LCSW    Provider located at [address] [place] [address] Christopher Batsheva Cruz [address] [id number] [phone]      Recent Visits  No visits were found meeting these conditions    Showing recent visits within past 7 days and meeting all other requirements  Today's Visits  Date Type Provider Dept   08/11/22 [facility]medicine [facility]   Showing today's visits and meeting all other requirements  Future Appointments  No visits were found meeting these conditions  Showing future appointments within next 150 days and meeting all other requirements       The patient was identified by name and date of birth  Andrade Hui was informed that this is a telemedicine visit and that the visit is being conducted throughEpic Embedded and patient was informed this is a secure, HIPAA-complaint platform  She agrees to proceed     My office door was closed  No one else was in the room  She acknowledged consent and understanding of privacy and security of the video platform  The patient has agreed to participate and understands they can discontinue the visit at any time  Patient is aware this is a billable service  Cami Barrera is a 40 y o  female    HPI     Past Medical History:   Diagnosis Date    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[facility] )     Epistaxis     Factor V Leiden mutation ([facility] )     Herpes zoster     Hypotension     Menorrhagia     Morbid obesity with BMI of 45 0-49 9, adult ([facility] ) 8/5/2019       Past Surgical History:   Procedure Laterality Date    DILATION AND CURETTAGE OF UTERUS      VENA CAVA FILTER PLACEMENT      INTERRUPTION INFERIOR, GREENFIRELD FILTER PLACEMENT       Current Outpatient Medications   Medication Sig Dispense Refill    Blood Glucose Monitoring Suppl (OneTouch Verio Reflect) w/Device KIT Check blood sugars twice daily  Please substitute with appropriate alternative as covered by patient's insurance   Dx: E11 65 1 kit 0    Blood Glucose Monitoring Suppl MISC test one time daily      Continuous Blood Gluc  (Dexcom G6 ) GRISEL Test blood sugar 2-3 times a day 1 each 0    Continuous Blood Gluc Sensor (Dexcom G6 Sensor) MISC Test blood sugar 2-3 times a day 3 each 3    Continuous Blood Gluc Transmit (Dexcom G6 Transmitter) MISC Use 1 Units 2 (two) times a day 1 each 0    Continuous Blood Gluc Transmit (Dexcom G6 Transmitter) MISC Test blood sugar 2-3 times a day 1 each 3    Dulaglutide 3 MG/0 5ML SOPN Inject 0 5 mL (3 mg total) under the skin once a week 2 mL 2    famotidine (PEPCID) 40 MG tablet Take 1 tablet (40 mg total) by mouth daily at bedtime 90 tablet 0    FLUoxetine (PROzac) 20 mg capsule Take 1 capsule (20 mg total) by mouth daily 90 capsule 2    glucose blood (OneTouch Verio) test strip Check blood sugars twice daily  Please substitute with appropriate alternative as covered by patient's insurance  Dx: E11 65 200 each 0    glucose blood test strip test one time daily      hydrOXYzine HCL (ATARAX) 25 mg tablet TAKE 1 TABLET (25 MG TOTAL) BY MOUTH EVERY 6 (SIX) HOURS AS NEEDED FOR ANXIETY 20 tablet 0    Lancets MISC test one time daily      levocetirizine (XYZAL) 5 MG tablet Take 1 tablet (5 mg total) by mouth every evening 90 tablet 1    metFORMIN (GLUCOPHAGE-XR) 500 mg 24 hr tablet TAKE TWO TABLETS BY MOUTH 2 TIMES A DAY WITH MEALS 120 tablet 0    montelukast (SINGULAIR) 10 mg tablet TAKE ONE TABLET BY MOUTH AT BEDTIME 90 tablet 0    nystatin-triamcinolone (MYCOLOG-II) ointment APPLY TO AFFECTED AREA TWICE A DAY 30 g 0    OneTouch Delica Lancets 12T MISC Check blood sugars twice daily  Please substitute with appropriate alternative as covered by patient's insurance  Dx: E11 65 200 each 0    warfarin (COUMADIN) 10 mg tablet Take 1 tablet (10 mg total) by mouth daily 30 tablet 2     No current facility-administered medications for this visit  Allergies   Allergen Reactions    No Known Allergies     Other      seasonal       Review of Systems    Video Exam    There were no vitals filed for this visit      Physical Exam     I spent 45 minutes directly with the patient during this visit

## 2022-08-16 ENCOUNTER — OFFICE VISIT (OUTPATIENT)
Dept: FAMILY MEDICINE CLINIC | Facility: CLINIC | Age: 37
End: 2022-08-16
Payer: COMMERCIAL

## 2022-08-16 VITALS
OXYGEN SATURATION: 94 % | WEIGHT: 276 LBS | BODY MASS INDEX: 45.98 KG/M2 | SYSTOLIC BLOOD PRESSURE: 110 MMHG | HEART RATE: 84 BPM | TEMPERATURE: 97.1 F | HEIGHT: 65 IN | DIASTOLIC BLOOD PRESSURE: 68 MMHG

## 2022-08-16 DIAGNOSIS — Z79.4 TYPE 2 DIABETES MELLITUS TREATED WITH INSULIN (HCC): ICD-10-CM

## 2022-08-16 DIAGNOSIS — E66.01 MORBID OBESITY WITH BMI OF 45.0-49.9, ADULT (HCC): ICD-10-CM

## 2022-08-16 DIAGNOSIS — Z00.00 ANNUAL PHYSICAL EXAM: Primary | ICD-10-CM

## 2022-08-16 DIAGNOSIS — E11.9 TYPE 2 DIABETES MELLITUS TREATED WITH INSULIN (HCC): ICD-10-CM

## 2022-08-16 LAB — SL AMB POCT HEMOGLOBIN AIC: 10.3 (ref ?–6.5)

## 2022-08-16 PROCEDURE — 99395 PREV VISIT EST AGE 18-39: CPT | Performed by: NURSE PRACTITIONER

## 2022-08-16 PROCEDURE — 83036 HEMOGLOBIN GLYCOSYLATED A1C: CPT | Performed by: NURSE PRACTITIONER

## 2022-08-16 NOTE — PROGRESS NOTES
BMI Counseling: Body mass index is 45 93 kg/m²  The BMI is above normal  Nutrition recommendations include reducing portion sizes, decreasing overall calorie intake, 3-5 servings of fruits/vegetables daily, reducing fast food intake, consuming healthier snacks, decreasing soda and/or juice intake and moderation in carbohydrate intake  Exercise recommendations include exercising 3-5 times per week  ADULT ANNUAL PHYSICAL  [employer]    NAME: Paul Kilpatrick  AGE: 40 y o  SEX: female  : 1985     DATE: 2022     Assessment and Plan:     Problem List Items Addressed This Visit        Other    Morbid obesity with BMI of 45 0-49 9, adult ([facility] )      Other Visit Diagnoses     Annual physical exam    -  Primary    Type 2 diabetes mellitus treated with insulin ([facility] )        Relevant Medications    Empagliflozin (Jardiance) 10 MG TABS    Other Relevant Orders    Ambulatory Referral to Diabetic Education          Immunizations and preventive care screenings were discussed with patient today  Appropriate education was printed on patient's after visit summary  Counseling:  Alcohol/drug use: discussed moderation in alcohol intake, the recommendations for healthy alcohol use, and avoidance of illicit drug use  Dental Health: discussed importance of regular tooth brushing, flossing, and dental visits  Injury prevention: discussed safety/seat belts, safety helmets, smoke detectors, carbon dioxide detectors, and smoking near bedding or upholstery  Sexual health: discussed sexually transmitted diseases, partner selection, use of condoms, avoidance of unintended pregnancy, and contraceptive alternatives  · Exercise: the importance of regular exercise/physical activity was discussed  Recommend exercise 3-5 times per week for at least 30 minutes            Return in about 3 months (around 2022) for Next scheduled follow up Dr Vane Pugh      Chief Complaint:     Chief Complaint   Patient presents with    Annual Exam     3 month       History of Present Illness:     Adult Annual Physical   Patient here for a comprehensive physical exam  The patient reports problems - anxiety at times    Having IUD issues     Seeing gyn    Has counselor she speaks with   Romy Chisholm Diet and Physical Activity  · Diet/Nutrition: diabetic diet and limited fruits/vegetables  · Exercise: no formal exercise  Depression Screening  PHQ-2/9 Depression Screening    Little interest or pleasure in doing things: 0 - not at all  Feeling down, depressed, or hopeless: 0 - not at all  PHQ-2 Score: 0  PHQ-2 Interpretation: Negative depression screen       General Health  · Sleep: sleeps well  · Hearing: normal - bilateral   · Vision: no vision problems  · Dental: regular dental visits  /GYN Health  · Last menstrual period: seeing gyn  · Contraceptive method: IUD placement  · History of STDs?: no      Review of Systems:     Review of Systems   Constitutional: Negative for activity change, appetite change, chills and fever  HENT: Negative for congestion, ear pain, rhinorrhea and sore throat  Eyes: Negative for pain and visual disturbance  Respiratory: Negative for cough, chest tightness and shortness of breath  Cardiovascular: Negative for chest pain and palpitations  Gastrointestinal: Negative for abdominal pain, constipation, diarrhea, nausea and vomiting  Genitourinary: Negative for dysuria, hematuria and urgency  Musculoskeletal: Negative for arthralgias and back pain  Skin: Negative for color change and rash  Neurological: Negative for dizziness, seizures, syncope, light-headedness, numbness and headaches  Psychiatric/Behavioral: The patient is nervous/anxious  All other systems reviewed and are negative       Past Medical History:     Past Medical History:   Diagnosis Date    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 )     Epistaxis     Factor V Leiden mutation ([facility] )     Herpes zoster     Hypotension     Menorrhagia     Morbid obesity with BMI of 45 0-49 9, adult ([facility] 2019      Past Surgical History:     Past Surgical History:   Procedure Laterality Date    DILATION AND CURETTAGE OF UTERUS      VENA CAVA FILTER PLACEMENT      INTERRUPTION INFERIOR, GREENFIRELD FILTER PLACEMENT      Social History:     Social History     Socioeconomic History    Marital status: /Civil Union     Spouse name: None    Number of children: None    Years of education: None    Highest education level: None   Occupational History    Occupation:      Employer: [employer]   Tobacco Use    Smoking status: Former Smoker     Packs/day: 0 50     Years: 5 00     Pack years: 2 50     Types: Cigarettes     Start date: 10/9/2015     Quit date: 2018     Years since quittin 6    Smokeless tobacco: Former User     Quit date: 2018    Tobacco comment: TOBACCO USE   Vaping Use    Vaping Use: Never used   Substance and Sexual Activity    Alcohol use: Yes     Alcohol/week: 0 0 standard drinks     Comment: rarely    Drug use: No    Sexual activity: Yes     Partners: Male     Birth control/protection: I U D     Other Topics Concern    None   Social History Narrative    None     Social Determinants of Health     Financial Resource Strain: Not on file   Food Insecurity: Not on file   Transportation Needs: Not on file   Physical Activity: Not on file   Stress: Not on file   Social Connections: Not on file   Intimate Partner Violence: Not on file   Housing Stability: Not on file      Family History:     Family History   Problem Relation Age of Onset    Diabetes Father     Diabetes type II Father     Cancer Family     Stroke Family     Heart disease Family     Hypercoagulant Ability Family     Factor V Leiden deficiency Family         MUTATION      Current Medications:     Current Outpatient Medications   Medication Sig Dispense Refill    Empagliflozin (Jardiance) 10 MG TABS Take 1 tablet (10 mg total) by mouth in the morning 90 tablet 1    Blood Glucose Monitoring Suppl (OneTouch Verio Reflect) w/Device KIT Check blood sugars twice daily  Please substitute with appropriate alternative as covered by patient's insurance  Dx: E11 65 1 kit 0    Blood Glucose Monitoring Suppl MISC test one time daily      Continuous Blood Gluc  (Dexcom G6 ) GRISEL Test blood sugar 2-3 times a day 1 each 0    Continuous Blood Gluc Sensor (Dexcom G6 Sensor) MISC Test blood sugar 2-3 times a day 3 each 3    Continuous Blood Gluc Transmit (Dexcom G6 Transmitter) MISC Use 1 Units 2 (two) times a day 1 each 0    Continuous Blood Gluc Transmit (Dexcom G6 Transmitter) MISC Test blood sugar 2-3 times a day 1 each 3    Dulaglutide 3 MG/0 5ML SOPN Inject 0 5 mL (3 mg total) under the skin once a week 2 mL 2    famotidine (PEPCID) 40 MG tablet Take 1 tablet (40 mg total) by mouth daily at bedtime 90 tablet 0    FLUoxetine (PROzac) 20 mg capsule Take 1 capsule (20 mg total) by mouth daily 90 capsule 2    glucose blood (OneTouch Verio) test strip Check blood sugars twice daily  Please substitute with appropriate alternative as covered by patient's insurance  Dx: E11 65 200 each 0    glucose blood test strip test one time daily      hydrOXYzine HCL (ATARAX) 25 mg tablet TAKE 1 TABLET (25 MG TOTAL) BY MOUTH EVERY 6 (SIX) HOURS AS NEEDED FOR ANXIETY 20 tablet 0    Lancets MISC test one time daily      levocetirizine (XYZAL) 5 MG tablet Take 1 tablet (5 mg total) by mouth every evening 90 tablet 1    metFORMIN (GLUCOPHAGE-XR) 500 mg 24 hr tablet TAKE TWO TABLETS BY MOUTH 2 TIMES A DAY WITH MEALS 120 tablet 0    montelukast (SINGULAIR) 10 mg tablet TAKE ONE TABLET BY MOUTH AT BEDTIME 90 tablet 0    nystatin-triamcinolone (MYCOLOG-II) ointment APPLY TO AFFECTED AREA TWICE A DAY 30 g 0    OneTouch Delica Lancets 68F MISC Check blood sugars twice daily   Please substitute with appropriate alternative as covered by patient's insurance  Dx: E11 65 200 each 0    warfarin (COUMADIN) 10 mg tablet Take 1 tablet (10 mg total) by mouth daily 30 tablet 2     No current facility-administered medications for this visit  Allergies: Allergies   Allergen Reactions    No Known Allergies     Other      seasonal      Physical Exam:     /68 (BP Location: Left arm, Patient Position: Sitting, Cuff Size: Large)   Pulse 84   Temp (!) 97 1 °F (36 2 °C)   Ht 5' 5" (1 651 m)   Wt 125 kg (276 lb)   SpO2 94%   BMI 45 93 kg/m²     Physical Exam  Vitals and nursing note reviewed  Constitutional:       General: She is not in acute distress  Appearance: Normal appearance  She is well-developed  She is obese  HENT:      Head: Normocephalic and atraumatic  Right Ear: Tympanic membrane, ear canal and external ear normal       Left Ear: Tympanic membrane, ear canal and external ear normal       Nose: Nose normal       Mouth/Throat:      Pharynx: Oropharynx is clear  Eyes:      Conjunctiva/sclera: Conjunctivae normal       Pupils: Pupils are equal, round, and reactive to light  Cardiovascular:      Rate and Rhythm: Normal rate and regular rhythm  Pulses: Normal pulses  Heart sounds: Normal heart sounds  No murmur heard  Pulmonary:      Effort: Pulmonary effort is normal  No respiratory distress  Breath sounds: Normal breath sounds  Abdominal:      General: Bowel sounds are normal       Palpations: Abdomen is soft  Tenderness: There is no abdominal tenderness  Musculoskeletal:         General: Normal range of motion  Cervical back: Normal range of motion and neck supple  Skin:     General: Skin is warm and dry  Neurological:      General: No focal deficit present  Mental Status: She is alert and oriented to person, place, and time     Psychiatric:         Mood and Affect: Mood normal          Behavior: Behavior normal  Thought Content:  Thought content normal          Judgment: Judgment normal           DYLAN Carlson   [address]

## 2022-08-16 NOTE — PATIENT INSTRUCTIONS

## 2022-08-17 ENCOUNTER — PROCEDURE VISIT (OUTPATIENT)
Dept: OBGYN CLINIC | Facility: CLINIC | Age: 37
End: 2022-08-17
Payer: COMMERCIAL

## 2022-08-17 VITALS — HEIGHT: 65 IN | BODY MASS INDEX: 45.93 KG/M2 | DIASTOLIC BLOOD PRESSURE: 80 MMHG | SYSTOLIC BLOOD PRESSURE: 118 MMHG

## 2022-08-17 DIAGNOSIS — B37.31 VULVOVAGINITIS DUE TO YEAST: Primary | ICD-10-CM

## 2022-08-17 DIAGNOSIS — Z30.431 IUD CHECK UP: ICD-10-CM

## 2022-08-17 PROBLEM — B37.3 VULVOVAGINITIS DUE TO YEAST: Status: ACTIVE | Noted: 2022-08-17

## 2022-08-17 PROCEDURE — 99213 OFFICE O/P EST LOW 20 MIN: CPT | Performed by: OBSTETRICS & GYNECOLOGY

## 2022-08-17 RX ORDER — NYSTATIN 100000 [USP'U]/G
POWDER TOPICAL 3 TIMES DAILY
Qty: 15 G | Refills: 0 | Status: SHIPPED | OUTPATIENT
Start: 2022-08-17

## 2022-08-17 RX ORDER — NYSTATIN 100000 U/G
OINTMENT TOPICAL 2 TIMES DAILY
Qty: 30 G | Refills: 0 | Status: SHIPPED | OUTPATIENT
Start: 2022-08-17

## 2022-08-17 NOTE — ASSESSMENT & PLAN NOTE
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, patient is at high risk for bleeding and cannot have hormonal contraception with estrogen  We reviewed the progesterone only options including POPs, Depo, Nexplanon, and IUD  She is not interested in Depo and POPs and Nexplanon increase her risk of bleeding/ irregular bleeding  We discussed that Mirena IUD is a localized hormone that is safe with her condition and current medications  She was reassured that Mirena is very effective  While she was reassured that she could have tubal sterilization, given her risks of bleeding if not on birth control and risks of anesthesia and surgery given her risk of clots, we discussed that she may be better served with continuing a LARC  She would like to continue Mirena IUD at this time and plans for removal/ reinsertion next year (2023)  Tylenol 975mg q6 hours recommended for cramping  Patient reassured that there is no evidence of more concerning cause of cramping other than cramping with her cycles  Additionally, given that the hormone is local, the IUD should not effect her mood lability  The IUD is not helping emotional lability associated with menstrual cycles but it is also not the cause of her anxiety  Patient reported feeling reassured regarding this

## 2022-08-17 NOTE — ASSESSMENT & PLAN NOTE
Patient noted to have vulvovaginal candidiasis  Reviewed that uncontrolled diabetes is a risk factor  Patient unable to have Diflucan due to interaction with Coumadin  Patient reports minimal relieve with mycolog  Nystatin ointment and powder both prescribed for patient comfort with instructions for use  Encouraged tight glucose control

## 2022-08-17 NOTE — PROGRESS NOTES
Subjective:     Izzy Sevilla is a 40 y o   female who presents with concerns regarding her Mirena IUD  She had it placed in 2018  She initially had no periods after it was placed but then in , she started to have irregular bleeding "here and there"  She reports that occasionally she had cramping so bad that she considered going to the ED  She reports that the cramping was unbearable She had an US in 2021 that was normal and showed her IUD in appropriate place  She is concerned that the IUD is giving her more problems  She sees a therapist with [address] [facility]  and was started back on Prozac in   She reports emotional lability especially before her period  Right now, she bleeds for only 1-2 days  Angelica has a history of Factor V Leiden Mutation and history of DVT and PE  She is therefore on coumadin  She is aware of the risks of pregnancy and therefore desires highly effective birth control while minimizing risks of clots  She is interested in permanent sterilization as she does not want any children  She also reports that her  is older than her and he does not want any children either  She had her first blood clot in  after starting OCPs  She has diabetes that is poorly controlled and therefore wishes to avoid medications with weight gain as side effects  She reports intermittent vulvar itching  She has scratched herself so badly that she has had bleeding  She also has intermittent "boils" especially after shaving       Patient Active Problem List   Diagnosis    Anxiety state    [facility]    [facility]    Factor V Leiden mutation [facility]    Hyperlipidemia    Pulmonary embolism [facility]    Gynecologic exam normal    Vulvar itching    IUD check up    Rash    Sprain of unspecified ligament of right ankle, initial encounter    Morbid obesity with BMI of 45 0-49 9, adult ([facility])    Sensorineural hearing loss (SNHL) of both ears  Vulvovaginitis due to yeast     Past Medical History:   Diagnosis Date    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[facility] )     Epistaxis     Factor V Leiden mutation ([place] [address] )     Herpes zoster     Hypotension     Menorrhagia     Morbid obesity with BMI of 45 0-49 9, adult ([place] [address] ) 8/5/2019       Objective:    Vitals: Blood pressure 118/80, height 5' 5" (1 651 m), last menstrual period 08/06/2022  Body mass index is 45 93 kg/m²  Physical Exam  Vitals reviewed  Constitutional:       General: She is not in acute distress  Appearance: She is well-developed  She is obese  She is not ill-appearing, toxic-appearing or diaphoretic  Cardiovascular:      Rate and Rhythm: Normal rate  Pulmonary:      Effort: Pulmonary effort is normal    Genitourinary:     Exam position: Lithotomy position  Labia:         Right: No rash, tenderness, lesion or injury  Left: Rash, tenderness, lesion and injury present  Vagina: No signs of injury and foreign body  Vaginal discharge (Discharged noted at the introitus but less so in the vaginal vault) present  No erythema, tenderness, bleeding, lesions or prolapsed vaginal walls  Cervix: No cervical motion tenderness, discharge, friability, lesion, erythema, cervical bleeding or eversion  Comments: IUD strings visualized   Skin:     General: Skin is warm and dry  Neurological:      Mental Status: She is alert and oriented to person, place, and time  Psychiatric:         Behavior: Behavior normal          Assessment/Plan:    Problem List Items Addressed This Visit        Unprioritized    IUD check up     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, patient is at high risk for bleeding and cannot have hormonal contraception with estrogen  We reviewed the progesterone only options including POPs, Depo, Nexplanon, and IUD  She is not interested in Depo and POPs and Nexplanon increase her risk of bleeding/ irregular bleeding       We discussed that Mirena IUD is a localized hormone that is safe with her condition and current medications  She was reassured that Mirena is very effective  While she was reassured that she could have tubal sterilization, given her risks of bleeding if not on birth control and risks of anesthesia and surgery given her risk of clots, we discussed that she may be better served with continuing a LARC  She would like to continue Mirena IUD at this time and plans for removal/ reinsertion next year (2023)  Tylenol 975mg q6 hours recommended for cramping  Patient reassured that there is no evidence of more concerning cause of cramping other than cramping with her cycles  Additionally, given that the hormone is local, the IUD should not effect her mood lability  The IUD is not helping emotional lability associated with menstrual cycles but it is also not the cause of her anxiety  Patient reported feeling reassured regarding this  Vulvovaginitis due to yeast - Primary     Patient noted to have vulvovaginal candidiasis  Reviewed that uncontrolled diabetes is a risk factor  Patient unable to have Diflucan due to interaction with Coumadin  Patient reports minimal relieve with mycolog  Nystatin ointment and powder both prescribed for patient comfort with instructions for use  Encouraged tight glucose control            Relevant Medications    nystatin (MYCOSTATIN) ointment    nystatin (MYCOSTATIN) powder          Carmina Hummel MD  8/17/2022  4:54 PM

## 2022-08-25 ENCOUNTER — TELEMEDICINE (OUTPATIENT)
Dept: PSYCHIATRY | Facility: CLINIC | Age: 37
End: 2022-08-25
Payer: COMMERCIAL

## 2022-08-25 DIAGNOSIS — F41.1 GENERALIZED ANXIETY DISORDER: Primary | ICD-10-CM

## 2022-08-25 PROCEDURE — 90832 PSYTX W PT 30 MINUTES: CPT | Performed by: SOCIAL WORKER

## 2022-08-25 NOTE — PSYCH
Problem List Items Addressed This Visit    None     Visit Diagnoses     Generalized anxiety disorder    -  Primary          Length of time in session: 30 minutes, follow up in 2   week    Psychotherapy Provided: Individual    Goals addressed in session: Goal 1    Pain: none reported    Current suicide risk: Isra Catherine: low    Data: Met with Glasscockyusuf Crawleyings for scheduled individual session  Topics discussed included physical health concerns  Felipe Hansen reports that she is very proud of herself having attended her GYN and primary PCP appointments  Angelica reports that she was put on new medication for her diabetes that she feels is controlling it much more  She reports that her blood sugars are staying below 200 and that they have not gone up into the 400's like they were before  Angelica reports that her anxiety has been at a 1 on scale of 0-5  Angelica reports that she has made changes with her eating and that she is trying very hard to eat in moderation and to not restrict  Angelica stated "I am going to be real I am not trying to lose a lot of weight because I do not want to lose my breast and I am not purchasing new clothes"  Angelica reports that one thing that she needs to work on and address is her relationship with her mother in law and her letting go of things that need to be addressed  Felipe Hansen shows evidence of utilizing problem solving to manage mental health symptoms  During this session, this clinical used the following therapeutic modalities Supportive psychotherapy, Motivations interviewing and Solution-focused therapy  Assessment:  Angelica Regalado presents with a within normal limits mood  Angelica Nolen affect is normal range and intensity and appropriate  she exhibits strong therapeutic rapport with this clinician  she continues to exhibit willingness to work on treatment goals and objectives    Felipe Hansen is showing progress in treatment by utilizing problem solving skills and maintaince of her own health  Izzy Sevilla continues to struggle with setting boundaries and management of food intake  Izzy Sevilla presents with a low risk of suicide, low risk of self-harm and minimal of harm to others  Plan:  Izzy Sevilla will return in 2 weeks for the next scheduled session  Between sessions, she will continue with more healthy eating approaches and monitor fluctuations in her mood with her new medications that are stabilizing her sugars and will report back during the next session re: success and barriers  At the next session, this clinical will use Supportive psychotherapy, Motivations interviewing and Behavior modification to address boundaries, stabilization in emotions, in effort to assist Izzy Sevilla with meeting treatment goals  Behavior Health Treatment Plan [facility]: Diagnosis and treatment plan explained to Izzy Sevilla, she relates understanding and is agreeable to Treatment Plan: yes    Virtual Regular Visit    Verification of patient location:    Patient is located in the following state in which I hold an active license [place]      Assessment/Plan:    Problem List Items Addressed This Visit    None     Visit Diagnoses     Generalized anxiety disorder    -  Primary          Goals addressed in session: Goal 1          Reason for visit is No chief complaint on file  Encounter provider Danisha Hernandez LCSW    Provider located at [address] [place] [id number] [phone]      Recent Visits  No visits were found meeting these conditions  Showing recent visits within past 7 days and meeting all other requirements  Today's Visits  Date Type Provider Dept   08/25/22 Telemedicine Danisha Hernandez LCSW [facility]   Showing today's visits and meeting all other requirements  Future Appointments  No visits were found meeting these conditions    Showing future appointments within next 150 days and meeting all other requirements       The patient was identified by name and date of birth  Vesna Ardon was informed that this is a telemedicine visit and that the visit is being conducted throughEpic Embedded and patient was informed this is a secure, HIPAA-complaint platform  She agrees to proceed     My office door was closed  No one else was in the room  She acknowledged consent and understanding of privacy and security of the video platform  The patient has agreed to participate and understands they can discontinue the visit at any time  Patient is aware this is a billable service  Pratima Leyva is a 40 y o  female    HPI     Past Medical History:   Diagnosis Date    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     Epistaxis     Factor V Leiden mutation ([facility] )     Herpes zoster     Hypotension     Menorrhagia     Morbid obesity with BMI of 45 0-49 9, adult ([facility] ) 8/5/2019       Past Surgical History:   Procedure Laterality Date    DILATION AND CURETTAGE OF UTERUS      VENA CAVA FILTER PLACEMENT      INTERRUPTION INFERIOR, GREENFIRELD FILTER PLACEMENT       Current Outpatient Medications   Medication Sig Dispense Refill    Blood Glucose Monitoring Suppl (OneTouch Verio Reflect) w/Device KIT Check blood sugars twice daily  Please substitute with appropriate alternative as covered by patient's insurance   Dx: E11 65 1 kit 0    Blood Glucose Monitoring Suppl MISC test one time daily      Continuous Blood Gluc  (Dexcom G6 ) GRISEL Test blood sugar 2-3 times a day 1 each 0    Continuous Blood Gluc Sensor (Dexcom G6 Sensor) MISC Test blood sugar 2-3 times a day 3 each 3    Continuous Blood Gluc Transmit (Dexcom G6 Transmitter) MISC Use 1 Units 2 (two) times a day 1 each 0    Continuous Blood Gluc Transmit (Dexcom G6 Transmitter) MISC Test blood sugar 2-3 times a day 1 each 3    Dulaglutide 3 MG/0 5ML SOPN Inject 0 5 mL (3 mg total) under the skin once a week 2 mL 2    Empagliflozin (Jardiance) 10 MG TABS Take 1 tablet (10 mg total) by mouth in the morning 90 tablet 1    famotidine (PEPCID) 40 MG tablet Take 1 tablet (40 mg total) by mouth daily at bedtime 90 tablet 0    FLUoxetine (PROzac) 20 mg capsule Take 1 capsule (20 mg total) by mouth daily 90 capsule 2    glucose blood (OneTouch Verio) test strip Check blood sugars twice daily  Please substitute with appropriate alternative as covered by patient's insurance  Dx: E11 65 200 each 0    glucose blood test strip test one time daily      hydrOXYzine HCL (ATARAX) 25 mg tablet TAKE 1 TABLET (25 MG TOTAL) BY MOUTH EVERY 6 (SIX) HOURS AS NEEDED FOR ANXIETY 20 tablet 0    Lancets MISC test one time daily      levocetirizine (XYZAL) 5 MG tablet Take 1 tablet (5 mg total) by mouth every evening 90 tablet 1    metFORMIN (GLUCOPHAGE-XR) 500 mg 24 hr tablet TAKE TWO TABLETS BY MOUTH 2 TIMES A DAY WITH MEALS 120 tablet 0    montelukast (SINGULAIR) 10 mg tablet TAKE ONE TABLET BY MOUTH AT BEDTIME 90 tablet 0    nystatin (MYCOSTATIN) ointment Apply topically 2 (two) times a day Use as needed for yeast infections (itching) on the vulva (do not use with nystatin powder) 30 g 0    nystatin (MYCOSTATIN) powder Apply topically 3 (three) times a day Use powder externally as needed for yeast infections (itching) on the vulva (do not use with nystatin ointment) 15 g 0    OneTouch Delica Lancets 67N MISC Check blood sugars twice daily  Please substitute with appropriate alternative as covered by patient's insurance  Dx: E11 65 200 each 0    warfarin (COUMADIN) 10 mg tablet Take 1 tablet (10 mg total) by mouth daily 30 tablet 2     No current facility-administered medications for this visit  Allergies   Allergen Reactions    No Known Allergies     Other      seasonal       Review of Systems    Video Exam    There were no vitals filed for this visit      Physical Exam     I spent 45 minutes directly with the patient during this visit

## 2022-08-26 ENCOUNTER — PATIENT MESSAGE (OUTPATIENT)
Dept: FAMILY MEDICINE CLINIC | Facility: CLINIC | Age: 37
End: 2022-08-26

## 2022-08-26 DIAGNOSIS — J30.2 SEASONAL ALLERGIES: ICD-10-CM

## 2022-08-26 DIAGNOSIS — E11.9 TYPE 2 DIABETES MELLITUS WITHOUT COMPLICATION, WITHOUT LONG-TERM CURRENT USE OF INSULIN (HCC): ICD-10-CM

## 2022-08-26 RX ORDER — LEVOCETIRIZINE DIHYDROCHLORIDE 5 MG/1
5 TABLET, FILM COATED ORAL EVERY EVENING
Qty: 90 TABLET | Refills: 1 | Status: CANCELLED | OUTPATIENT
Start: 2022-08-26

## 2022-08-26 RX ORDER — METFORMIN HYDROCHLORIDE 500 MG/1
TABLET, EXTENDED RELEASE ORAL
Qty: 120 TABLET | Refills: 5 | Status: CANCELLED | OUTPATIENT
Start: 2022-08-26

## 2022-08-29 RX ORDER — LEVOCETIRIZINE DIHYDROCHLORIDE 5 MG/1
5 TABLET, FILM COATED ORAL EVERY EVENING
Qty: 90 TABLET | Refills: 1 | Status: SHIPPED | OUTPATIENT
Start: 2022-08-29 | End: 2022-08-30 | Stop reason: SDUPTHER

## 2022-08-29 RX ORDER — METFORMIN HYDROCHLORIDE 500 MG/1
1000 TABLET, EXTENDED RELEASE ORAL 2 TIMES DAILY WITH MEALS
Qty: 120 TABLET | Refills: 0 | Status: SHIPPED | OUTPATIENT
Start: 2022-08-29 | End: 2022-08-30 | Stop reason: SDUPTHER

## 2022-08-30 DIAGNOSIS — J30.2 SEASONAL ALLERGIES: ICD-10-CM

## 2022-08-30 DIAGNOSIS — E11.9 TYPE 2 DIABETES MELLITUS WITHOUT COMPLICATION, WITHOUT LONG-TERM CURRENT USE OF INSULIN (HCC): ICD-10-CM

## 2022-08-30 RX ORDER — METFORMIN HYDROCHLORIDE 500 MG/1
1000 TABLET, EXTENDED RELEASE ORAL 2 TIMES DAILY WITH MEALS
Qty: 120 TABLET | Refills: 3 | Status: SHIPPED | OUTPATIENT
Start: 2022-08-30 | End: 2022-10-24 | Stop reason: SDUPTHER

## 2022-08-30 RX ORDER — LEVOCETIRIZINE DIHYDROCHLORIDE 5 MG/1
5 TABLET, FILM COATED ORAL EVERY EVENING
Qty: 90 TABLET | Refills: 3 | Status: SHIPPED | OUTPATIENT
Start: 2022-08-30

## 2022-09-14 DIAGNOSIS — I82.4Y9 DEEP VEIN THROMBOSIS (DVT) OF PROXIMAL LOWER EXTREMITY, UNSPECIFIED CHRONICITY, UNSPECIFIED LATERALITY (HCC): ICD-10-CM

## 2022-09-14 DIAGNOSIS — J30.2 SEASONAL ALLERGIES: ICD-10-CM

## 2022-09-14 DIAGNOSIS — E11.9 TYPE 2 DIABETES MELLITUS WITHOUT COMPLICATION, WITHOUT LONG-TERM CURRENT USE OF INSULIN (HCC): ICD-10-CM

## 2022-09-14 DIAGNOSIS — K21.9 GASTROESOPHAGEAL REFLUX DISEASE WITHOUT ESOPHAGITIS: ICD-10-CM

## 2022-09-15 RX ORDER — BLOOD-GLUCOSE SENSOR
EACH MISCELLANEOUS
Qty: 3 EACH | Refills: 0 | Status: SHIPPED | OUTPATIENT
Start: 2022-09-15 | End: 2022-10-04 | Stop reason: SDUPTHER

## 2022-09-15 RX ORDER — WARFARIN SODIUM 10 MG/1
10 TABLET ORAL DAILY
Qty: 30 TABLET | Refills: 0 | Status: SHIPPED | OUTPATIENT
Start: 2022-09-15

## 2022-09-15 RX ORDER — FAMOTIDINE 40 MG/1
40 TABLET, FILM COATED ORAL
Qty: 90 TABLET | Refills: 0 | Status: SHIPPED | OUTPATIENT
Start: 2022-09-15

## 2022-09-15 RX ORDER — MONTELUKAST SODIUM 10 MG/1
10 TABLET ORAL
Qty: 90 TABLET | Refills: 0 | Status: SHIPPED | OUTPATIENT
Start: 2022-09-15

## 2022-09-22 ENCOUNTER — TELEMEDICINE (OUTPATIENT)
Dept: PSYCHIATRY | Facility: CLINIC | Age: 37
End: 2022-09-22
Payer: COMMERCIAL

## 2022-09-22 DIAGNOSIS — F41.1 GENERALIZED ANXIETY DISORDER: Primary | ICD-10-CM

## 2022-09-22 PROCEDURE — 90834 PSYTX W PT 45 MINUTES: CPT | Performed by: SOCIAL WORKER

## 2022-09-22 NOTE — PSYCH
Problem List Items Addressed This Visit    None     Visit Diagnoses     Generalized anxiety disorder    -  Primary          VIRTUAL VISIT DISCLAIMER  Angelica James verbally agrees to participate in [employer]  Pt is aware that [employer] could be limited without vital signs or the ability to perform a full hands-on physical exam   Rajendra Sensor understands she or the provider may request at any time to terminate the video visit and request the patient to seek care or treatment in person  Session start time: 1501  Session end time: 1547    Length of time in session: 46 minutes, follow up in 4   week    Psychotherapy Provided: Individual    Goals addressed in session: Goal 1    Pain: none reported     Current suicide risk: Pool Alfaro: low    Data: Met with Rajendra Sensor for scheduled individual session  In last session Angelica James worked on: identifying her successes in attending Dr appointments and following through  She also processed through changes that she had been making to her eating habits  Angelica had identified in the next session she wanted to address was her relationship with her mother in law and her letting go of things that need to be addressed  This sessions topics discussed included family stressors and anxiety and excessive worry  Angelica James reports that she really has noticed a connection between her anxiety and her menstrual cycle  Clinician and Angelica worked on using the self talk to use during this time to call it for what it is and using Radical acceptance  Angelica reports that her sugars have been in the high 100's and 200's  Angelica identified how happy and proud she felt of herself making these changes in her daily lifestyle  Angelica shared that on several occassions she had people coming up to her telling her how food she looks, commenting that they see her radiating and glowing    Angelica and clinician processed how this is others recognizing her progress and how the changes in mood and lifestyle are all connected  Angelica reports that her  has been very supportive of her and that she likes that this change is not coming from him but rather is coming from within herself  Clinician asked Angelica to further expand on her relationship with her mother in law  She stated that she confronted her mother in law on her mumbling  She stated that she shared with her  that she was having stuggled with his mother and that she needed help  Angelica reports that there was a sit down between the three of them and she reports that she felt that her  was supportive of me and how I felt and stood by me  She reports that she feels better having emptied out all the things that have been bothering her  She reports that there is no foreseeable changes that are occurring in the future  Clinician redirected the conversation to looking at ways to problem solve communication with her mother in law  Angelica shared that she has tried different ways to get a long with her and to communicate with her and at this point no one way has been successful  Angelica identified that she feels that she has been successful jus tin choosing to no longer hold onto her angers and let things go trying to make everyone happy  Felipe Jade shows evidence of utilizing cognitive reframing, communication skills (I messages, assertive), boundaries and self care to manage mental health symptoms  During this session, this clinical used the following therapeutic modalities Supportive psychotherapy, Client-centered therapy, Motivations interviewing and assertiveness skills  Assessment:  Angelica Regalado presents with a within normal limits mood  Angelica Nolen affect is normal range and intensity and appropriate  she exhibits strong therapeutic rapport with this clinician  she presented calm and cooperative , adequate hygiene and grooming and good eye contact   Speech was a normal rate    Thought process was normal thought processes  Thought content was no delusions  Insight was Insight intact  Judgement presents as judgment was intact  Daniele Bhagat presents with a low risk of suicide, low risk of self-harm and minimal of harm to others  she continues to exhibit willingness to work on treatment goals and objectives  Daniele Bhagat is showing progress in treatment by utilizing communication, assertiveness, focus on health and making healthy decisions for both lifestyle and overall health  Daniele Bhagat continues to struggle with maintaining relationship boundaries  Plan:  Daniele Bhagat will return in 4 weeks for the next scheduled session  Between sessions, she will continue with communication and emotion management and will report back during the next session re: success and barriers  At the next session, this clinical will use Supportive psychotherapy, Solution-focused therapy, communication skills and Strength based therapy  in effort to assist Daniele Bhagat with meeting treatment goals  Behavior Health Treatment Plan [facility]: Diagnosis and treatment plan explained to Daniele Bhagat, she relates understanding and is agreeable to Treatment Plan: yes    Virtual Regular Visit    Verification of patient location:    Patient is located in the following state in which I hold an active license [place]      Assessment/Plan:    Problem List Items Addressed This Visit    None     Visit Diagnoses     Generalized anxiety disorder    -  Primary          Goals addressed in session: Goal 1          Reason for visit is No chief complaint on file  Encounter provider Cornelius Ritter LCSW    Provider located at [address] [place] [address] [address] Bats[address] [place] [address] [id number] [phone]      Recent Visits  No visits were found meeting these conditions    Showing recent visits within past 7 days and meeting all other requirements  Future Appointments  No visits were found meeting these conditions  Showing future appointments within next 150 days and meeting all other requirements       The patient was identified by name and date of birth  Nilda Murphy was informed that this is a telemedicine visit and that the visit is being conducted throughEpic Embedded and patient was informed this is a secure, HIPAA-complaint platform  She agrees to proceed     My office door was closed  No one else was in the room  She acknowledged consent and understanding of privacy and security of the video platform  The patient has agreed to participate and understands they can discontinue the visit at any time  Patient is aware this is a billable service  Adithya Pineda is a 40 y o  female    HPI     Past Medical History:   Diagnosis Date    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[facility] )     Epistaxis     Factor V Leiden mutation ([facility] )     Herpes zoster     Hypotension     Menorrhagia     Morbid obesity with BMI of 45 0-49 9, adult ([facility] ) 8/5/2019       Past Surgical History:   Procedure Laterality Date    DILATION AND CURETTAGE OF UTERUS      VENA CAVA FILTER PLACEMENT      INTERRUPTION INFERIOR, GREENFIRELD FILTER PLACEMENT       Current Outpatient Medications   Medication Sig Dispense Refill    Continuous Blood Gluc Sensor (Dexcom G6 Sensor) MISC Test blood sugar 2-3 times a day 3 each 0    Dulaglutide 3 MG/0 5ML SOPN Inject 0 5 mL (3 mg total) under the skin once a week 2 mL 0    famotidine (PEPCID) 40 MG tablet Take 1 tablet (40 mg total) by mouth daily at bedtime 90 tablet 0    montelukast (SINGULAIR) 10 mg tablet Take 1 tablet (10 mg total) by mouth daily at bedtime 90 tablet 0    warfarin (COUMADIN) 10 mg tablet Take 1 tablet (10 mg total) by mouth daily 30 tablet 0    Blood Glucose Monitoring Suppl (OneTouch Verio Reflect) w/Device KIT Check blood sugars twice daily  Please substitute with appropriate alternative as covered by patient's insurance   Dx: E11 65 1 kit 0    Blood Glucose Monitoring Suppl MISC test one time daily      Continuous Blood Gluc  (Dexcom G6 ) GRISEL Test blood sugar 2-3 times a day 1 each 0    Continuous Blood Gluc Transmit (Dexcom G6 Transmitter) MISC Use 1 Units 2 (two) times a day 1 each 0    Continuous Blood Gluc Transmit (Dexcom G6 Transmitter) MISC Test blood sugar 2-3 times a day 1 each 3    Empagliflozin (Jardiance) 10 MG TABS Take 1 tablet (10 mg total) by mouth in the morning 90 tablet 1    FLUoxetine (PROzac) 20 mg capsule Take 1 capsule (20 mg total) by mouth daily 90 capsule 2    glucose blood (OneTouch Verio) test strip Check blood sugars twice daily  Please substitute with appropriate alternative as covered by patient's insurance  Dx: E11 65 200 each 0    glucose blood test strip test one time daily      hydrOXYzine HCL (ATARAX) 25 mg tablet TAKE 1 TABLET (25 MG TOTAL) BY MOUTH EVERY 6 (SIX) HOURS AS NEEDED FOR ANXIETY 20 tablet 0    Lancets MISC test one time daily      levocetirizine (XYZAL) 5 MG tablet Take 1 tablet (5 mg total) by mouth every evening 90 tablet 3    metFORMIN (GLUCOPHAGE-XR) 500 mg 24 hr tablet Take 2 tablets (1,000 mg total) by mouth 2 (two) times a day with meals 120 tablet 3    nystatin (MYCOSTATIN) ointment Apply topically 2 (two) times a day Use as needed for yeast infections (itching) on the vulva (do not use with nystatin powder) 30 g 0    nystatin (MYCOSTATIN) powder Apply topically 3 (three) times a day Use powder externally as needed for yeast infections (itching) on the vulva (do not use with nystatin ointment) 15 g 0    OneTouch Delica Lancets 59N MISC Check blood sugars twice daily  Please substitute with appropriate alternative as covered by patient's insurance  Dx: E11 65 200 each 0     No current facility-administered medications for this visit          Allergies   Allergen Reactions    No Known Allergies     Other      seasonal       Review of Systems    Video Exam    There were no vitals filed for this visit      Physical Exam     I spent 46 minutes directly with the patient during this visit

## 2022-09-27 ENCOUNTER — OFFICE VISIT (OUTPATIENT)
Dept: FAMILY MEDICINE CLINIC | Facility: CLINIC | Age: 37
End: 2022-09-27
Payer: COMMERCIAL

## 2022-09-27 VITALS
RESPIRATION RATE: 18 BRPM | HEART RATE: 87 BPM | OXYGEN SATURATION: 98 % | DIASTOLIC BLOOD PRESSURE: 60 MMHG | SYSTOLIC BLOOD PRESSURE: 100 MMHG | WEIGHT: 269.8 LBS | TEMPERATURE: 97.4 F | BODY MASS INDEX: 44.9 KG/M2

## 2022-09-27 DIAGNOSIS — K11.20 PAROTIDITIS: Primary | ICD-10-CM

## 2022-09-27 PROCEDURE — 99214 OFFICE O/P EST MOD 30 MIN: CPT | Performed by: NURSE PRACTITIONER

## 2022-09-27 NOTE — PATIENT INSTRUCTIONS
Parotid Duct Obstruction   WHAT YOU NEED TO KNOW:   A parotid duct obstruction (PDO) is when your parotid gland is blocked  Your parotid glands are found in your cheeks, over your jaw and in front of your ears  They release saliva into your mouth through the parotid duct  Saliva helps break down food and protect your teeth  DISCHARGE INSTRUCTIONS:   Medicines: You may need any of the following:  Antibiotics  are used to treat an infection caused by bacteria  NSAIDs  help decrease swelling and pain or fever  This medicine is available with or without a doctor's order  NSAIDs can cause stomach bleeding or kidney problems in certain people  If you take blood thinner medicine, always ask your healthcare provider if NSAIDs are safe for you  Always read the medicine label and follow directions  Take your medicine as directed  Contact your healthcare provider if you think your medicine is not helping or if you have side effects  Tell him or her if you are allergic to any medicine  Keep a list of the medicines, vitamins, and herbs you take  Include the amounts, and when and why you take them  Bring the list or the pill bottles to follow-up visits  Carry your medicine list with you in case of an emergency  Manage your symptoms:   Keep your mouth moist   Suck on hard or sour candy to get your saliva to flow  Massage the area of your swollen gland  This may help relieve swelling and pain  Apply heat  on your swollen gland for 20 to 30 minutes every 2 hours for as many days as directed  Heat helps decrease pain and swelling  Prevent another blockage:   Drink liquids as directed  Ask your healthcare provider how much liquid to drink each day and which liquids are best for you  Brush and floss your teeth  Good dental hygiene may prevent obstruction and infection      Follow up with your healthcare provider or ENT specialist as directed:  Write down your questions so you remember to ask them during your visits  Contact your healthcare provider or ENT specialist if:   You have a fever  The skin over your parotid gland is red and warm  Your pain and swelling do not go away, or they get worse  Both sides of your face are swollen  Your mouth and eyes are very dry  You have questions or concerns about your condition or care  Return to the emergency department if:   You have severe pain  You cannot move part of your face  © Copyright [employer] 2022 Information is for End User's use only and may not be sold, redistributed or otherwise used for commercial purposes  All illustrations and images included in CareNotes® are the copyrighted property of A D A M , Inc  or Isiah Trujillo   The above information is an  only  It is not intended as medical advice for individual conditions or treatments  Talk to your doctor, nurse or pharmacist before following any medical regimen to see if it is safe and effective for you

## 2022-09-27 NOTE — PROGRESS NOTES
Subjective:   Chief Complaint   Patient presents with    Earache     Right side pain from ear down to jaw  Patient ID: Rajendra Bryant is a 40 y o  female  Patient presents today with R jaw pain starting Saturday  Pain radiates down into R side of neck and throat  Has taken extra strength tylenol with no relief  Earache   Pertinent negatives include no headaches or sore throat  The following portions of the patient's history were reviewed and updated as appropriate: allergies, current medications, past family history, past medical history, past social history, past surgical history and problem list     Review of Systems   Constitutional: Negative for chills, fatigue and fever  HENT: Positive for ear pain and sinus pressure  Negative for sneezing and sore throat  Respiratory: Negative for choking, chest tightness and shortness of breath  Cardiovascular: Negative for chest pain and palpitations  Neurological: Negative for dizziness, syncope and headaches  Psychiatric/Behavioral: Negative for confusion  The patient is not nervous/anxious  Objective:  Vitals:    09/27/22 1255   BP: 100/60   BP Location: Left arm   Patient Position: Sitting   Cuff Size: Standard   Pulse: 87   Resp: 18   Temp: (!) 97 4 °F (36 3 °C)   TempSrc: Temporal   SpO2: 98%   Weight: 122 kg (269 lb 12 8 oz)      Physical Exam  Constitutional:       Appearance: Normal appearance  HENT:      Head:      Jaw: Tenderness (right cheek) and swelling (right cheek) present  Right Ear: Tympanic membrane, ear canal and external ear normal       Left Ear: Tympanic membrane, ear canal and external ear normal       Mouth/Throat:      Mouth: Mucous membranes are moist      Cardiovascular:      Rate and Rhythm: Normal rate and regular rhythm  Pulses: Normal pulses  Heart sounds: Normal heart sounds  Pulmonary:      Effort: Pulmonary effort is normal       Breath sounds: Normal breath sounds  Musculoskeletal:      Cervical back: Normal range of motion  Tenderness (R side) present  Skin:     Capillary Refill: Capillary refill takes less than 2 seconds  Neurological:      Mental Status: She is alert and oriented to person, place, and time  Psychiatric:         Mood and Affect: Mood normal          Behavior: Behavior normal            Assessment/Plan:    No problem-specific Assessment & Plan notes found for this encounter  Diagnoses and all orders for this visit:    Parotiditis  Comments: To suck as often during the day as possible on sour candy   Tylenol or ibuprofen as needed for pain

## 2022-09-27 NOTE — LETTER
September 28, 2022     Patient: Gisele John  YOB: 1985  Date of Visit: 9/27/2022      To Whom it May Concern:    Chava Daley is under my professional care  Angelica was seen in my office on 9/27/2022  Angelica may return to work on 9/28/2022  If you have any questions or concerns, please don't hesitate to call           Sincerely,          DYLAN Zuniga        CC:   No Recipients

## 2022-10-04 DIAGNOSIS — E11.9 TYPE 2 DIABETES MELLITUS WITHOUT COMPLICATION, WITHOUT LONG-TERM CURRENT USE OF INSULIN (HCC): ICD-10-CM

## 2022-10-04 RX ORDER — BLOOD-GLUCOSE SENSOR
EACH MISCELLANEOUS
Qty: 3 EACH | Refills: 0 | Status: SHIPPED | OUTPATIENT
Start: 2022-10-04

## 2022-10-24 DIAGNOSIS — F41.9 ANXIETY: ICD-10-CM

## 2022-10-24 DIAGNOSIS — Z79.4 TYPE 2 DIABETES MELLITUS TREATED WITH INSULIN (HCC): ICD-10-CM

## 2022-10-24 DIAGNOSIS — E11.9 TYPE 2 DIABETES MELLITUS TREATED WITH INSULIN (HCC): ICD-10-CM

## 2022-10-24 DIAGNOSIS — E11.9 TYPE 2 DIABETES MELLITUS WITHOUT COMPLICATION, WITHOUT LONG-TERM CURRENT USE OF INSULIN (HCC): ICD-10-CM

## 2022-10-24 RX ORDER — METFORMIN HYDROCHLORIDE 500 MG/1
1000 TABLET, EXTENDED RELEASE ORAL 2 TIMES DAILY WITH MEALS
Qty: 120 TABLET | Refills: 3 | Status: SHIPPED | OUTPATIENT
Start: 2022-10-24

## 2022-10-24 RX ORDER — BLOOD-GLUCOSE TRANSMITTER
1 EACH MISCELLANEOUS 2 TIMES DAILY
Qty: 1 EACH | Refills: 0 | Status: SHIPPED | OUTPATIENT
Start: 2022-10-24

## 2022-10-24 RX ORDER — LANCETS 30 GAUGE
EACH MISCELLANEOUS DAILY
Qty: 200 EACH | Refills: 1 | Status: SHIPPED | OUTPATIENT
Start: 2022-10-24

## 2022-10-24 RX ORDER — FLUOXETINE HYDROCHLORIDE 20 MG/1
20 CAPSULE ORAL DAILY
Qty: 90 CAPSULE | Refills: 2 | Status: SHIPPED | OUTPATIENT
Start: 2022-10-24

## 2022-10-28 DIAGNOSIS — J30.2 SEASONAL ALLERGIES: ICD-10-CM

## 2022-10-28 DIAGNOSIS — I82.4Y9 DEEP VEIN THROMBOSIS (DVT) OF PROXIMAL LOWER EXTREMITY, UNSPECIFIED CHRONICITY, UNSPECIFIED LATERALITY (HCC): ICD-10-CM

## 2022-10-28 DIAGNOSIS — E11.9 TYPE 2 DIABETES MELLITUS TREATED WITH INSULIN (HCC): ICD-10-CM

## 2022-10-28 DIAGNOSIS — Z79.4 TYPE 2 DIABETES MELLITUS TREATED WITH INSULIN (HCC): ICD-10-CM

## 2022-10-31 RX ORDER — WARFARIN SODIUM 10 MG/1
10 TABLET ORAL DAILY
Qty: 30 TABLET | Refills: 0 | Status: SHIPPED | OUTPATIENT
Start: 2022-10-31

## 2022-10-31 RX ORDER — MONTELUKAST SODIUM 10 MG/1
10 TABLET ORAL
Qty: 90 TABLET | Refills: 0 | Status: SHIPPED | OUTPATIENT
Start: 2022-10-31

## 2022-11-11 DIAGNOSIS — E11.9 TYPE 2 DIABETES MELLITUS WITHOUT COMPLICATION, WITHOUT LONG-TERM CURRENT USE OF INSULIN (HCC): ICD-10-CM

## 2022-11-23 DIAGNOSIS — E11.9 TYPE 2 DIABETES MELLITUS WITHOUT COMPLICATION, WITHOUT LONG-TERM CURRENT USE OF INSULIN (HCC): ICD-10-CM

## 2022-11-23 RX ORDER — BLOOD-GLUCOSE SENSOR
EACH MISCELLANEOUS
Qty: 3 EACH | Refills: 0 | Status: SHIPPED | OUTPATIENT
Start: 2022-11-23

## 2022-11-29 ENCOUNTER — APPOINTMENT (OUTPATIENT)
Dept: LAB | Age: 37
End: 2022-11-29

## 2022-11-29 ENCOUNTER — OFFICE VISIT (OUTPATIENT)
Dept: FAMILY MEDICINE CLINIC | Facility: CLINIC | Age: 37
End: 2022-11-29

## 2022-11-29 VITALS
SYSTOLIC BLOOD PRESSURE: 108 MMHG | TEMPERATURE: 97.2 F | DIASTOLIC BLOOD PRESSURE: 72 MMHG | OXYGEN SATURATION: 100 % | WEIGHT: 269.3 LBS | HEART RATE: 102 BPM | BODY MASS INDEX: 44.81 KG/M2

## 2022-11-29 DIAGNOSIS — E11.9 TYPE 2 DIABETES MELLITUS WITHOUT COMPLICATION, WITHOUT LONG-TERM CURRENT USE OF INSULIN (HCC): ICD-10-CM

## 2022-11-29 DIAGNOSIS — D68.51 FACTOR V LEIDEN MUTATION (HCC): ICD-10-CM

## 2022-11-29 DIAGNOSIS — E11.9 TYPE 2 DIABETES MELLITUS TREATED WITH INSULIN (HCC): ICD-10-CM

## 2022-11-29 DIAGNOSIS — Z79.4 TYPE 2 DIABETES MELLITUS TREATED WITH INSULIN (HCC): ICD-10-CM

## 2022-11-29 DIAGNOSIS — D68.51 FACTOR V LEIDEN MUTATION (HCC): Primary | ICD-10-CM

## 2022-11-29 DIAGNOSIS — I27.82 CHRONIC PULMONARY EMBOLISM WITHOUT ACUTE COR PULMONALE, UNSPECIFIED PULMONARY EMBOLISM TYPE (HCC): ICD-10-CM

## 2022-11-29 NOTE — PROGRESS NOTES
Depression Screening and Follow-up Plan: Patient was screened for depression during today's encounter  They screened negative with a PHQ-2 score of 0  Assessment/Plan:      1  Factor V Leiden mutation ([facility] )  -     Protime-INR; Future    2  Type 2 diabetes mellitus without complication, without long-term current use of insulin ([facility])  Assessment & Plan:    Lab Results   Component Value Date    HGBA1C 10 3 (A) 08/16/2022     Fasting BG range: 200's  Goal A1C <7  Reconciled home medication regimen Jardiance 10 mg, dulaglutide 3 mg, and metformin 1000 mg daily  Recommend increasing metformin a 1000 mg twice a day  Follow-up repeat A1c:  Discussed with patient if A1c is greater than 10 will start Lantus/Levemir which patient is agreeable to  Discussed importance of strict carb control diet and weight reduction  Up to date with diabetic eye exam and Diabetic foot exam    Orders:  -     HEMOGLOBIN A1C W/ EAG ESTIMATION; Future    3  Type 2 diabetes mellitus treated with insulin ([place] [address] )    4  Chronic pulmonary embolism without acute cor pulmonale, unspecified pulmonary embolism type [facility])  Assessment & Plan:  Due for repeat INR  Currently on Coumadin 10 mg daily    Orders:  -     Protime-INR; Future            Subjective:      Patient ID: Vida Ramirez is a 40 y o  female presents today for diabetes follow up  She has dexcom in place  Denies any episodes of hypo glycemia  Blood sugars range between 160-290  HPI    The following portions of the patient's history were reviewed and updated as appropriate: allergies, current medications, past family history, past medical history, past social history, past surgical history and problem list     Review of Systems   Constitutional: Negative for activity change, chills, diaphoresis and fever  HENT: Negative for ear pain, hearing loss, postnasal drip, rhinorrhea, sinus pressure, sinus pain, sneezing and sore throat      Respiratory: Negative for cough, chest tightness, shortness of breath and wheezing  Cardiovascular: Negative for chest pain, palpitations and leg swelling  Gastrointestinal: Negative for abdominal pain, blood in stool, constipation, diarrhea, nausea and vomiting  Genitourinary: Negative for dysuria, frequency, hematuria and urgency  Musculoskeletal: Negative for arthralgias and myalgias  Neurological: Negative for dizziness, syncope, weakness, light-headedness, numbness and headaches  Objective:      /72 (BP Location: Left arm, Patient Position: Sitting, Cuff Size: Adult)   Pulse 102   Temp (!) 97 2 °F (36 2 °C) (Temporal)   Wt 122 kg (269 lb 4 8 oz)   SpO2 100%   BMI 44 81 kg/m²          Physical Exam  Vitals reviewed  Constitutional:       General: She is not in acute distress  Appearance: She is well-developed  She is obese  She is not diaphoretic  HENT:      Head: Normocephalic and atraumatic  Right Ear: External ear normal       Left Ear: External ear normal       Nose: Nose normal  No congestion  Mouth/Throat:      Mouth: Mucous membranes are moist       Pharynx: Oropharynx is clear  No oropharyngeal exudate  Eyes:      General: No scleral icterus  Right eye: No discharge  Left eye: No discharge  Conjunctiva/sclera: Conjunctivae normal       Pupils: Pupils are equal, round, and reactive to light  Neck:      Thyroid: No thyromegaly  Vascular: No JVD  Trachea: No tracheal deviation  Cardiovascular:      Rate and Rhythm: Normal rate and regular rhythm  Heart sounds: Normal heart sounds  No murmur heard  No friction rub  No gallop  Pulmonary:      Effort: Pulmonary effort is normal  No respiratory distress  Breath sounds: Normal breath sounds  No wheezing or rales  Chest:      Chest wall: No tenderness  Abdominal:      General: Bowel sounds are normal  There is no distension  Palpations: Abdomen is soft  Tenderness:  There is no abdominal tenderness  There is no right CVA tenderness, left CVA tenderness, guarding or rebound  Musculoskeletal:         General: Normal range of motion  Cervical back: Normal range of motion and neck supple  No tenderness  Right lower leg: No edema  Left lower leg: No edema  Lymphadenopathy:      Cervical: No cervical adenopathy  Skin:     General: Skin is warm and dry  Neurological:      Mental Status: She is alert and oriented to person, place, and time  Mental status is at baseline  Psychiatric:         Mood and Affect: Mood normal          Behavior: Behavior normal          Thought Content:  Thought content normal          Judgment: Judgment normal

## 2022-11-29 NOTE — ASSESSMENT & PLAN NOTE
Lab Results   Component Value Date    HGBA1C 10 3 (A) 08/16/2022     • Fasting BG range: 200's  • Goal A1C <7  • Reconciled home medication regimen Jardiance 10 mg, dulaglutide 3 mg, and metformin 1000 mg daily  • Recommend increasing metformin a 1000 mg twice a day  • Follow-up repeat A1c:  Discussed with patient if A1c is greater than 10 will start Lantus/Levemir which patient is agreeable to  • Discussed importance of strict carb control diet and weight reduction  • Up to date with diabetic eye exam and Diabetic foot exam

## 2022-11-30 ENCOUNTER — ANTICOAG VISIT (OUTPATIENT)
Dept: FAMILY MEDICINE CLINIC | Facility: CLINIC | Age: 37
End: 2022-11-30

## 2022-11-30 ENCOUNTER — TREATMENT (OUTPATIENT)
Dept: FAMILY MEDICINE CLINIC | Facility: CLINIC | Age: 37
End: 2022-11-30

## 2022-11-30 DIAGNOSIS — I27.82 CHRONIC PULMONARY EMBOLISM WITHOUT ACUTE COR PULMONALE, UNSPECIFIED PULMONARY EMBOLISM TYPE (HCC): ICD-10-CM

## 2022-11-30 DIAGNOSIS — I82.4Y9 DEEP VEIN THROMBOSIS (DVT) OF PROXIMAL LOWER EXTREMITY, UNSPECIFIED CHRONICITY, UNSPECIFIED LATERALITY (HCC): Primary | ICD-10-CM

## 2022-11-30 LAB
EST. AVERAGE GLUCOSE BLD GHB EST-MCNC: 192 MG/DL
HBA1C MFR BLD: 8.3 %

## 2022-12-04 DIAGNOSIS — I82.4Y9 DEEP VEIN THROMBOSIS (DVT) OF PROXIMAL LOWER EXTREMITY, UNSPECIFIED CHRONICITY, UNSPECIFIED LATERALITY (HCC): ICD-10-CM

## 2022-12-05 RX ORDER — WARFARIN SODIUM 10 MG/1
10 TABLET ORAL DAILY
Qty: 30 TABLET | Refills: 0 | Status: SHIPPED | OUTPATIENT
Start: 2022-12-05

## 2022-12-12 DIAGNOSIS — E11.9 TYPE 2 DIABETES MELLITUS WITHOUT COMPLICATION, WITHOUT LONG-TERM CURRENT USE OF INSULIN (HCC): ICD-10-CM

## 2022-12-13 RX ORDER — BLOOD-GLUCOSE TRANSMITTER
1 EACH MISCELLANEOUS 2 TIMES DAILY
Qty: 1 EACH | Refills: 0 | Status: SHIPPED | OUTPATIENT
Start: 2022-12-13

## 2022-12-13 RX ORDER — BLOOD-GLUCOSE SENSOR
EACH MISCELLANEOUS
Qty: 3 EACH | Refills: 0 | Status: SHIPPED | OUTPATIENT
Start: 2022-12-13

## 2023-01-02 ENCOUNTER — OFFICE VISIT (OUTPATIENT)
Dept: URGENT CARE | Age: 38
End: 2023-01-02

## 2023-01-02 VITALS
BODY MASS INDEX: 44.76 KG/M2 | DIASTOLIC BLOOD PRESSURE: 76 MMHG | SYSTOLIC BLOOD PRESSURE: 124 MMHG | TEMPERATURE: 97.2 F | OXYGEN SATURATION: 98 % | HEART RATE: 95 BPM | WEIGHT: 269 LBS | RESPIRATION RATE: 18 BRPM

## 2023-01-02 DIAGNOSIS — B86 SCABIES: ICD-10-CM

## 2023-01-02 DIAGNOSIS — L28.2 PRURITIC RASH: Primary | ICD-10-CM

## 2023-01-02 RX ORDER — METHYLPREDNISOLONE 4 MG/1
TABLET ORAL
Qty: 1 EACH | Refills: 0 | Status: SHIPPED | OUTPATIENT
Start: 2023-01-02

## 2023-01-02 RX ORDER — PERMETHRIN 50 MG/G
CREAM TOPICAL ONCE
Qty: 60 G | Refills: 1 | Status: SHIPPED | OUTPATIENT
Start: 2023-01-02 | End: 2023-01-02

## 2023-01-02 NOTE — LETTER
January 2, 2023     Patient: Haja Houston   YOB: 1985   Date of Visit: 1/2/2023       To Whom it May Concern:    Yohannes Rubi was seen in my clinic on 1/2/2023  She may return to work on 1/4/2023  If you have any questions or concerns, please don't hesitate to call           Sincerely,          DYLAN Teresa        CC: No Recipients

## 2023-01-02 NOTE — PATIENT INSTRUCTIONS
Please apply permethrin cream as directed tonight and rinse off in morning  Strongly urged appropriate cleaning and washing of bed sheets, clothing, and other fabrics in hot water to reduce reinfection  Do not share hand towels during treatment  May re-treat in one week for persistent symptoms  May take oral steroid as directed to reduce itching  Follow up as needed for any persistent or worsening symptoms

## 2023-01-02 NOTE — PROGRESS NOTES
[facility]        NAME: Izzy Sevilla is a 40 y o  female  : 1985    MRN: [id number]  DATE: 2023  TIME: 6:30 PM    Assessment and Plan   Pruritic rash [L28 2]  1  Pruritic rash  permethrin (ELIMITE) 5 % cream    methylPREDNISolone 4 MG tablet therapy pack      2  Scabies  permethrin (ELIMITE) 5 % cream            Patient Instructions       Follow up with PCP in 3-5 days  Proceed to  ER if symptoms worsen  Patient Instructions   Please apply permethrin cream as directed tonight and rinse off in morning  Strongly urged appropriate cleaning and washing of bed sheets, clothing, and other fabrics in hot water to reduce reinfection  Do not share hand towels during treatment  May re-treat in one week for persistent symptoms  May take oral steroid as directed to reduce itching  Follow up as needed for any persistent or worsening symptoms  Chief Complaint     Chief Complaint   Patient presents with   • Allergic Reaction     Patient stated she had coffee, cream and caramel   and started to have allergic reaction on her back , chest,  abdomen and neck it happen Saturday  History of Present Illness       Here today with red pinpoint diffuse rash on upper torso and upper extremities that began on upper back and spread to other areas since onset 3 days ago  Pruritic  No new detergents, lotions, soaps  Did wear new pajamas without washing them on [place]  Afebrile  No recent uri symptoms  Allergic Reaction  Associated symptoms include a rash  Pertinent negatives include no abdominal pain, chest pain, coughing, diarrhea, eye redness or vomiting  Rash  This is a new problem  The current episode started in the past 7 days  The problem has been waxing and waning since onset  The affected locations include the chest and back  The rash is characterized by itchiness   Pertinent negatives include no anorexia, congestion, cough, diarrhea, facial edema, fatigue, fever, joint pain, rhinorrhea, shortness of breath, sore throat or vomiting  Review of Systems   Review of Systems   Constitutional: Negative for fatigue and fever  HENT: Negative for congestion, ear pain, rhinorrhea, sneezing and sore throat  Eyes: Negative for pain, discharge and redness  Respiratory: Negative for cough, chest tightness and shortness of breath  Cardiovascular: Negative for chest pain  Gastrointestinal: Negative for abdominal pain, anorexia, diarrhea, nausea and vomiting  Genitourinary: Negative for decreased urine volume  Musculoskeletal: Negative for joint pain and myalgias  Skin: Positive for rash  Allergic/Immunologic: Negative for environmental allergies  Neurological: Negative for dizziness, syncope and headaches  Hematological: Negative for adenopathy  Psychiatric/Behavioral: Negative for sleep disturbance  Current Medications       Current Outpatient Medications:   •  Continuous Blood Gluc Sensor (Dexcom G6 Sensor) MISC, Test blood sugar 2-3 times a day, Disp: 3 each, Rfl: 0  •  Continuous Blood Gluc Transmit (Dexcom G6 Transmitter) MISC, Use 1 Units 2 (two) times a day, Disp: 1 each, Rfl: 0  •  Dulaglutide 3 MG/0 5ML SOPN, Inject 0 5 mL (3 mg total) under the skin once a week, Disp: 2 mL, Rfl: 0  •  methylPREDNISolone 4 MG tablet therapy pack, Use as directed, Disp: 1 each, Rfl: 0  •  permethrin (ELIMITE) 5 % cream, Apply topically once for 1 dose As directed  May retreat as needed in 7 days, Disp: 60 g, Rfl: 1  •  warfarin (COUMADIN) 10 mg tablet, Take 1 tablet (10 mg total) by mouth daily, Disp: 30 tablet, Rfl: 0  •  Blood Glucose Monitoring Suppl (OneTouch Verio Reflect) w/Device KIT, Check blood sugars twice daily  Please substitute with appropriate alternative as covered by patient's insurance   Dx: E11 65, Disp: 1 kit, Rfl: 0  •  Blood Glucose Monitoring Suppl MISC, test one time daily, Disp: , Rfl:   •  Continuous Blood Gluc  (Dexcom G6 ) GRISEL, Test blood sugar 2-3 times a day, Disp: 1 each, Rfl: 0  •  Continuous Blood Gluc Transmit (Dexcom G6 Transmitter) MISC, Test blood sugar 2-3 times a day, Disp: 1 each, Rfl: 3  •  Empagliflozin (Jardiance) 10 MG TABS tablet, Take 1 tablet (10 mg total) by mouth in the morning, Disp: 90 tablet, Rfl: 1  •  famotidine (PEPCID) 40 MG tablet, Take 1 tablet (40 mg total) by mouth daily at bedtime, Disp: 90 tablet, Rfl: 0  •  FLUoxetine (PROzac) 20 mg capsule, Take 1 capsule (20 mg total) by mouth daily, Disp: 90 capsule, Rfl: 2  •  glucose blood (OneTouch Verio) test strip, Check blood sugars twice daily  Please substitute with appropriate alternative as covered by patient's insurance  Dx: E11 65, Disp: 200 each, Rfl: 0  •  glucose blood test strip, Use 1 each daily Use as instructed, Disp: 200 strip, Rfl: 1  •  hydrOXYzine HCL (ATARAX) 25 mg tablet, TAKE 1 TABLET (25 MG TOTAL) BY MOUTH EVERY 6 (SIX) HOURS AS NEEDED FOR ANXIETY, Disp: 20 tablet, Rfl: 0  •  Lancets MISC, Inject under the skin daily Check sugar using side of finger against lancet, Disp: 200 each, Rfl: 1  •  levocetirizine (XYZAL) 5 MG tablet, Take 1 tablet (5 mg total) by mouth every evening, Disp: 90 tablet, Rfl: 3  •  metFORMIN (GLUCOPHAGE-XR) 500 mg 24 hr tablet, Take 2 tablets (1,000 mg total) by mouth 2 (two) times a day with meals, Disp: 120 tablet, Rfl: 3  •  montelukast (SINGULAIR) 10 mg tablet, Take 1 tablet (10 mg total) by mouth daily at bedtime, Disp: 90 tablet, Rfl: 0  •  nystatin (MYCOSTATIN) ointment, Apply topically 2 (two) times a day Use as needed for yeast infections (itching) on the vulva (do not use with nystatin powder), Disp: 30 g, Rfl: 0  •  nystatin (MYCOSTATIN) powder, Apply topically 3 (three) times a day Use powder externally as needed for yeast infections (itching) on the vulva (do not use with nystatin ointment), Disp: 15 g, Rfl: 0  •  OneTouch Delica Lancets 80R MISC, Check blood sugars twice daily   Please substitute with appropriate alternative as covered by patient's insurance  Dx: E11 65, Disp: 200 each, Rfl: 0    Current Allergies     Allergies as of 01/02/2023 - Reviewed 01/02/2023   Allergen Reaction Noted   • No known allergies  02/20/2018   • Other  08/01/2019            The following portions of the patient's history were reviewed and updated as appropriate: allergies, current medications, past family history, past medical history, past social history, past surgical history and problem list      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[place] [address] )    • Epistaxis    • Factor V Leiden mutation ([place] [address] )    • Herpes zoster    • Hypotension    • Menorrhagia    • Morbid obesity with BMI of 45 0-49 9, adult ([place] [address] ) 8/5/2019       Past Surgical History:   Procedure Laterality Date   • DILATION AND CURETTAGE OF UTERUS     • VENA CAVA FILTER PLACEMENT      INTERRUPTION INFERIOR, GREENFIRELD FILTER PLACEMENT       Family History   Problem Relation Age of Onset   • Diabetes Father    • Diabetes type II Father    • Cancer Family    • Stroke Family    • Heart disease Family    • Hypercoagulant Ability Family    • Factor V Leiden deficiency Family         MUTATION         Medications have been verified  Objective   /76   Pulse 95   Temp (!) 97 2 °F (36 2 °C) (Temporal)   Resp 18   Wt 122 kg (269 lb)   SpO2 98%   BMI 44 76 kg/m²   No LMP recorded  Physical Exam     Physical Exam  Vitals reviewed  Constitutional:       General: She is not in acute distress  Appearance: She is well-developed and well-groomed  She is not ill-appearing  HENT:      Head: Normocephalic  Mouth/Throat:      Lips: Pink  Eyes:      General: Lids are normal          Right eye: No discharge  Left eye: No discharge  Cardiovascular:      Rate and Rhythm: Regular rhythm        Heart sounds: Normal heart sounds, S1 normal and S2 normal    Pulmonary:      Effort: Pulmonary effort is normal  No respiratory distress  Breath sounds: Normal breath sounds and air entry  No decreased breath sounds, wheezing or rhonchi  Musculoskeletal:      Cervical back: Normal range of motion  Lymphadenopathy:      Cervical: No cervical adenopathy  Skin:     General: Skin is warm and dry  Findings: Rash (single pinpoint erythematous papules scattered diffusely across anterior and posterior torso and bilateral upper extremities  some in linear arrangement) present  Neurological:      Mental Status: She is alert  Psychiatric:         Behavior: Behavior normal  Behavior is cooperative

## 2023-01-06 DIAGNOSIS — F41.9 ANXIETY: ICD-10-CM

## 2023-01-06 DIAGNOSIS — E11.9 TYPE 2 DIABETES MELLITUS WITHOUT COMPLICATION, WITHOUT LONG-TERM CURRENT USE OF INSULIN (HCC): ICD-10-CM

## 2023-01-06 RX ORDER — FLUOXETINE HYDROCHLORIDE 20 MG/1
20 CAPSULE ORAL DAILY
Qty: 90 CAPSULE | Refills: 0 | Status: SHIPPED | OUTPATIENT
Start: 2023-01-06

## 2023-01-18 DIAGNOSIS — E11.9 TYPE 2 DIABETES MELLITUS WITHOUT COMPLICATION, WITHOUT LONG-TERM CURRENT USE OF INSULIN (HCC): ICD-10-CM

## 2023-01-18 DIAGNOSIS — Z79.4 TYPE 2 DIABETES MELLITUS TREATED WITH INSULIN (HCC): ICD-10-CM

## 2023-01-18 DIAGNOSIS — E11.9 TYPE 2 DIABETES MELLITUS TREATED WITH INSULIN (HCC): ICD-10-CM

## 2023-01-18 DIAGNOSIS — I82.4Y9 DEEP VEIN THROMBOSIS (DVT) OF PROXIMAL LOWER EXTREMITY, UNSPECIFIED CHRONICITY, UNSPECIFIED LATERALITY (HCC): ICD-10-CM

## 2023-01-18 RX ORDER — WARFARIN SODIUM 10 MG/1
10 TABLET ORAL DAILY
Qty: 30 TABLET | Refills: 0 | Status: SHIPPED | OUTPATIENT
Start: 2023-01-18 | End: 2023-01-19 | Stop reason: SDUPTHER

## 2023-01-18 RX ORDER — BLOOD-GLUCOSE SENSOR
EACH MISCELLANEOUS
Qty: 3 EACH | Refills: 0 | Status: SHIPPED | OUTPATIENT
Start: 2023-01-18

## 2023-01-19 ENCOUNTER — APPOINTMENT (OUTPATIENT)
Dept: LAB | Age: 38
End: 2023-01-19

## 2023-01-19 DIAGNOSIS — J30.2 SEASONAL ALLERGIES: ICD-10-CM

## 2023-01-19 DIAGNOSIS — Z79.4 TYPE 2 DIABETES MELLITUS TREATED WITH INSULIN (HCC): ICD-10-CM

## 2023-01-19 DIAGNOSIS — E11.9 TYPE 2 DIABETES MELLITUS TREATED WITH INSULIN (HCC): ICD-10-CM

## 2023-01-19 DIAGNOSIS — I82.4Y9 DEEP VEIN THROMBOSIS (DVT) OF PROXIMAL LOWER EXTREMITY, UNSPECIFIED CHRONICITY, UNSPECIFIED LATERALITY (HCC): ICD-10-CM

## 2023-01-19 RX ORDER — MONTELUKAST SODIUM 10 MG/1
10 TABLET ORAL
Qty: 90 TABLET | Refills: 0 | Status: SHIPPED | OUTPATIENT
Start: 2023-01-19

## 2023-01-19 RX ORDER — WARFARIN SODIUM 10 MG/1
10 TABLET ORAL DAILY
Qty: 90 TABLET | Refills: 1 | Status: SHIPPED | OUTPATIENT
Start: 2023-01-19

## 2023-01-19 NOTE — TELEPHONE ENCOUNTER
Let patient know that she should recheck PT/INR in 1 month after taking her 10mg of coumadin daily consistently

## 2023-01-19 NOTE — TELEPHONE ENCOUNTER
Patient called to let us know she missed her warfarin for few weeks  Patient is asymptomatic, given advised to get INR done asap so that adjustment can be done asap

## 2023-02-01 ENCOUNTER — DOCUMENTATION (OUTPATIENT)
Dept: PSYCHIATRY | Facility: CLINIC | Age: 38
End: 2023-02-01

## 2023-02-01 NOTE — PROGRESS NOTES
Psychotherapy Discharge Summary    Preferred Name: Duane Crank  YOB: 1985    Admission date to psychotherapy: 6/21/22    Referred by: self    Presenting Problem: Duane Crank reports that she is seeking out services as a result of recognition that her thinking patterns are causing her distress  Angelica stated "I have a thing for a long time now where I have felt that instead of taking a situation and making it a positive one, I instantly begin thinking the worst"  "I care too much about people and I know I shouldn't"  Angelica reports that she feels as if she has always struggled with her thinking patterns but it was not until 2013 that she recognized that it was causing issues for her  Angelica did note that during her menstrual cycle she was able to notice that there was an increase in the spikes in her anxiety  Angelica reports that as of recent she has noticed that in her job position she was increasing in her anxiety  Angelica was at work experiencing difficulty with focusing, racing thoughts, fears of what others were thinking about her, inability to control worry, taking her work home, unable to disconnect, reports more forgetfulness that is typical, disruption in her sleep and increases in her reactiveness and and irritability  Angelica reports that from March to June of this year she was having thoughts that she was "going crazy and that I needed to be checked into the hospital"  Course of treatment included : psychoeducation and individual therapy     Progress/Outcome of Treatment Goals (brief summary of course of treatment) Positive  Angelica engaged in individual therapy and met for a total of 5 sessions  While in individual therapy Angelica was provided with Psychoeducation on anxiety and panic attacks  While in session CBT was used to increase awareness to the thought chain analysis and empowering Angelica to manage her thoughts as well as increasing control over her emotions    Angelica reported improvements in her emotional management by evidence of being more vocal of her needs and thoughts, changed in her relationship with food, reported reduction in her anxiety attacks and improvements in health overall  Treatment Complications (if any): none    Treatment Progress: good    Current [facility] Psychiatric Provider: none    Discharge Medications include: none known    Discharge Date: 11/28/2022    Discharge Diagnosis: No diagnosis found  Criteria for Discharge: completed treatment goals and objectives and is no longer in need of services    Aftercare recommendations include (include specific referral names and phone numbers, if appropriate): Angelica is recommended to contact therapy if symptoms reemerge      Prognosis: good

## 2023-02-06 ENCOUNTER — TELEPHONE (OUTPATIENT)
Dept: PSYCHIATRY | Facility: CLINIC | Age: 38
End: 2023-02-06

## 2023-02-06 NOTE — TELEPHONE ENCOUNTER
DISCHARGE LETTER for Raul Macias LCSW (certified and regular) placed in outgoing mail on 02/06/23      Article #:  [id number]     Address:  [address] [place] [address]

## 2023-02-12 DIAGNOSIS — I82.4Y9 DEEP VEIN THROMBOSIS (DVT) OF PROXIMAL LOWER EXTREMITY, UNSPECIFIED CHRONICITY, UNSPECIFIED LATERALITY (HCC): ICD-10-CM

## 2023-02-12 DIAGNOSIS — E11.9 TYPE 2 DIABETES MELLITUS WITHOUT COMPLICATION, WITHOUT LONG-TERM CURRENT USE OF INSULIN (HCC): ICD-10-CM

## 2023-02-13 DIAGNOSIS — K21.9 GASTROESOPHAGEAL REFLUX DISEASE WITHOUT ESOPHAGITIS: ICD-10-CM

## 2023-02-13 RX ORDER — FAMOTIDINE 40 MG/1
40 TABLET, FILM COATED ORAL
Qty: 90 TABLET | Refills: 0 | Status: SHIPPED | OUTPATIENT
Start: 2023-02-13 | End: 2023-02-15 | Stop reason: SDUPTHER

## 2023-02-13 RX ORDER — METFORMIN HYDROCHLORIDE 500 MG/1
1000 TABLET, EXTENDED RELEASE ORAL 2 TIMES DAILY WITH MEALS
Qty: 120 TABLET | Refills: 0 | Status: SHIPPED | OUTPATIENT
Start: 2023-02-13

## 2023-02-14 DIAGNOSIS — I82.4Y9 DEEP VEIN THROMBOSIS (DVT) OF PROXIMAL LOWER EXTREMITY, UNSPECIFIED CHRONICITY, UNSPECIFIED LATERALITY (HCC): Primary | ICD-10-CM

## 2023-02-14 RX ORDER — WARFARIN SODIUM 10 MG/1
10 TABLET ORAL DAILY
Qty: 90 TABLET | Refills: 0 | Status: SHIPPED | OUTPATIENT
Start: 2023-02-14

## 2023-02-14 NOTE — TELEPHONE ENCOUNTER
Certificate for the Discharge Letter was signed/received on 2/14/2023  A copy has been scanned into Media

## 2023-02-15 DIAGNOSIS — K21.9 GASTROESOPHAGEAL REFLUX DISEASE WITHOUT ESOPHAGITIS: ICD-10-CM

## 2023-02-15 RX ORDER — FAMOTIDINE 40 MG/1
40 TABLET, FILM COATED ORAL
Qty: 90 TABLET | Refills: 0 | Status: SHIPPED | OUTPATIENT
Start: 2023-02-15

## 2023-02-27 ENCOUNTER — OFFICE VISIT (OUTPATIENT)
Dept: FAMILY MEDICINE CLINIC | Facility: CLINIC | Age: 38
End: 2023-02-27

## 2023-02-27 VITALS
WEIGHT: 261.4 LBS | BODY MASS INDEX: 43.5 KG/M2 | SYSTOLIC BLOOD PRESSURE: 116 MMHG | DIASTOLIC BLOOD PRESSURE: 78 MMHG | OXYGEN SATURATION: 94 % | HEART RATE: 92 BPM | TEMPERATURE: 97.3 F

## 2023-02-27 DIAGNOSIS — I27.82 CHRONIC PULMONARY EMBOLISM WITHOUT ACUTE COR PULMONALE, UNSPECIFIED PULMONARY EMBOLISM TYPE (HCC): ICD-10-CM

## 2023-02-27 DIAGNOSIS — D68.51 FACTOR V LEIDEN MUTATION (HCC): ICD-10-CM

## 2023-02-27 DIAGNOSIS — E66.01 MORBID OBESITY WITH BMI OF 45.0-49.9, ADULT (HCC): ICD-10-CM

## 2023-02-27 DIAGNOSIS — E55.9 VITAMIN D INSUFFICIENCY: ICD-10-CM

## 2023-02-27 DIAGNOSIS — E11.9 TYPE 2 DIABETES MELLITUS WITHOUT COMPLICATION, WITHOUT LONG-TERM CURRENT USE OF INSULIN (HCC): Primary | ICD-10-CM

## 2023-02-27 DIAGNOSIS — I82.4Y9 DEEP VEIN THROMBOSIS (DVT) OF PROXIMAL LOWER EXTREMITY, UNSPECIFIED CHRONICITY, UNSPECIFIED LATERALITY (HCC): ICD-10-CM

## 2023-02-27 DIAGNOSIS — E78.5 HYPERLIPIDEMIA, UNSPECIFIED HYPERLIPIDEMIA TYPE: ICD-10-CM

## 2023-02-27 LAB — SL AMB POCT HEMOGLOBIN AIC: 7.6 (ref ?–6.5)

## 2023-02-27 NOTE — PROGRESS NOTES
Assessment/Plan:      1  Type 2 diabetes mellitus without complication, without long-term current use of insulin (HCC)  Assessment & Plan:    Lab Results   Component Value Date    HGBA1C 7 6 (A) 02/27/2023     Much improved  Tolerating current regimen  Encouraged diet and exercise  DM eye exam form provided to have completed by ophthalmologist  Follow up in 3 months    Orders:  -     POCT hemoglobin A1c  -     Comprehensive metabolic panel; Future  -     Microalbumin / creatinine urine ratio; Future    2  Factor V Leiden mutation ([facility] [address] )  -     Type and screen; Future    3  Chronic pulmonary embolism without acute cor pulmonale, unspecified pulmonary embolism type [facility])  Assessment & Plan:  remais on life long anticoagulation  Has restarted coumading for the past month and due for repeat PT/INR    Orders:  -     Type and screen; Future    4  Deep vein thrombosis (DVT) of proximal lower extremity, unspecified chronicity, unspecified laterality (HCC)  -     Type and screen; Future    5  Hyperlipidemia, unspecified hyperlipidemia type  -     Lipid panel; Future    6  Morbid obesity with BMI of 45 0-49 9, adult [facility])  Assessment & Plan:  Wt Readings from Last 3 Encounters:   02/27/23 119 kg (261 lb 6 4 oz)   01/02/23 122 kg (269 lb)   11/29/22 122 kg (269 lb 4 8 oz)     Improving  Patient I s educated on the importance of portion control dieting  discussed the importance of exercise and recommend at least 30 minutes, 5 times weekly  discussed benefits of weight loss  patient acknowledges that they understood what is discussed          7  Vitamin D insufficiency  -     Vitamin D 25 hydroxy; Future            Subjective:      Patient ID: Jose Miguel Morin is a 40 y o  female presents today for routine follow up  She has lost 8 lbs  Her blood sugar is improving  She had a mistake with coumadin and stopped for 3 months  She is tolerating trulicity 3mg, jardiance 10mg, and metformin      She is due for repeat PT/INR  Denies any abnormal bleeding  She does report let aching but it has since resolved  HPI    The following portions of the patient's history were reviewed and updated as appropriate: allergies, current medications, past family history, past medical history, past social history, past surgical history and problem list     Review of Systems   Constitutional: Negative for activity change, chills, diaphoresis and fever  HENT: Negative for ear pain, hearing loss, postnasal drip, rhinorrhea, sinus pressure, sinus pain, sneezing and sore throat  Respiratory: Negative for cough, chest tightness, shortness of breath and wheezing  Cardiovascular: Negative for chest pain, palpitations and leg swelling  Gastrointestinal: Negative for abdominal pain, blood in stool, constipation, diarrhea, nausea and vomiting  Genitourinary: Negative for dysuria, frequency, hematuria and urgency  Musculoskeletal: Negative for arthralgias and myalgias  Neurological: Negative for dizziness, syncope, weakness, light-headedness, numbness and headaches  Objective:      /78 (BP Location: Left arm, Patient Position: Sitting, Cuff Size: Large)   Pulse 92   Temp (!) 97 3 °F (36 3 °C)   Wt 119 kg (261 lb 6 4 oz)   SpO2 94%   BMI 43 50 kg/m²          Physical Exam  Vitals reviewed  Constitutional:       General: She is not in acute distress  Appearance: She is well-developed  She is not diaphoretic  HENT:      Head: Normocephalic and atraumatic  Right Ear: External ear normal       Left Ear: External ear normal       Nose: Nose normal  No congestion  Mouth/Throat:      Mouth: Mucous membranes are moist       Pharynx: Oropharynx is clear  No oropharyngeal exudate  Eyes:      General: No scleral icterus  Right eye: No discharge  Left eye: No discharge  Conjunctiva/sclera: Conjunctivae normal       Pupils: Pupils are equal, round, and reactive to light     Neck:      Thyroid: No thyromegaly  Vascular: No JVD  Trachea: No tracheal deviation  Cardiovascular:      Rate and Rhythm: Normal rate and regular rhythm  Heart sounds: Normal heart sounds  No murmur heard  No friction rub  No gallop  Pulmonary:      Effort: Pulmonary effort is normal  No respiratory distress  Breath sounds: Normal breath sounds  No wheezing or rales  Chest:      Chest wall: No tenderness  Abdominal:      General: Bowel sounds are normal  There is no distension  Palpations: Abdomen is soft  Tenderness: There is no abdominal tenderness  There is no right CVA tenderness, left CVA tenderness, guarding or rebound  Musculoskeletal:         General: Normal range of motion  Cervical back: Normal range of motion and neck supple  No tenderness  Right lower leg: No edema  Left lower leg: No edema  Lymphadenopathy:      Cervical: No cervical adenopathy  Skin:     General: Skin is warm and dry  Neurological:      Mental Status: She is alert and oriented to person, place, and time  Mental status is at baseline  Psychiatric:         Mood and Affect: Mood normal          Behavior: Behavior normal          Thought Content:  Thought content normal          Judgment: Judgment normal

## 2023-02-27 NOTE — ASSESSMENT & PLAN NOTE
Lab Results   Component Value Date    HGBA1C 7 6 (A) 02/27/2023     Much improved  · Tolerating current regimen  · Encouraged diet and exercise  · DM eye exam form provided to have completed by ophthalmologist  · Follow up in 3 months

## 2023-02-28 NOTE — ASSESSMENT & PLAN NOTE
Wt Readings from Last 3 Encounters:   02/27/23 119 kg (261 lb 6 4 oz)   01/02/23 122 kg (269 lb)   11/29/22 122 kg (269 lb 4 8 oz)     Improving  · Patient I s educated on the importance of portion control dieting  · discussed the importance of exercise and recommend at least 30 minutes, 5 times weekly  · discussed benefits of weight loss  · patient acknowledges that they understood what is discussed

## 2023-02-28 NOTE — ASSESSMENT & PLAN NOTE
· remais on life long anticoagulation  · Has restarted coumading for the past month and due for repeat PT/INR

## 2023-03-01 ENCOUNTER — APPOINTMENT (OUTPATIENT)
Dept: LAB | Age: 38
End: 2023-03-01

## 2023-03-01 DIAGNOSIS — D68.51 FACTOR V LEIDEN MUTATION (HCC): ICD-10-CM

## 2023-03-01 DIAGNOSIS — I27.82 CHRONIC PULMONARY EMBOLISM WITHOUT ACUTE COR PULMONALE, UNSPECIFIED PULMONARY EMBOLISM TYPE (HCC): ICD-10-CM

## 2023-03-01 DIAGNOSIS — E55.9 VITAMIN D INSUFFICIENCY: ICD-10-CM

## 2023-03-01 DIAGNOSIS — I82.4Y9 DEEP VEIN THROMBOSIS (DVT) OF PROXIMAL LOWER EXTREMITY, UNSPECIFIED CHRONICITY, UNSPECIFIED LATERALITY (HCC): ICD-10-CM

## 2023-03-01 LAB — 25(OH)D3 SERPL-MCNC: 17.3 NG/ML (ref 30–100)

## 2023-03-03 DIAGNOSIS — I82.4Y9 DEEP VEIN THROMBOSIS (DVT) OF PROXIMAL LOWER EXTREMITY, UNSPECIFIED CHRONICITY, UNSPECIFIED LATERALITY (HCC): ICD-10-CM

## 2023-03-03 DIAGNOSIS — D68.51 FACTOR V LEIDEN MUTATION (HCC): Primary | ICD-10-CM

## 2023-03-03 DIAGNOSIS — I27.82 CHRONIC PULMONARY EMBOLISM WITHOUT ACUTE COR PULMONALE, UNSPECIFIED PULMONARY EMBOLISM TYPE (HCC): ICD-10-CM

## 2023-03-03 RX ORDER — WARFARIN SODIUM 2.5 MG/1
TABLET ORAL
Qty: 30 TABLET | Refills: 0 | Status: SHIPPED | OUTPATIENT
Start: 2023-03-03

## 2023-03-03 RX ORDER — WARFARIN SODIUM 2.5 MG/1
TABLET ORAL
Qty: 90 TABLET | Refills: 0 | Status: SHIPPED | OUTPATIENT
Start: 2023-03-03 | End: 2023-03-03

## 2023-03-08 DIAGNOSIS — E11.9 TYPE 2 DIABETES MELLITUS WITHOUT COMPLICATION, WITHOUT LONG-TERM CURRENT USE OF INSULIN (HCC): ICD-10-CM

## 2023-03-09 RX ORDER — BLOOD-GLUCOSE SENSOR
EACH MISCELLANEOUS
Qty: 3 EACH | Refills: 0 | Status: SHIPPED | OUTPATIENT
Start: 2023-03-09

## 2023-03-21 ENCOUNTER — APPOINTMENT (OUTPATIENT)
Dept: LAB | Age: 38
End: 2023-03-21

## 2023-03-21 DIAGNOSIS — E78.5 HYPERLIPIDEMIA, UNSPECIFIED HYPERLIPIDEMIA TYPE: ICD-10-CM

## 2023-03-21 DIAGNOSIS — E11.9 TYPE 2 DIABETES MELLITUS WITHOUT COMPLICATION, WITHOUT LONG-TERM CURRENT USE OF INSULIN (HCC): ICD-10-CM

## 2023-03-21 DIAGNOSIS — E55.9 AVITAMINOSIS D: ICD-10-CM

## 2023-03-21 LAB
25(OH)D3 SERPL-MCNC: 16.5 NG/ML (ref 30–100)
ALBUMIN SERPL BCP-MCNC: 3.6 G/DL (ref 3.5–5)
ALP SERPL-CCNC: 66 U/L (ref 46–116)
ALT SERPL W P-5'-P-CCNC: 23 U/L (ref 12–78)
ANION GAP SERPL CALCULATED.3IONS-SCNC: 8 MMOL/L (ref 4–13)
AST SERPL W P-5'-P-CCNC: 13 U/L (ref 5–45)
BILIRUB SERPL-MCNC: 0.36 MG/DL (ref 0.2–1)
BUN SERPL-MCNC: 10 MG/DL (ref 5–25)
CALCIUM SERPL-MCNC: 9.3 MG/DL (ref 8.3–10.1)
CHLORIDE SERPL-SCNC: 104 MMOL/L (ref 96–108)
CHOLEST SERPL-MCNC: 243 MG/DL
CO2 SERPL-SCNC: 23 MMOL/L (ref 21–32)
CREAT SERPL-MCNC: 0.57 MG/DL (ref 0.6–1.3)
CREAT UR-MCNC: 104 MG/DL
GFR SERPL CREATININE-BSD FRML MDRD: 118 ML/MIN/1.73SQ M
GLUCOSE P FAST SERPL-MCNC: 172 MG/DL (ref 65–99)
HDLC SERPL-MCNC: 49 MG/DL
LDLC SERPL CALC-MCNC: 152 MG/DL (ref 0–100)
MICROALBUMIN UR-MCNC: 50.9 MG/L (ref 0–20)
MICROALBUMIN/CREAT 24H UR: 49 MG/G CREATININE (ref 0–30)
NONHDLC SERPL-MCNC: 194 MG/DL
POTASSIUM SERPL-SCNC: 3.8 MMOL/L (ref 3.5–5.3)
PROT SERPL-MCNC: 7.3 G/DL (ref 6.4–8.4)
SODIUM SERPL-SCNC: 135 MMOL/L (ref 135–147)
TRIGL SERPL-MCNC: 211 MG/DL

## 2023-03-29 DIAGNOSIS — E11.9 TYPE 2 DIABETES MELLITUS WITHOUT COMPLICATION, WITHOUT LONG-TERM CURRENT USE OF INSULIN (HCC): ICD-10-CM

## 2023-03-30 RX ORDER — PROCHLORPERAZINE 25 MG/1
1 SUPPOSITORY RECTAL 2 TIMES DAILY
Qty: 1 EACH | Refills: 0 | Status: SHIPPED | OUTPATIENT
Start: 2023-03-30

## 2023-04-03 DIAGNOSIS — K21.9 GASTROESOPHAGEAL REFLUX DISEASE WITHOUT ESOPHAGITIS: ICD-10-CM

## 2023-04-03 DIAGNOSIS — Z79.4 TYPE 2 DIABETES MELLITUS TREATED WITH INSULIN (HCC): ICD-10-CM

## 2023-04-03 DIAGNOSIS — F41.9 ANXIETY: ICD-10-CM

## 2023-04-03 DIAGNOSIS — E11.9 TYPE 2 DIABETES MELLITUS WITHOUT COMPLICATION, WITHOUT LONG-TERM CURRENT USE OF INSULIN (HCC): ICD-10-CM

## 2023-04-03 DIAGNOSIS — I82.4Y9 DEEP VEIN THROMBOSIS (DVT) OF PROXIMAL LOWER EXTREMITY, UNSPECIFIED CHRONICITY, UNSPECIFIED LATERALITY (HCC): ICD-10-CM

## 2023-04-03 DIAGNOSIS — D68.51 FACTOR V LEIDEN MUTATION (HCC): ICD-10-CM

## 2023-04-03 DIAGNOSIS — E11.9 TYPE 2 DIABETES MELLITUS TREATED WITH INSULIN (HCC): ICD-10-CM

## 2023-04-03 DIAGNOSIS — I27.82 CHRONIC PULMONARY EMBOLISM WITHOUT ACUTE COR PULMONALE, UNSPECIFIED PULMONARY EMBOLISM TYPE (HCC): ICD-10-CM

## 2023-04-03 DIAGNOSIS — J30.2 SEASONAL ALLERGIES: ICD-10-CM

## 2023-04-03 LAB
LEFT EYE DIABETIC RETINOPATHY: NORMAL
RIGHT EYE DIABETIC RETINOPATHY: NORMAL

## 2023-04-03 RX ORDER — WARFARIN SODIUM 2.5 MG/1
TABLET ORAL
Qty: 30 TABLET | Refills: 0 | Status: SHIPPED | OUTPATIENT
Start: 2023-04-03

## 2023-04-03 RX ORDER — FAMOTIDINE 40 MG/1
40 TABLET, FILM COATED ORAL
Qty: 90 TABLET | Refills: 0 | Status: SHIPPED | OUTPATIENT
Start: 2023-04-03

## 2023-04-03 RX ORDER — MONTELUKAST SODIUM 10 MG/1
10 TABLET ORAL
Qty: 90 TABLET | Refills: 0 | Status: SHIPPED | OUTPATIENT
Start: 2023-04-03

## 2023-04-03 RX ORDER — WARFARIN SODIUM 10 MG/1
10 TABLET ORAL DAILY
Qty: 90 TABLET | Refills: 0 | Status: SHIPPED | OUTPATIENT
Start: 2023-04-03

## 2023-04-03 RX ORDER — METFORMIN HYDROCHLORIDE 500 MG/1
1000 TABLET, EXTENDED RELEASE ORAL 2 TIMES DAILY WITH MEALS
Qty: 120 TABLET | Refills: 0 | Status: SHIPPED | OUTPATIENT
Start: 2023-04-03

## 2023-04-03 RX ORDER — PROCHLORPERAZINE 25 MG/1
SUPPOSITORY RECTAL
Qty: 3 EACH | Refills: 0 | Status: SHIPPED | OUTPATIENT
Start: 2023-04-03

## 2023-04-03 RX ORDER — FLUOXETINE HYDROCHLORIDE 20 MG/1
20 CAPSULE ORAL DAILY
Qty: 90 CAPSULE | Refills: 0 | Status: SHIPPED | OUTPATIENT
Start: 2023-04-03

## 2023-04-05 DIAGNOSIS — E55.9 VITAMIN D INSUFFICIENCY: Primary | ICD-10-CM

## 2023-04-07 ENCOUNTER — OFFICE VISIT (OUTPATIENT)
Dept: URGENT CARE | Age: 38
End: 2023-04-07

## 2023-04-07 VITALS
DIASTOLIC BLOOD PRESSURE: 88 MMHG | SYSTOLIC BLOOD PRESSURE: 138 MMHG | TEMPERATURE: 97.8 F | RESPIRATION RATE: 18 BRPM | HEART RATE: 86 BPM | OXYGEN SATURATION: 98 %

## 2023-04-07 DIAGNOSIS — H00.012 HORDEOLUM EXTERNUM OF RIGHT LOWER EYELID: Primary | ICD-10-CM

## 2023-04-07 RX ORDER — CIPROFLOXACIN HYDROCHLORIDE 3.5 MG/ML
1 SOLUTION/ DROPS TOPICAL 4 TIMES DAILY
Qty: 5 ML | Refills: 0 | Status: SHIPPED | OUTPATIENT
Start: 2023-04-07

## 2023-04-07 NOTE — PATIENT INSTRUCTIONS
Instill prescribed antibiotic eye drops as directed  Wash hands before and after instillation of drops  Do not share hand towels or wash cloths until symptom resolution  Follow up as needed for persistent or worsening symptoms  Stye   WHAT YOU NEED TO KNOW:   What is a stye? A stye is a lump on the edge or inside of your upper or lower eyelid  A stye usually starts to get better within 1 week and is often gone within 2 weeks  What causes a stye? A stye forms when bacteria causes inflammation and infection of a skin gland or follicle  A follicle is the place at the edge of the eyelid where the eyelash comes out  Styes form more often in children and in people who have an eye problem called blepharitis (eyelid inflammation)  What are the signs and symptoms of a stye? Warmth, redness, and swelling along your eyelid    A painful, pus-filled lump on your eyelid    A gritty feeling in your eye    Tearing more than usual    How is a stye diagnosed and treated? Your healthcare provider will examine your eyelid  Tell your provider about your symptoms and when you first noticed the lump  Antibiotics may be needed to treat the stye  This medicine is given as an ointment to put into your eye  How do I manage a stye? Use warm compresses, as directed  This will help decrease swelling and pain  Wet a clean washcloth with warm water and place it on your eye for 10 to 15 minutes, 3 to 4 times each day, or as directed  Keep your hands away from your eye  This helps to prevent the spread of infection to other parts of the eye  Wash your hands often with soap and dry with a clean towel  Do not squeeze the stye  Do not wear eye makeup while you have a stye  Eye makeup may carry bacteria and cause another stye  Throw away eye makeup and brushes used to apply the makeup  Use new eye makeup after the stye has gone away  Do not share eye makeup with others  What can I do to prevent another stye?   Wash your face and clean your eyelashes every day  Remove eye makeup with makeup remover  This helps to completely remove eye makeup without heavy rubbing  When should I call my doctor? You have redness and discharge around your eye, and your eye pain is getting worse  Your vision changes  The stye has not gone away within 2 weeks  You have questions or concerns about your condition or care  CARE AGREEMENT:   You have the right to help plan your care  Learn about your health condition and how it may be treated  Discuss treatment options with your healthcare providers to decide what care you want to receive  You always have the right to refuse treatment  The above information is an  only  It is not intended as medical advice for individual conditions or treatments  Talk to your doctor, nurse or pharmacist before following any medical regimen to see if it is safe and effective for you  © Copyright [employer] 2022 Information is for End User's use only and may not be sold, redistributed or otherwise used for commercial purposes

## 2023-04-07 NOTE — PROGRESS NOTES
[facility]        NAME: Mayito Conley is a 40 y o  female  : 1985    MRN: [id number]  DATE: 2023  TIME: 1:10 PM    Assessment and Plan   Hordeolum externum of right lower eyelid [H00 012]  1  Hordeolum externum of right lower eyelid  ciprofloxacin (CILOXAN) 0 3 % ophthalmic solution            Patient Instructions       Follow up with PCP in 3-5 days  Proceed to  ER if symptoms worsen  Patient Instructions     Instill prescribed antibiotic eye drops as directed  Wash hands before and after instillation of drops  Do not share hand towels or wash cloths until symptom resolution  Follow up as needed for persistent or worsening symptoms  Stye   WHAT YOU NEED TO KNOW:   What is a stye? A stye is a lump on the edge or inside of your upper or lower eyelid  A stye usually starts to get better within 1 week and is often gone within 2 weeks  What causes a stye? A stye forms when bacteria causes inflammation and infection of a skin gland or follicle  A follicle is the place at the edge of the eyelid where the eyelash comes out  Styes form more often in children and in people who have an eye problem called blepharitis (eyelid inflammation)  What are the signs and symptoms of a stye? • Warmth, redness, and swelling along your eyelid    • A painful, pus-filled lump on your eyelid    • A gritty feeling in your eye    • Tearing more than usual    How is a stye diagnosed and treated? Your healthcare provider will examine your eyelid  Tell your provider about your symptoms and when you first noticed the lump  Antibiotics may be needed to treat the stye  This medicine is given as an ointment to put into your eye  How do I manage a stye? • Use warm compresses, as directed  This will help decrease swelling and pain  Wet a clean washcloth with warm water and place it on your eye for 10 to 15 minutes, 3 to 4 times each day, or as directed  • Keep your hands away from your eye    This helps to prevent the spread of infection to other parts of the eye  Wash your hands often with soap and dry with a clean towel  Do not squeeze the stye  • Do not wear eye makeup while you have a stye  Eye makeup may carry bacteria and cause another stye  Throw away eye makeup and brushes used to apply the makeup  Use new eye makeup after the stye has gone away  Do not share eye makeup with others  What can I do to prevent another stye? Wash your face and clean your eyelashes every day  Remove eye makeup with makeup remover  This helps to completely remove eye makeup without heavy rubbing  When should I call my doctor? • You have redness and discharge around your eye, and your eye pain is getting worse  • Your vision changes  • The stye has not gone away within 2 weeks  • You have questions or concerns about your condition or care  CARE AGREEMENT:   You have the right to help plan your care  Learn about your health condition and how it may be treated  Discuss treatment options with your healthcare providers to decide what care you want to receive  You always have the right to refuse treatment  The above information is an  only  It is not intended as medical advice for individual conditions or treatments  Talk to your doctor, nurse or pharmacist before following any medical regimen to see if it is safe and effective for you  © Copyright [employer] 2022 Information is for End User's use only and may not be sold, redistributed or otherwise used for commercial purposes            Chief Complaint     Chief Complaint   Patient presents with   • Cold Like Symptoms   • Eye Pain     Patient states that yesterday she started with nasal congestion and her right eye started to tear up and now having discomfort in the medial corner of her eye vision 20/25 both eyes with glasses         History of Present Illness       Pt here today with c/o tenderness, redness, right lower more medial eye lid x 1 day   Pt states she is beginning to feel some mild seasonal allergy symptoms with runny nose  No eye pain or pressure  Vision normal    Eye Pain   Associated symptoms include eye redness  Pertinent negatives include no eye discharge, fever, nausea or vomiting  Review of Systems   Review of Systems   Constitutional: Negative for fever  HENT: Negative for congestion, ear pain, sneezing and sore throat  Eyes: Positive for redness  Negative for pain, discharge and visual disturbance  Respiratory: Negative for cough, chest tightness and shortness of breath  Cardiovascular: Negative for chest pain  Gastrointestinal: Negative for abdominal pain, diarrhea, nausea and vomiting  Genitourinary: Negative for decreased urine volume  Musculoskeletal: Negative for myalgias  Allergic/Immunologic: Negative for environmental allergies  Neurological: Negative for dizziness, syncope and headaches  Hematological: Negative for adenopathy  Psychiatric/Behavioral: Negative for sleep disturbance  Current Medications       Current Outpatient Medications:   •  ciprofloxacin (CILOXAN) 0 3 % ophthalmic solution, Administer 1 drop to the right eye 4 (four) times a day X 5 days, Disp: 5 mL, Rfl: 0  •  Continuous Blood Gluc Transmit (Dexcom G6 Transmitter) MISC, Use 1 Units 2 (two) times a day, Disp: 1 each, Rfl: 0  •  Blood Glucose Monitoring Suppl (OneTouch Verio Reflect) w/Device KIT, Check blood sugars twice daily  Please substitute with appropriate alternative as covered by patient's insurance   Dx: E11 65, Disp: 1 kit, Rfl: 0  •  Blood Glucose Monitoring Suppl MISC, test one time daily, Disp: , Rfl:   •  Cholecalciferol (Vitamin D3) 125 MCG (5000 UT) CAPS, Take 1 capsule (5,000 Units total) by mouth in the morning, Disp: 90 capsule, Rfl: 1  •  Continuous Blood Gluc  (Dexcom G6 ) GRISEL, Test blood sugar 2-3 times a day, Disp: 1 each, Rfl: 0  •  Continuous Blood Gluc Sensor (Dexcom G6 Sensor) MISC, Test blood sugar 2-3 times a day, Disp: 3 each, Rfl: 0  •  Continuous Blood Gluc Transmit (Dexcom G6 Transmitter) MISC, Test blood sugar 2-3 times a day, Disp: 1 each, Rfl: 3  •  dulaglutide (Trulicity) 3 TJ/9 4ML injection, Inject 0 5 mL (3 mg total) under the skin once a week, Disp: 2 mL, Rfl: 0  •  Empagliflozin (Jardiance) 10 MG TABS tablet, Take 1 tablet (10 mg total) by mouth in the morning, Disp: 90 tablet, Rfl: 0  •  famotidine (PEPCID) 40 MG tablet, Take 1 tablet (40 mg total) by mouth daily at bedtime, Disp: 90 tablet, Rfl: 0  •  FLUoxetine (PROzac) 20 mg capsule, Take 1 capsule (20 mg total) by mouth daily, Disp: 90 capsule, Rfl: 0  •  glucose blood (OneTouch Verio) test strip, Check blood sugars twice daily  Please substitute with appropriate alternative as covered by patient's insurance  Dx: E11 65, Disp: 200 each, Rfl: 0  •  glucose blood test strip, Use 1 each daily Use as instructed, Disp: 200 strip, Rfl: 1  •  hydrOXYzine HCL (ATARAX) 25 mg tablet, TAKE 1 TABLET (25 MG TOTAL) BY MOUTH EVERY 6 (SIX) HOURS AS NEEDED FOR ANXIETY, Disp: 20 tablet, Rfl: 0  •  Lancets MISC, Inject under the skin daily Check sugar using side of finger against lancet, Disp: 200 each, Rfl: 1  •  levocetirizine (XYZAL) 5 MG tablet, Take 1 tablet (5 mg total) by mouth every evening, Disp: 90 tablet, Rfl: 3  •  metFORMIN (GLUCOPHAGE-XR) 500 mg 24 hr tablet, Take 2 tablets (1,000 mg total) by mouth 2 (two) times a day with meals, Disp: 120 tablet, Rfl: 0  •  montelukast (SINGULAIR) 10 mg tablet, Take 1 tablet (10 mg total) by mouth daily at bedtime, Disp: 90 tablet, Rfl: 0  •  nystatin (MYCOSTATIN) powder, Apply topically 3 (three) times a day Use powder externally as needed for yeast infections (itching) on the vulva (do not use with nystatin ointment), Disp: 15 g, Rfl: 0  •  OneTouch Delica Lancets 86Z MISC, Check blood sugars twice daily   Please substitute with appropriate alternative as covered by patient's insurance  Dx: E11 65, Disp: 200 each, Rfl: 0  •  warfarin (COUMADIN) 10 mg tablet, Take 1 tablet (10 mg total) by mouth daily, Disp: 90 tablet, Rfl: 0  •  warfarin (Coumadin) 2 5 mg tablet, Take 2 5mg of additional coumadin on Tuesday and Friday , Disp: 30 tablet, Rfl: 0    Current Allergies     Allergies as of 04/07/2023 - Reviewed 04/07/2023   Allergen Reaction Noted   • No known allergies  02/20/2018   • Other  08/01/2019            The following portions of the patient's history were reviewed and updated as appropriate: allergies, current medications, past family history, past medical history, past social history, past surgical history and problem list      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[facility] )    • Epistaxis    • Factor V Leiden mutation ([facility] )    • Herpes zoster    • Hypotension    • Menorrhagia    • Morbid obesity with BMI of 45 0-49 9, adult ([facility] ) 8/5/2019       Past Surgical History:   Procedure Laterality Date   • DILATION AND CURETTAGE OF UTERUS     • VENA CAVA FILTER PLACEMENT      INTERRUPTION INFERIOR, GREENFIRELD FILTER PLACEMENT       Family History   Problem Relation Age of Onset   • Diabetes Father    • Diabetes type II Father    • Cancer Family    • Stroke Family    • Heart disease Family    • Hypercoagulant Ability Family    • Factor V Leiden deficiency Family         MUTATION         Medications have been verified  Objective   /88 (BP Location: Right arm, Patient Position: Sitting, Cuff Size: Standard)   Pulse 86   Temp 97 8 °F (36 6 °C)   Resp 18   SpO2 98%   No LMP recorded  Physical Exam     Physical Exam  Vitals reviewed  Constitutional:       General: She is not in acute distress  Appearance: She is well-developed and well-groomed  She is not ill-appearing  HENT:      Head: Normocephalic        Right Ear: Tympanic membrane and ear canal normal       Left Ear: Tympanic membrane and ear canal normal       Nose: Nose normal  Mouth/Throat:      Lips: Pink  Mouth: Mucous membranes are moist       Pharynx: Oropharynx is clear  Eyes:      General: Lids are normal          Right eye: Hordeolum (medial inner lower lid border with mild localized erythema, tenderness and inflammation) present  No discharge  Left eye: No discharge  Extraocular Movements: Extraocular movements intact  Conjunctiva/sclera: Conjunctivae normal    Cardiovascular:      Rate and Rhythm: Regular rhythm  Heart sounds: Normal heart sounds, S1 normal and S2 normal    Pulmonary:      Effort: Pulmonary effort is normal  No tachypnea or respiratory distress  Breath sounds: Normal breath sounds and air entry  No decreased breath sounds, wheezing or rhonchi  Musculoskeletal:      Cervical back: Normal range of motion  Lymphadenopathy:      Cervical: No cervical adenopathy  Skin:     General: Skin is warm and dry  Neurological:      Mental Status: She is alert  Psychiatric:         Behavior: Behavior normal  Behavior is cooperative

## 2023-04-07 NOTE — LETTER
April 7, 2023     Patient: Nereida Andrade   YOB: 1985   Date of Visit: 4/7/2023       To Whom it May Concern:    Alfa Rivera was seen in my clinic on 4/7/2023  She may return to work on 4/8/2023  If you have any questions or concerns, please don't hesitate to call           Sincerely,          DYLAN Kim        CC: No Recipients

## 2023-04-28 DIAGNOSIS — E55.9 VITAMIN D INSUFFICIENCY: ICD-10-CM

## 2023-04-28 DIAGNOSIS — E11.9 TYPE 2 DIABETES MELLITUS WITHOUT COMPLICATION, WITHOUT LONG-TERM CURRENT USE OF INSULIN (HCC): ICD-10-CM

## 2023-04-28 RX ORDER — PROCHLORPERAZINE 25 MG/1
SUPPOSITORY RECTAL
Qty: 3 EACH | Refills: 0 | Status: SHIPPED | OUTPATIENT
Start: 2023-04-28

## 2023-05-11 ENCOUNTER — TELEPHONE (OUTPATIENT)
Dept: FAMILY MEDICINE CLINIC | Facility: CLINIC | Age: 38
End: 2023-05-11

## 2023-05-11 DIAGNOSIS — F41.9 ANXIETY: ICD-10-CM

## 2023-05-12 NOTE — TELEPHONE ENCOUNTER
Let patient know she can increase it to 40mg   If things do not improve she will need an appointment               Patient verbalized understanding

## 2023-05-14 DIAGNOSIS — D68.51 FACTOR V LEIDEN MUTATION (HCC): ICD-10-CM

## 2023-05-14 DIAGNOSIS — I82.4Y9 DEEP VEIN THROMBOSIS (DVT) OF PROXIMAL LOWER EXTREMITY, UNSPECIFIED CHRONICITY, UNSPECIFIED LATERALITY (HCC): ICD-10-CM

## 2023-05-14 DIAGNOSIS — K21.9 GASTROESOPHAGEAL REFLUX DISEASE WITHOUT ESOPHAGITIS: ICD-10-CM

## 2023-05-14 DIAGNOSIS — I27.82 CHRONIC PULMONARY EMBOLISM WITHOUT ACUTE COR PULMONALE, UNSPECIFIED PULMONARY EMBOLISM TYPE (HCC): ICD-10-CM

## 2023-05-15 DIAGNOSIS — I82.4Y9 DEEP VEIN THROMBOSIS (DVT) OF PROXIMAL LOWER EXTREMITY, UNSPECIFIED CHRONICITY, UNSPECIFIED LATERALITY (HCC): ICD-10-CM

## 2023-05-15 DIAGNOSIS — D68.51 FACTOR V LEIDEN MUTATION (HCC): Primary | ICD-10-CM

## 2023-05-15 RX ORDER — FAMOTIDINE 40 MG/1
40 TABLET, FILM COATED ORAL
Qty: 90 TABLET | Refills: 0 | Status: SHIPPED | OUTPATIENT
Start: 2023-05-15

## 2023-05-15 RX ORDER — WARFARIN SODIUM 2.5 MG/1
TABLET ORAL
Qty: 30 TABLET | Refills: 0 | Status: SHIPPED | OUTPATIENT
Start: 2023-05-15

## 2023-05-15 RX ORDER — WARFARIN SODIUM 10 MG/1
10 TABLET ORAL DAILY
Qty: 90 TABLET | Refills: 0 | Status: SHIPPED | OUTPATIENT
Start: 2023-05-15

## 2023-06-02 NOTE — TELEPHONE ENCOUNTER
Patient: Herman Diaz Date: 2023   : 1952 Attending: Anton Kim MD   71 year old male      Current Complaint: Concern for PVE at time of redo valve surgery     Remains afebrile  Being de-instrumented    Review of Systems:  General:  No fever,chills.   Respiratory:  No cough or shortness of breath.  Cardiovascular:  Expected post-op chest pain.  Gastrointestinal:  No diarrhea, nausea or vomiting.  Genitourinary:  No dysuria or frequency.  Skin:  No rash.  Musculoskeletal:  No joint pain or leg swelling.    Reviewed Pertinent: Medications, Surgical History, Radiology and Labs: Vanco, Ceftazidime, Doxy    Vital Last Value 24 Hour Range   Temperature 98.4 °F (36.9 °C) (23 1200) Temp  Min: 97.7 °F (36.5 °C)  Max: 99.5 °F (37.5 °C)   Pulse 66 (23 1200) Pulse  Min: 62  Max: 88   Respiratory (!) 23 (23 1200) Resp  Min: 20  Max: 49   Non-Invasive  Blood Pressure 119/82 (23 1100) No data recorded   Pulse Oximetry 99 % (23 1352) SpO2  Min: 94 %  Max: 99 %   Arterial   Blood Pressure 135/75 (23 1200) Arterial Line BP  Min: 96/57  Max: 158/79     Vital Today Admit   Weight 106.2 kg (234 lb 2.1 oz) (23 06) Weight: 108.8 kg (239 lb 13.8 oz) (23)   Height N/A Height: 5' 8\" (172.7 cm) (23)   BMI N/A BMI (Calculated): 36.47 (23)     Weight over the past 48 Hours:  Patient Vitals for the past 48 hrs:   Weight   23 0230 106.9 kg (235 lb 10.8 oz)   23 0600 106.2 kg (234 lb 2.1 oz)        Intake/Output:  Last Stool Occurrence: 1 (23 1515)    I/O this shift:  In: 1102 [P.O.:600; I.V.:502]  Out: 866 [Urine:855; Chest Tube:11]    I/O last 3 completed shifts:  In: 3087.7 [P.O.:1374; I.V.:981.7; IV Piggyback:732]  Out: 4035 [Urine:4000; Chest Tube:35]    Physical Exam:  VITAL SIGNS:    Visit Vitals  /82   Pulse 66   Temp 98.4 °F (36.9 °C)   Resp (!) 23   Ht 5' 8\" (1.727 m)   Wt 106.2 kg (234 lb 2.1 oz)   SpO2 99%   BMI  Patient is calling because she is going to drop off a form for Corazon to fill out so she can get her Wadeuvia through [facility] network because it is more affordable for her  She has still been taking her metformin and glipizide because she hasn't started the new med  She said once [facility] receives the form it can take 1-4 weeks for her to get her medication  She is wondering if she should have stopped her other meds or continue them until her Sharion Slough comes? Patient also would like to know if she should push out her appointment for 3/25/2020 because she hasn't started the new medication yet? She also was asking her father takes Sharion Slough 25 mg, which is what Corazon prescribed her, if she could take his, even though she said she knows Pauline Johnston will say no to this  Please advise  35.60 kg/m²     General:  Restful.  HEENT:  Anicteric.   Cardiovascular:  Regular rate and rhythm.  Pulmonary:  Clear to auscultation anteriorly.  Abdomen:  Soft, non-tender, non-distended.  Extremities:  Trace edema.  Skin:  No rash.    Laboratory Results:    Lab Results   Component Value Date    SODIUM 143 06/02/2023    POTASSIUM 3.9 06/02/2023    BUN 52 (H) 06/02/2023    CREATININE 1.83 (H) 06/02/2023    GLUCOSE 126 (H) 06/02/2023    WBC 13.6 (H) 06/01/2023    HGB 10.8 (L) 06/01/2023     (L) 06/01/2023    INR 1.3 06/01/2023    PTT 29 06/01/2023    BILIRUBIN 0.8 05/31/2023    AST 46 (H) 05/31/2023    GPT 82 (H) 05/31/2023    ALKPT 55 05/31/2023    ALBUMIN 2.9 (L) 05/31/2023    RESR 17 06/01/2023     CRP (6/1) - 15.0    Urinalysis:    Lab Results   Component Value Date    USPG 1.011 05/27/2023    UTPELC 28 (H) 05/27/2023    UWBC Small (A) 05/27/2023    URBC Negative 05/27/2023    UBILI Negative 05/27/2023    UPH 5.0 05/27/2023     Cultures:  Valve tissue/vegetation (5/31) - pending (16S ordered)    Blood (6/1) - no growth      It does not appear that histopath was sent...    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 Continue Vanco, Fortaz, Doxy  2. Await Cx's/16S studies    Discussed with: Family and Patient     Dr. Yoder prjarod this weekend    Anton Johnson MD (ID - pager [phone])

## 2023-06-05 DIAGNOSIS — E55.9 VITAMIN D INSUFFICIENCY: ICD-10-CM

## 2023-06-05 DIAGNOSIS — E11.9 TYPE 2 DIABETES MELLITUS WITHOUT COMPLICATION, WITHOUT LONG-TERM CURRENT USE OF INSULIN (HCC): ICD-10-CM

## 2023-06-06 DIAGNOSIS — E11.9 TYPE 2 DIABETES MELLITUS WITHOUT COMPLICATION, WITHOUT LONG-TERM CURRENT USE OF INSULIN (HCC): ICD-10-CM

## 2023-06-06 RX ORDER — PROCHLORPERAZINE 25 MG/1
SUPPOSITORY RECTAL
Qty: 3 EACH | Refills: 0 | Status: SHIPPED | OUTPATIENT
Start: 2023-06-06

## 2023-06-12 ENCOUNTER — OFFICE VISIT (OUTPATIENT)
Dept: FAMILY MEDICINE CLINIC | Facility: CLINIC | Age: 38
End: 2023-06-12
Payer: COMMERCIAL

## 2023-06-12 VITALS
SYSTOLIC BLOOD PRESSURE: 96 MMHG | DIASTOLIC BLOOD PRESSURE: 62 MMHG | HEIGHT: 65 IN | BODY MASS INDEX: 40.72 KG/M2 | WEIGHT: 244.4 LBS | HEART RATE: 84 BPM | OXYGEN SATURATION: 98 % | TEMPERATURE: 98.3 F

## 2023-06-12 DIAGNOSIS — F41.1 ANXIETY STATE: ICD-10-CM

## 2023-06-12 DIAGNOSIS — E11.9 TYPE 2 DIABETES MELLITUS WITHOUT COMPLICATION, WITHOUT LONG-TERM CURRENT USE OF INSULIN (HCC): Primary | ICD-10-CM

## 2023-06-12 DIAGNOSIS — I27.82 CHRONIC PULMONARY EMBOLISM WITHOUT ACUTE COR PULMONALE, UNSPECIFIED PULMONARY EMBOLISM TYPE (HCC): ICD-10-CM

## 2023-06-12 DIAGNOSIS — E55.9 VITAMIN D DEFICIENCY: ICD-10-CM

## 2023-06-12 DIAGNOSIS — E78.5 HYPERLIPIDEMIA, UNSPECIFIED HYPERLIPIDEMIA TYPE: ICD-10-CM

## 2023-06-12 DIAGNOSIS — F41.9 ANXIETY: ICD-10-CM

## 2023-06-12 LAB — SL AMB POCT HEMOGLOBIN AIC: 7.2 (ref ?–6.5)

## 2023-06-12 PROCEDURE — 99214 OFFICE O/P EST MOD 30 MIN: CPT | Performed by: FAMILY MEDICINE

## 2023-06-12 PROCEDURE — 83036 HEMOGLOBIN GLYCOSYLATED A1C: CPT | Performed by: FAMILY MEDICINE

## 2023-06-12 RX ORDER — METFORMIN HYDROCHLORIDE 500 MG/1
1000 TABLET, EXTENDED RELEASE ORAL 2 TIMES DAILY WITH MEALS
Qty: 120 TABLET | Refills: 0 | Status: SHIPPED | OUTPATIENT
Start: 2023-06-12

## 2023-06-12 RX ORDER — HYDROXYZINE HYDROCHLORIDE 25 MG/1
25 TABLET, FILM COATED ORAL EVERY 6 HOURS PRN
Qty: 20 TABLET | Refills: 0 | Status: SHIPPED | OUTPATIENT
Start: 2023-06-12

## 2023-06-12 RX ORDER — FLUOXETINE HYDROCHLORIDE 40 MG/1
40 CAPSULE ORAL DAILY
Qty: 90 CAPSULE | Refills: 0 | Status: SHIPPED | OUTPATIENT
Start: 2023-06-12 | End: 2023-09-10

## 2023-06-12 NOTE — ASSESSMENT & PLAN NOTE
Acute flare up  · Patient's niece passed away unexpectantly 2 months ago  · Is tolerating increase in fluoxetine 40mg  · Patient has not needed hydroxyzine   Refill provided

## 2023-06-12 NOTE — ASSESSMENT & PLAN NOTE
Lab Results   Component Value Date    HGBA1C 7 2 (A) 06/12/2023     Improving  · Continue current regimen: trulicity 3mg, jardiance 10mg, and metforming 1000mg daily  · Increase metformin to 1000mg BID  · Diabetic foot exam complete today

## 2023-06-12 NOTE — PROGRESS NOTES
Diabetic Foot Exam    Patient's shoes and socks removed  Right Foot/Ankle   Right Foot Inspection  Skin Exam: skin normal and skin intact  No dry skin, no warmth, no callus, no erythema, no maceration, no abnormal color, no pre-ulcer, no ulcer and no callus  Toe Exam: ROM and strength within normal limits  Sensory   Monofilament testing: intact    Vascular  Capillary refills: < 3 seconds  The right DP pulse is 2+  The right PT pulse is 2+  Left Foot/Ankle  Left Foot Inspection  Skin Exam: skin normal and skin intact  No dry skin, no warmth, no erythema, no maceration, normal color, no pre-ulcer, no ulcer and no callus  Toe Exam: ROM and strength within normal limits  Sensory   Monofilament testing: intact    Vascular  Capillary refills: < 3 seconds  The left DP pulse is 2+  The left PT pulse is 2+  Assign Risk Category  No deformity present  No loss of protective sensation  No weak pulses  Risk: 0             Assessment/Plan:      1  Type 2 diabetes mellitus without complication, without long-term current use of insulin ([facility])  Assessment & Plan:    Lab Results   Component Value Date    HGBA1C 7 2 (A) 06/12/2023     Improving  Continue current regimen: trulicity 3mg, jardiance 10mg, and metforming 1000mg daily  Increase metformin to 1000mg BID  Diabetic foot exam complete today    Orders:  -     POCT hemoglobin A1c  -     metFORMIN (GLUCOPHAGE-XR) 500 mg 24 hr tablet; Take 2 tablets (1,000 mg total) by mouth 2 (two) times a day with meals  -     Comprehensive metabolic panel; Future    2  Chronic pulmonary embolism without acute cor pulmonale, unspecified pulmonary embolism type [facility])  Assessment & Plan:  Due for repeat Pt/INR      3  Anxiety  -     FLUoxetine (PROzac) 40 MG capsule; Take 1 capsule (40 mg total) by mouth daily  -     hydrOXYzine HCL (ATARAX) 25 mg tablet; Take 1 tablet (25 mg total) by mouth every 6 (six) hours as needed for anxiety    4   Anxiety state  Assessment & "Plan:  Acute flare up  Patient's niece passed away unexpectantly 2 months ago  Is tolerating increase in fluoxetine 40mg  Patient has not needed hydroxyzine  Refill provided      5  Vitamin D deficiency  -     Vitamin D 25 hydroxy; Future    6  Hyperlipidemia, unspecified hyperlipidemia type  -     Lipid panel; Future            Subjective:      Patient ID: Prasanna Caputo is a 40 y o  female  HPI    The following portions of the patient's history were reviewed and updated as appropriate: allergies, current medications, past family history, past medical history, past social history, past surgical history and problem list     Review of Systems   Constitutional: Negative for activity change, chills, diaphoresis and fever  HENT: Negative for ear pain, hearing loss, postnasal drip, rhinorrhea, sinus pressure, sinus pain, sneezing and sore throat  Respiratory: Negative for cough, chest tightness, shortness of breath and wheezing  Cardiovascular: Negative for chest pain, palpitations and leg swelling  Gastrointestinal: Negative for abdominal pain, blood in stool, constipation, diarrhea, nausea and vomiting  Genitourinary: Negative for dysuria, frequency, hematuria and urgency  Musculoskeletal: Negative for arthralgias and myalgias  Neurological: Negative for dizziness, syncope, weakness, light-headedness, numbness and headaches  Objective:      BP 96/62 (BP Location: Left arm, Patient Position: Sitting, Cuff Size: Large)   Pulse 84   Temp 98 3 °F (36 8 °C) (Temporal)   Ht 5' 5\" (1 651 m)   Wt 111 kg (244 lb 6 4 oz)   SpO2 98%   BMI 40 67 kg/m²          Physical Exam  Vitals reviewed  Constitutional:       General: She is not in acute distress  Appearance: She is well-developed  She is not diaphoretic  HENT:      Head: Normocephalic and atraumatic  Right Ear: External ear normal       Left Ear: External ear normal       Nose: Nose normal  No congestion        Mouth/Throat:      " Mouth: Mucous membranes are moist       Pharynx: Oropharynx is clear  No oropharyngeal exudate  Eyes:      General: No scleral icterus  Right eye: No discharge  Left eye: No discharge  Conjunctiva/sclera: Conjunctivae normal       Pupils: Pupils are equal, round, and reactive to light  Neck:      Thyroid: No thyromegaly  Vascular: No JVD  Trachea: No tracheal deviation  Cardiovascular:      Rate and Rhythm: Normal rate and regular rhythm  Pulses: no weak pulses          Dorsalis pedis pulses are 2+ on the right side and 2+ on the left side  Posterior tibial pulses are 2+ on the right side and 2+ on the left side  Heart sounds: Normal heart sounds  No murmur heard  No friction rub  No gallop  Pulmonary:      Effort: Pulmonary effort is normal  No respiratory distress  Breath sounds: Normal breath sounds  No wheezing or rales  Chest:      Chest wall: No tenderness  Abdominal:      General: Bowel sounds are normal  There is no distension  Palpations: Abdomen is soft  Tenderness: There is no abdominal tenderness  There is no right CVA tenderness, left CVA tenderness, guarding or rebound  Musculoskeletal:         General: Normal range of motion  Cervical back: Normal range of motion and neck supple  No tenderness  Right lower leg: No edema  Left lower leg: No edema  Feet:      Right foot:      Skin integrity: No ulcer, skin breakdown, erythema, warmth, callus or dry skin  Left foot:      Skin integrity: No ulcer, skin breakdown, erythema, warmth, callus or dry skin  Lymphadenopathy:      Cervical: No cervical adenopathy  Skin:     General: Skin is warm and dry  Neurological:      Mental Status: She is alert and oriented to person, place, and time  Mental status is at baseline  Psychiatric:         Mood and Affect: Mood normal          Behavior: Behavior normal          Thought Content:  Thought content normal  Judgment: Judgment normal

## 2023-06-20 ENCOUNTER — APPOINTMENT (OUTPATIENT)
Dept: LAB | Age: 38
End: 2023-06-20
Payer: COMMERCIAL

## 2023-06-20 DIAGNOSIS — D68.51 FACTOR V LEIDEN MUTATION (HCC): ICD-10-CM

## 2023-06-20 DIAGNOSIS — I82.4Y9 DEEP VEIN THROMBOSIS (DVT) OF PROXIMAL LOWER EXTREMITY, UNSPECIFIED CHRONICITY, UNSPECIFIED LATERALITY (HCC): ICD-10-CM

## 2023-06-20 LAB
INR PPP: 3 (ref 0.84–1.19)
PROTHROMBIN TIME: 31.4 SECONDS (ref 11.6–14.5)

## 2023-06-20 PROCEDURE — 36415 COLL VENOUS BLD VENIPUNCTURE: CPT

## 2023-06-20 PROCEDURE — 85610 PROTHROMBIN TIME: CPT

## 2023-07-03 DIAGNOSIS — E11.9 TYPE 2 DIABETES MELLITUS WITHOUT COMPLICATION, WITHOUT LONG-TERM CURRENT USE OF INSULIN (HCC): ICD-10-CM

## 2023-07-12 DIAGNOSIS — E11.9 TYPE 2 DIABETES MELLITUS WITHOUT COMPLICATION, WITHOUT LONG-TERM CURRENT USE OF INSULIN (HCC): ICD-10-CM

## 2023-07-13 DIAGNOSIS — E11.9 TYPE 2 DIABETES MELLITUS WITHOUT COMPLICATION, WITHOUT LONG-TERM CURRENT USE OF INSULIN (HCC): ICD-10-CM

## 2023-07-13 RX ORDER — PROCHLORPERAZINE 25 MG/1
SUPPOSITORY RECTAL
Qty: 1 EACH | Refills: 0 | Status: SHIPPED | OUTPATIENT
Start: 2023-07-13 | End: 2023-07-19 | Stop reason: SDUPTHER

## 2023-07-13 RX ORDER — PROCHLORPERAZINE 25 MG/1
SUPPOSITORY RECTAL
Qty: 3 EACH | Refills: 0 | Status: SHIPPED | OUTPATIENT
Start: 2023-07-13 | End: 2023-07-19 | Stop reason: SDUPTHER

## 2023-07-19 DIAGNOSIS — E11.9 TYPE 2 DIABETES MELLITUS WITHOUT COMPLICATION, WITHOUT LONG-TERM CURRENT USE OF INSULIN (HCC): ICD-10-CM

## 2023-07-20 RX ORDER — PROCHLORPERAZINE 25 MG/1
SUPPOSITORY RECTAL
Qty: 3 EACH | Refills: 0 | Status: SHIPPED | OUTPATIENT
Start: 2023-07-20

## 2023-07-20 RX ORDER — METFORMIN HYDROCHLORIDE 500 MG/1
1000 TABLET, EXTENDED RELEASE ORAL 2 TIMES DAILY WITH MEALS
Qty: 120 TABLET | Refills: 0 | Status: SHIPPED | OUTPATIENT
Start: 2023-07-20

## 2023-07-20 RX ORDER — PROCHLORPERAZINE 25 MG/1
SUPPOSITORY RECTAL
Qty: 1 EACH | Refills: 0 | Status: SHIPPED | OUTPATIENT
Start: 2023-07-20

## 2023-07-20 RX ORDER — PROCHLORPERAZINE 25 MG/1
1 SUPPOSITORY RECTAL 2 TIMES DAILY
Qty: 1 EACH | Refills: 0 | Status: SHIPPED | OUTPATIENT
Start: 2023-07-20

## 2023-08-07 DIAGNOSIS — E11.9 TYPE 2 DIABETES MELLITUS TREATED WITH INSULIN (HCC): ICD-10-CM

## 2023-08-07 DIAGNOSIS — I82.4Y9 DEEP VEIN THROMBOSIS (DVT) OF PROXIMAL LOWER EXTREMITY, UNSPECIFIED CHRONICITY, UNSPECIFIED LATERALITY (HCC): ICD-10-CM

## 2023-08-07 DIAGNOSIS — J30.2 SEASONAL ALLERGIES: ICD-10-CM

## 2023-08-07 DIAGNOSIS — I27.82 CHRONIC PULMONARY EMBOLISM WITHOUT ACUTE COR PULMONALE, UNSPECIFIED PULMONARY EMBOLISM TYPE (HCC): ICD-10-CM

## 2023-08-07 DIAGNOSIS — Z79.4 TYPE 2 DIABETES MELLITUS TREATED WITH INSULIN (HCC): ICD-10-CM

## 2023-08-07 DIAGNOSIS — F41.9 ANXIETY: ICD-10-CM

## 2023-08-07 DIAGNOSIS — E55.9 VITAMIN D INSUFFICIENCY: ICD-10-CM

## 2023-08-07 DIAGNOSIS — D68.51 FACTOR V LEIDEN MUTATION (HCC): ICD-10-CM

## 2023-08-07 DIAGNOSIS — K21.9 GASTROESOPHAGEAL REFLUX DISEASE WITHOUT ESOPHAGITIS: ICD-10-CM

## 2023-08-08 RX ORDER — FLUOXETINE HYDROCHLORIDE 40 MG/1
40 CAPSULE ORAL DAILY
Qty: 90 CAPSULE | Refills: 0 | Status: SHIPPED | OUTPATIENT
Start: 2023-08-08 | End: 2023-11-06

## 2023-08-08 RX ORDER — WARFARIN SODIUM 2.5 MG/1
TABLET ORAL
Qty: 30 TABLET | Refills: 0 | Status: SHIPPED | OUTPATIENT
Start: 2023-08-08

## 2023-08-08 RX ORDER — FAMOTIDINE 40 MG/1
40 TABLET, FILM COATED ORAL
Qty: 90 TABLET | Refills: 0 | Status: SHIPPED | OUTPATIENT
Start: 2023-08-08

## 2023-08-08 RX ORDER — WARFARIN SODIUM 10 MG/1
10 TABLET ORAL DAILY
Qty: 90 TABLET | Refills: 0 | Status: SHIPPED | OUTPATIENT
Start: 2023-08-08

## 2023-08-08 RX ORDER — MONTELUKAST SODIUM 10 MG/1
10 TABLET ORAL
Qty: 90 TABLET | Refills: 0 | Status: SHIPPED | OUTPATIENT
Start: 2023-08-08

## 2023-08-18 DIAGNOSIS — E11.9 TYPE 2 DIABETES MELLITUS WITHOUT COMPLICATION, WITHOUT LONG-TERM CURRENT USE OF INSULIN (HCC): ICD-10-CM

## 2023-08-18 RX ORDER — METFORMIN HYDROCHLORIDE 500 MG/1
1000 TABLET, EXTENDED RELEASE ORAL 2 TIMES DAILY WITH MEALS
Qty: 120 TABLET | Refills: 0 | Status: SHIPPED | OUTPATIENT
Start: 2023-08-18

## 2023-08-26 DIAGNOSIS — E11.9 TYPE 2 DIABETES MELLITUS WITHOUT COMPLICATION, WITHOUT LONG-TERM CURRENT USE OF INSULIN (HCC): ICD-10-CM

## 2023-08-28 RX ORDER — PROCHLORPERAZINE 25 MG/1
SUPPOSITORY RECTAL
Qty: 3 EACH | Refills: 0 | Status: SHIPPED | OUTPATIENT
Start: 2023-08-28

## 2023-09-02 ENCOUNTER — APPOINTMENT (OUTPATIENT)
Dept: LAB | Age: 38
End: 2023-09-02
Payer: COMMERCIAL

## 2023-09-02 DIAGNOSIS — E55.9 VITAMIN D DEFICIENCY: ICD-10-CM

## 2023-09-02 DIAGNOSIS — E78.5 HYPERLIPIDEMIA, UNSPECIFIED HYPERLIPIDEMIA TYPE: ICD-10-CM

## 2023-09-02 DIAGNOSIS — E11.9 TYPE 2 DIABETES MELLITUS WITHOUT COMPLICATION, WITHOUT LONG-TERM CURRENT USE OF INSULIN (HCC): ICD-10-CM

## 2023-09-02 LAB
25(OH)D3 SERPL-MCNC: 35.7 NG/ML (ref 30–100)
ALBUMIN SERPL BCP-MCNC: 3.8 G/DL (ref 3.5–5)
ALP SERPL-CCNC: 60 U/L (ref 34–104)
ALT SERPL W P-5'-P-CCNC: 14 U/L (ref 7–52)
ANION GAP SERPL CALCULATED.3IONS-SCNC: 7 MMOL/L
AST SERPL W P-5'-P-CCNC: 12 U/L (ref 13–39)
BILIRUB SERPL-MCNC: 0.49 MG/DL (ref 0.2–1)
BUN SERPL-MCNC: 12 MG/DL (ref 5–25)
CALCIUM SERPL-MCNC: 8.6 MG/DL (ref 8.4–10.2)
CHLORIDE SERPL-SCNC: 104 MMOL/L (ref 96–108)
CHOLEST SERPL-MCNC: 233 MG/DL
CO2 SERPL-SCNC: 25 MMOL/L (ref 21–32)
CREAT SERPL-MCNC: 0.58 MG/DL (ref 0.6–1.3)
GFR SERPL CREATININE-BSD FRML MDRD: 117 ML/MIN/1.73SQ M
GLUCOSE P FAST SERPL-MCNC: 132 MG/DL (ref 65–99)
HDLC SERPL-MCNC: 48 MG/DL
LDLC SERPL CALC-MCNC: 152 MG/DL (ref 0–100)
NONHDLC SERPL-MCNC: 185 MG/DL
POTASSIUM SERPL-SCNC: 4 MMOL/L (ref 3.5–5.3)
PROT SERPL-MCNC: 6.4 G/DL (ref 6.4–8.4)
SODIUM SERPL-SCNC: 136 MMOL/L (ref 135–147)
TRIGL SERPL-MCNC: 164 MG/DL

## 2023-09-02 PROCEDURE — 82306 VITAMIN D 25 HYDROXY: CPT

## 2023-09-02 PROCEDURE — 36415 COLL VENOUS BLD VENIPUNCTURE: CPT

## 2023-09-02 PROCEDURE — 80061 LIPID PANEL: CPT

## 2023-09-02 PROCEDURE — 80053 COMPREHEN METABOLIC PANEL: CPT

## 2023-09-26 DIAGNOSIS — E11.9 TYPE 2 DIABETES MELLITUS WITHOUT COMPLICATION, WITHOUT LONG-TERM CURRENT USE OF INSULIN (HCC): ICD-10-CM

## 2023-09-28 RX ORDER — METFORMIN HYDROCHLORIDE 500 MG/1
1000 TABLET, EXTENDED RELEASE ORAL 2 TIMES DAILY WITH MEALS
Qty: 120 TABLET | Refills: 0 | Status: SHIPPED | OUTPATIENT
Start: 2023-09-28

## 2023-10-08 DIAGNOSIS — E11.9 TYPE 2 DIABETES MELLITUS TREATED WITH INSULIN (HCC): ICD-10-CM

## 2023-10-08 DIAGNOSIS — Z79.4 TYPE 2 DIABETES MELLITUS TREATED WITH INSULIN (HCC): ICD-10-CM

## 2023-10-08 DIAGNOSIS — E11.9 TYPE 2 DIABETES MELLITUS WITHOUT COMPLICATION, WITHOUT LONG-TERM CURRENT USE OF INSULIN (HCC): ICD-10-CM

## 2023-10-08 DIAGNOSIS — E55.9 VITAMIN D INSUFFICIENCY: ICD-10-CM

## 2023-10-09 RX ORDER — PROCHLORPERAZINE 25 MG/1
SUPPOSITORY RECTAL
Qty: 3 EACH | Refills: 0 | Status: SHIPPED | OUTPATIENT
Start: 2023-10-09

## 2023-10-09 RX ORDER — PROCHLORPERAZINE 25 MG/1
SUPPOSITORY RECTAL
Qty: 1 EACH | Refills: 0 | Status: SHIPPED | OUTPATIENT
Start: 2023-10-09

## 2023-10-09 RX ORDER — LANCETS 30 GAUGE
EACH MISCELLANEOUS DAILY
Qty: 200 EACH | Refills: 0 | Status: SHIPPED | OUTPATIENT
Start: 2023-10-09

## 2023-10-09 RX ORDER — PROCHLORPERAZINE 25 MG/1
1 SUPPOSITORY RECTAL 2 TIMES DAILY
Qty: 1 EACH | Refills: 0 | Status: SHIPPED | OUTPATIENT
Start: 2023-10-09

## 2023-10-22 DIAGNOSIS — E11.9 TYPE 2 DIABETES MELLITUS WITHOUT COMPLICATION, WITHOUT LONG-TERM CURRENT USE OF INSULIN (HCC): ICD-10-CM

## 2023-10-22 DIAGNOSIS — I82.4Y9 DEEP VEIN THROMBOSIS (DVT) OF PROXIMAL LOWER EXTREMITY, UNSPECIFIED CHRONICITY, UNSPECIFIED LATERALITY (HCC): ICD-10-CM

## 2023-10-22 DIAGNOSIS — E55.9 VITAMIN D INSUFFICIENCY: ICD-10-CM

## 2023-10-25 DIAGNOSIS — E11.9 TYPE 2 DIABETES MELLITUS WITHOUT COMPLICATION, WITHOUT LONG-TERM CURRENT USE OF INSULIN (HCC): ICD-10-CM

## 2023-10-25 RX ORDER — WARFARIN SODIUM 10 MG/1
10 TABLET ORAL DAILY
Qty: 90 TABLET | Refills: 0 | Status: SHIPPED | OUTPATIENT
Start: 2023-10-25

## 2023-10-25 RX ORDER — METFORMIN HYDROCHLORIDE 500 MG/1
1000 TABLET, EXTENDED RELEASE ORAL 2 TIMES DAILY WITH MEALS
Qty: 120 TABLET | Refills: 0 | Status: SHIPPED | OUTPATIENT
Start: 2023-10-25

## 2023-11-03 ENCOUNTER — OFFICE VISIT (OUTPATIENT)
Dept: FAMILY MEDICINE CLINIC | Facility: CLINIC | Age: 38
End: 2023-11-03
Payer: COMMERCIAL

## 2023-11-03 VITALS
BODY MASS INDEX: 42.09 KG/M2 | SYSTOLIC BLOOD PRESSURE: 118 MMHG | OXYGEN SATURATION: 99 % | HEIGHT: 65 IN | DIASTOLIC BLOOD PRESSURE: 88 MMHG | HEART RATE: 94 BPM | TEMPERATURE: 98 F | WEIGHT: 252.6 LBS

## 2023-11-03 DIAGNOSIS — I82.4Y9 DEEP VEIN THROMBOSIS (DVT) OF PROXIMAL LOWER EXTREMITY, UNSPECIFIED CHRONICITY, UNSPECIFIED LATERALITY (HCC): ICD-10-CM

## 2023-11-03 DIAGNOSIS — Z23 ENCOUNTER FOR IMMUNIZATION: ICD-10-CM

## 2023-11-03 DIAGNOSIS — D68.51 FACTOR V LEIDEN MUTATION (HCC): ICD-10-CM

## 2023-11-03 DIAGNOSIS — E66.01 MORBID OBESITY WITH BMI OF 45.0-49.9, ADULT (HCC): ICD-10-CM

## 2023-11-03 DIAGNOSIS — E11.9 TYPE 2 DIABETES MELLITUS WITHOUT COMPLICATION, WITHOUT LONG-TERM CURRENT USE OF INSULIN (HCC): Primary | ICD-10-CM

## 2023-11-03 LAB — SL AMB POCT HEMOGLOBIN AIC: 6.9 (ref ?–6.5)

## 2023-11-03 PROCEDURE — 99214 OFFICE O/P EST MOD 30 MIN: CPT | Performed by: FAMILY MEDICINE

## 2023-11-03 PROCEDURE — 90686 IIV4 VACC NO PRSV 0.5 ML IM: CPT | Performed by: FAMILY MEDICINE

## 2023-11-03 PROCEDURE — 90471 IMMUNIZATION ADMIN: CPT | Performed by: FAMILY MEDICINE

## 2023-11-03 PROCEDURE — 83036 HEMOGLOBIN GLYCOSYLATED A1C: CPT | Performed by: FAMILY MEDICINE

## 2023-11-03 RX ORDER — ACYCLOVIR 400 MG/1
1 TABLET ORAL ONCE
Qty: 1 EACH | Refills: 0 | Status: SHIPPED | OUTPATIENT
Start: 2023-11-03 | End: 2023-11-03

## 2023-11-03 RX ORDER — ACYCLOVIR 400 MG/1
1 TABLET ORAL
Qty: 1 EACH | Refills: 6 | Status: SHIPPED | OUTPATIENT
Start: 2023-11-03

## 2023-11-03 NOTE — ASSESSMENT & PLAN NOTE
Wt Readings from Last 3 Encounters:   11/03/23 115 kg (252 lb 9.6 oz)   06/12/23 111 kg (244 lb 6.4 oz)   04/10/23 118 kg (260 lb)     Waxes and wanes  Patient is educated on the importance of portion control dieting  discussed the importance of exercise and recommend at least 30 minutes, 5 times weekly  discussed benefits of weight loss  Continue Trulicity 3 mg weekly

## 2023-11-03 NOTE — ASSESSMENT & PLAN NOTE
With history of DVT and PE  Patient has been on long-term anticoagulation with Coumadin  She is inconsistent with her INR draws  Will try switching patient to Eliquis or other NOAC  Eliquis ordered. If covered by insurance we will have patient stop Coumadin and check her INR in 1 week.   We will start Eliquis if INR is less than 2  Patient will call if insurance does not cover medication

## 2023-11-03 NOTE — PATIENT INSTRUCTIONS
I would like to switch you from Coumadin to Eliquis. To do that we need to ensure that your PT/INR is less than 2.0 after stopping Coumadin. I am ordering the Eliquis today to see if your insurance would cover it. Once you have the Eliquis in hand, we will stop the Coumadin and recheck the PT/INR in 1 week. If it is less than 2, we can start the Eliquis right then and there.

## 2023-11-03 NOTE — PROGRESS NOTES
Assessment/Plan:      1. Type 2 diabetes mellitus without complication, without long-term current use of insulin ([address])  Assessment & Plan:    Lab Results   Component Value Date    HGBA1C 6.9 (A) 11/03/2023     Well control  Fasting BG range: 120's  Goal A1C <7  Reconciled home medication regimen  Discussed importance of strict carb control diet and weight reduction  Patient is not tolerating Dexcom G6 due to sensor falling off. She would like to try Dexcom G7 as her uncle had good results with this. Up to date with diabetic eye exam and Diabetic foot exam  Follow up in 3 months    Orders:  -     POCT hemoglobin A1c  -     Continuous Blood Gluc  (Dexcom G7 ) GRISEL; Use 1 each 1 (one) time for 1 dose  -     Continuous Blood Gluc Sensor (Dexcom G7 Sensor); Use 1 Device every 10 days    2. Encounter for immunization  -     influenza vaccine, quadrivalent, 0.5 mL, preservative-free, for adult and pediatric patients 6 mos+ (AFLURIA, FLUARIX, FLULAVAL, FLUZONE)  -     Continuous Blood Gluc  (Dexcom G7 ) GRISEL; Use 1 each 1 (one) time for 1 dose  -     Continuous Blood Gluc Sensor (Dexcom G7 Sensor); Use 1 Device every 10 days    3. Factor V Leiden mutation [facility])  Assessment & Plan: With history of DVT and PE  Patient has been on long-term anticoagulation with Coumadin  She is inconsistent with her INR draws  Will try switching patient to Eliquis or other NOAC  Eliquis ordered. If covered by insurance we will have patient stop Coumadin and check her INR in 1 week. We will start Eliquis if INR is less than 2  Patient will call if insurance does not cover medication    Orders:  -     apixaban (ELIQUIS) 5 mg; Take 1 tablet (5 mg total) by mouth 2 (two) times a day  -     Protime-INR; Future    4.  Deep vein thrombosis (DVT) of proximal lower extremity, unspecified chronicity, unspecified laterality ([address])  Assessment & Plan:  See plan for factor V Leiden    Orders:  -     apixaban (ELIQUIS) 5 mg; Take 1 tablet (5 mg total) by mouth 2 (two) times a day  -     Protime-INR; Future    5. Morbid obesity with BMI of 45.0-49.9, adult [facility])  Assessment & Plan:  Wt Readings from Last 3 Encounters:   11/03/23 115 kg (252 lb 9.6 oz)   06/12/23 111 kg (244 lb 6.4 oz)   04/10/23 118 kg (260 lb)     Waxes and wanes  Patient is educated on the importance of portion control dieting  discussed the importance of exercise and recommend at least 30 minutes, 5 times weekly  discussed benefits of weight loss  Continue Trulicity 3 mg weekly              Subjective:      Patient ID: Lanette Alexander is a 45 y.o. female presents today for diabetes follow-up. Patient is doing well and has no concerns today. HPI    The following portions of the patient's history were reviewed and updated as appropriate: allergies, current medications, past family history, past medical history, past social history, past surgical history, and problem list.    Review of Systems   Constitutional:  Negative for activity change, chills, diaphoresis and fever. HENT:  Negative for ear pain, hearing loss, postnasal drip, rhinorrhea, sinus pressure, sinus pain, sneezing and sore throat. Respiratory:  Negative for cough, chest tightness, shortness of breath and wheezing. Cardiovascular:  Negative for chest pain, palpitations and leg swelling. Gastrointestinal:  Negative for abdominal pain, blood in stool, constipation, diarrhea, nausea and vomiting. Genitourinary:  Negative for dysuria, frequency, hematuria and urgency. Musculoskeletal:  Negative for arthralgias and myalgias. Neurological:  Negative for dizziness, syncope, weakness, light-headedness, numbness and headaches.          Objective:      /88 (BP Location: Left arm, Patient Position: Sitting, Cuff Size: Adult)   Pulse 94   Temp 98 °F (36.7 °C) (Temporal)   Ht 5' 5" (1.651 m)   Wt 115 kg (252 lb 9.6 oz)   SpO2 99%   BMI 42.03 kg/m²          Physical Exam  Vitals reviewed. Constitutional:       General: She is not in acute distress. Appearance: She is well-developed. She is not diaphoretic. HENT:      Head: Normocephalic and atraumatic. Right Ear: External ear normal.      Left Ear: External ear normal.      Nose: Nose normal. No congestion. Mouth/Throat:      Mouth: Mucous membranes are moist.      Pharynx: Oropharynx is clear. No oropharyngeal exudate. Eyes:      General: No scleral icterus. Right eye: No discharge. Left eye: No discharge. Conjunctiva/sclera: Conjunctivae normal.      Pupils: Pupils are equal, round, and reactive to light. Neck:      Thyroid: No thyromegaly. Vascular: No JVD. Trachea: No tracheal deviation. Cardiovascular:      Rate and Rhythm: Normal rate and regular rhythm. Heart sounds: Normal heart sounds. No murmur heard. No friction rub. No gallop. Pulmonary:      Effort: Pulmonary effort is normal. No respiratory distress. Breath sounds: Normal breath sounds. No wheezing or rales. Chest:      Chest wall: No tenderness. Abdominal:      General: Bowel sounds are normal. There is no distension. Palpations: Abdomen is soft. Tenderness: There is no abdominal tenderness. There is no right CVA tenderness, left CVA tenderness, guarding or rebound. Musculoskeletal:         General: Normal range of motion. Cervical back: Normal range of motion and neck supple. No tenderness. Right lower leg: No edema. Left lower leg: No edema. Lymphadenopathy:      Cervical: No cervical adenopathy. Skin:     General: Skin is warm and dry. Neurological:      Mental Status: She is alert and oriented to person, place, and time. Mental status is at baseline. Psychiatric:         Mood and Affect: Mood normal.         Behavior: Behavior normal.         Thought Content:  Thought content normal.         Judgment: Judgment normal.

## 2023-11-03 NOTE — ASSESSMENT & PLAN NOTE
Lab Results   Component Value Date    HGBA1C 6.9 (A) 11/03/2023     Well control  Fasting BG range: 120's  Goal A1C <7  Reconciled home medication regimen  Discussed importance of strict carb control diet and weight reduction  Patient is not tolerating Dexcom G6 due to sensor falling off. She would like to try Dexcom G7 as her uncle had good results with this.   Up to date with diabetic eye exam and Diabetic foot exam  Follow up in 3 months

## 2023-11-04 DIAGNOSIS — K21.9 GASTROESOPHAGEAL REFLUX DISEASE WITHOUT ESOPHAGITIS: ICD-10-CM

## 2023-11-04 DIAGNOSIS — E11.9 TYPE 2 DIABETES MELLITUS TREATED WITH INSULIN (HCC): ICD-10-CM

## 2023-11-04 DIAGNOSIS — Z79.4 TYPE 2 DIABETES MELLITUS TREATED WITH INSULIN (HCC): ICD-10-CM

## 2023-11-04 DIAGNOSIS — J30.2 SEASONAL ALLERGIES: ICD-10-CM

## 2023-11-06 ENCOUNTER — APPOINTMENT (OUTPATIENT)
Dept: LAB | Facility: AMBULARY SURGERY CENTER | Age: 38
End: 2023-11-06
Payer: COMMERCIAL

## 2023-11-06 DIAGNOSIS — D68.51 FACTOR V LEIDEN MUTATION (HCC): ICD-10-CM

## 2023-11-06 DIAGNOSIS — I82.4Y9 DEEP VEIN THROMBOSIS (DVT) OF PROXIMAL LOWER EXTREMITY, UNSPECIFIED CHRONICITY, UNSPECIFIED LATERALITY (HCC): ICD-10-CM

## 2023-11-06 LAB
INR PPP: 1.35 (ref 0.84–1.19)
PROTHROMBIN TIME: 16.5 SECONDS (ref 11.6–14.5)

## 2023-11-06 PROCEDURE — 85610 PROTHROMBIN TIME: CPT

## 2023-11-06 PROCEDURE — 36415 COLL VENOUS BLD VENIPUNCTURE: CPT

## 2023-11-06 RX ORDER — MONTELUKAST SODIUM 10 MG/1
10 TABLET ORAL
Qty: 90 TABLET | Refills: 0 | Status: SHIPPED | OUTPATIENT
Start: 2023-11-06

## 2023-11-06 RX ORDER — FAMOTIDINE 40 MG/1
40 TABLET, FILM COATED ORAL
Qty: 90 TABLET | Refills: 1 | Status: SHIPPED | OUTPATIENT
Start: 2023-11-06

## 2023-11-26 DIAGNOSIS — E11.9 TYPE 2 DIABETES MELLITUS WITHOUT COMPLICATION, WITHOUT LONG-TERM CURRENT USE OF INSULIN (HCC): ICD-10-CM

## 2023-12-01 DIAGNOSIS — E11.9 TYPE 2 DIABETES MELLITUS WITHOUT COMPLICATION, WITHOUT LONG-TERM CURRENT USE OF INSULIN (HCC): ICD-10-CM

## 2023-12-06 DIAGNOSIS — E11.9 TYPE 2 DIABETES MELLITUS WITHOUT COMPLICATION, WITHOUT LONG-TERM CURRENT USE OF INSULIN (HCC): ICD-10-CM

## 2023-12-06 DIAGNOSIS — D68.51 FACTOR V LEIDEN MUTATION (HCC): ICD-10-CM

## 2023-12-06 DIAGNOSIS — I82.4Y9 DEEP VEIN THROMBOSIS (DVT) OF PROXIMAL LOWER EXTREMITY, UNSPECIFIED CHRONICITY, UNSPECIFIED LATERALITY (HCC): ICD-10-CM

## 2023-12-07 RX ORDER — METFORMIN HYDROCHLORIDE 500 MG/1
1000 TABLET, EXTENDED RELEASE ORAL 2 TIMES DAILY WITH MEALS
Qty: 120 TABLET | Refills: 0 | Status: SHIPPED | OUTPATIENT
Start: 2023-12-07

## 2023-12-08 DIAGNOSIS — E11.9 TYPE 2 DIABETES MELLITUS WITHOUT COMPLICATION, WITHOUT LONG-TERM CURRENT USE OF INSULIN (HCC): ICD-10-CM

## 2023-12-08 DIAGNOSIS — F41.9 ANXIETY: ICD-10-CM

## 2023-12-08 RX ORDER — FLUOXETINE HYDROCHLORIDE 40 MG/1
40 CAPSULE ORAL DAILY
Qty: 90 CAPSULE | Refills: 0 | Status: SHIPPED | OUTPATIENT
Start: 2023-12-08 | End: 2024-03-07

## 2023-12-11 ENCOUNTER — OCCMED (OUTPATIENT)
Dept: URGENT CARE | Age: 38
End: 2023-12-11
Payer: OTHER MISCELLANEOUS

## 2023-12-11 ENCOUNTER — APPOINTMENT (OUTPATIENT)
Dept: RADIOLOGY | Age: 38
End: 2023-12-11
Payer: OTHER MISCELLANEOUS

## 2023-12-11 DIAGNOSIS — S69.91XA HAND INJURY, RIGHT, INITIAL ENCOUNTER: ICD-10-CM

## 2023-12-11 DIAGNOSIS — S69.91XA HAND INJURY, RIGHT, INITIAL ENCOUNTER: Primary | ICD-10-CM

## 2023-12-11 PROCEDURE — 99283 EMERGENCY DEPT VISIT LOW MDM: CPT | Performed by: NURSE PRACTITIONER

## 2023-12-11 PROCEDURE — G0382 LEV 3 HOSP TYPE B ED VISIT: HCPCS | Performed by: NURSE PRACTITIONER

## 2023-12-11 PROCEDURE — 73130 X-RAY EXAM OF HAND: CPT

## 2023-12-18 ENCOUNTER — APPOINTMENT (OUTPATIENT)
Dept: URGENT CARE | Age: 38
End: 2023-12-18
Payer: OTHER MISCELLANEOUS

## 2023-12-18 PROCEDURE — 99213 OFFICE O/P EST LOW 20 MIN: CPT | Performed by: NURSE PRACTITIONER

## 2024-01-06 DIAGNOSIS — D68.51 FACTOR V LEIDEN MUTATION (HCC): ICD-10-CM

## 2024-01-06 DIAGNOSIS — E11.9 TYPE 2 DIABETES MELLITUS WITHOUT COMPLICATION, WITHOUT LONG-TERM CURRENT USE OF INSULIN (HCC): ICD-10-CM

## 2024-01-06 DIAGNOSIS — I82.4Y9 DEEP VEIN THROMBOSIS (DVT) OF PROXIMAL LOWER EXTREMITY, UNSPECIFIED CHRONICITY, UNSPECIFIED LATERALITY (HCC): ICD-10-CM

## 2024-01-08 RX ORDER — METFORMIN HYDROCHLORIDE 500 MG/1
1000 TABLET, EXTENDED RELEASE ORAL 2 TIMES DAILY WITH MEALS
Qty: 120 TABLET | Refills: 0 | Status: SHIPPED | OUTPATIENT
Start: 2024-01-08

## 2024-01-16 ENCOUNTER — OFFICE VISIT (OUTPATIENT)
Dept: URGENT CARE | Age: 39
End: 2024-01-16
Payer: COMMERCIAL

## 2024-01-16 VITALS
OXYGEN SATURATION: 98 % | TEMPERATURE: 97 F | DIASTOLIC BLOOD PRESSURE: 80 MMHG | RESPIRATION RATE: 20 BRPM | SYSTOLIC BLOOD PRESSURE: 124 MMHG | HEART RATE: 105 BPM

## 2024-01-16 DIAGNOSIS — R05.1 ACUTE COUGH: ICD-10-CM

## 2024-01-16 DIAGNOSIS — B34.9 VIRAL INFECTION: Primary | ICD-10-CM

## 2024-01-16 LAB
SARS-COV-2 AG UPPER RESP QL IA: NEGATIVE
VALID CONTROL: NORMAL

## 2024-01-16 PROCEDURE — 99213 OFFICE O/P EST LOW 20 MIN: CPT

## 2024-01-16 PROCEDURE — 87811 SARS-COV-2 COVID19 W/OPTIC: CPT

## 2024-01-16 NOTE — PROGRESS NOTES
[facility]        NAME: Angelica Nolen is a 38 y.o. female  : 1985    MRN: [id number]  DATE: 2024  TIME: 11:28 AM    Assessment and Plan   Acute cough [R05.1]  1. Acute cough  Poct Covid 19 Rapid Antigen Test        First nurse protocol in office POC COVID test: Negative    Given patient's physical exam findings of bilateral nasal turbinate swelling, bilateral clear breath sounds, negative cervical lymphadenopathy, will treat for viral illness.  Discussed with patient symptom management, stay home until fever free for 24 hours without fever reducing medication, continue to mask with cough, rest, fluids, Tyle Motrin for pain and fever.  Patient verbalized understanding of same will follow-up with PCP in 5 to 7 days if no improvement.  Work note provided per patient request.    Patient Instructions       Follow up with PCP in 3-5 days.  Proceed to  ER if symptoms worsen.    Chief Complaint     Chief Complaint   Patient presents with   • Cough     Reports symptoms over the last two days. Feeling worse today than yesterday. Took nightquil yesterday. Productive cough. No fevers reported. Sinus pressure.   • Fatigue   • Nasal Congestion         History of Present Illness       Patient is a 38-year-old female presenting with a 2-day history of cough, congestion, sore throat.  Patient denies fever, headaches, chest pain, shortness of breath, nausea vomiting or diarrhea.  Patient reports numerous sick contacts at work.  Patient reports taking DayQuil and NyQuil with moderate relief.  Patient states she missed work yesterday and left early today due to not feeling well.    Cough    Fatigue  Associated symptoms include coughing and fatigue.       Review of Systems   Review of Systems   Constitutional:  Positive for fatigue.   Respiratory:  Positive for cough.          Current Medications       Current Outpatient Medications:   •  apixaban (ELIQUIS) 5 mg, Take 1 tablet (5 mg total) by mouth 2 (two)  times a day, Disp: 180 tablet, Rfl: 0  •  Blood Glucose Monitoring Suppl (OneTouch Verio Reflect) w/Device KIT, Check blood sugars twice daily. Please substitute with appropriate alternative as covered by patient's insurance. Dx: E11.65, Disp: 1 kit, Rfl: 0  •  Blood Glucose Monitoring Suppl MISC, Inject under the skin in the morning, Disp: 100 each, Rfl: 1  •  Cholecalciferol (Vitamin D3) 125 MCG (5000 UT) CAPS, Take 1 capsule (5,000 Units total) by mouth in the morning, Disp: 90 capsule, Rfl: 0  •  ciprofloxacin (CILOXAN) 0.3 % ophthalmic solution, Administer 1 drop to the right eye 4 (four) times a day X 5 days, Disp: 5 mL, Rfl: 0  •  Continuous Blood Gluc Sensor (Dexcom G7 Sensor), Use 1 Device every 10 days, Disp: 1 each, Rfl: 6  •  Continuous Blood Gluc Transmit (Dexcom G6 Transmitter) MISC, Test blood sugar 2-3 times a day, Disp: 1 each, Rfl: 3  •  Continuous Blood Gluc Transmit (Dexcom G6 Transmitter) MISC, Use 1 Units 2 (two) times a day, Disp: 1 each, Rfl: 0  •  dulaglutide (Trulicity) 3 MG/0.5ML injection, Inject 0.5 mL (3 mg total) under the skin once a week, Disp: 2 mL, Rfl: 0  •  Empagliflozin (Jardiance) 10 MG TABS tablet, Take 1 tablet (10 mg total) by mouth in the morning, Disp: 90 tablet, Rfl: 0  •  famotidine (PEPCID) 40 MG tablet, Take 1 tablet (40 mg total) by mouth daily at bedtime, Disp: 90 tablet, Rfl: 1  •  FLUoxetine (PROzac) 40 MG capsule, Take 1 capsule (40 mg total) by mouth daily, Disp: 90 capsule, Rfl: 0  •  glucose blood (OneTouch Verio) test strip, Check blood sugars twice daily. Please substitute with appropriate alternative as covered by patient's insurance. Dx: E11.65, Disp: 200 each, Rfl: 0  •  glucose blood test strip, Use 1 each daily Use as instructed, Disp: 200 strip, Rfl: 0  •  hydrOXYzine HCL (ATARAX) 25 mg tablet, Take 1 tablet (25 mg total) by mouth every 6 (six) hours as needed for anxiety, Disp: 20 tablet, Rfl: 0  •  Lancets MISC, Inject under the skin daily Check sugar  using side of finger against lancet, Disp: 200 each, Rfl: 0  •  levocetirizine (XYZAL) 5 MG tablet, Take 1 tablet (5 mg total) by mouth every evening, Disp: 90 tablet, Rfl: 3  •  metFORMIN (GLUCOPHAGE-XR) 500 mg 24 hr tablet, Take 2 tablets (1,000 mg total) by mouth 2 (two) times a day with meals, Disp: 120 tablet, Rfl: 0  •  montelukast (SINGULAIR) 10 mg tablet, Take 1 tablet (10 mg total) by mouth daily at bedtime, Disp: 90 tablet, Rfl: 0  •  OneTouch Delica Lancets 33G MISC, Check blood sugars twice daily. Please substitute with appropriate alternative as covered by patient's insurance. Dx: E11.65, Disp: 200 each, Rfl: 0    Current Allergies     Allergies as of 01/16/2024 - Reviewed 01/16/2024   Allergen Reaction Noted   • No known allergies  02/20/2018   • Other  08/01/2019            The following portions of the patient's history were reviewed and updated as appropriate: allergies, current medications, past family history, past medical history, past social history, past surgical history and problem list.     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[facility])    • Epistaxis    • Factor V Leiden mutation ([facility])    • Herpes zoster    • Hypotension    • Menorrhagia    • Morbid obesity with BMI of 45.0-49.9, adult ([facility]) 8/5/2019       Past Surgical History:   Procedure Laterality Date   • DILATION AND CURETTAGE OF UTERUS     • VENA CAVA FILTER PLACEMENT      INTERRUPTION INFERIOR, GREENFIRELD FILTER PLACEMENT       Family History   Problem Relation Age of Onset   • Diabetes Father    • Diabetes type II Father    • Cancer Family    • Stroke Family    • Heart disease Family    • Hypercoagulant Ability Family    • Factor V Leiden deficiency Family         MUTATION         Medications have been verified.        Objective   /80   Pulse 105   Temp (!) 97 °F (36.1 °C) (Temporal)   Resp 20   SpO2 98%   No LMP recorded.       Physical Exam     Physical Exam  Vitals and nursing note  reviewed.   Constitutional:       General: She is not in acute distress.     Appearance: Normal appearance. She is well-developed, well-groomed and normal weight. She is not ill-appearing.   HENT:      Head: Normocephalic and atraumatic.      Right Ear: Tympanic membrane normal.      Left Ear: Tympanic membrane normal.      Nose: Congestion and rhinorrhea present.      Right Turbinates: Enlarged.      Left Turbinates: Enlarged.      Mouth/Throat:      Mouth: Mucous membranes are moist.      Pharynx: Oropharyngeal exudate and posterior oropharyngeal erythema present.   Eyes:      General: Lids are normal.      Extraocular Movements: Extraocular movements intact.      Conjunctiva/sclera: Conjunctivae normal.   Cardiovascular:      Rate and Rhythm: Normal rate and regular rhythm.      Pulses: Normal pulses.      Heart sounds: Normal heart sounds.   Pulmonary:      Effort: Pulmonary effort is normal.      Breath sounds: Normal breath sounds.   Abdominal:      General: Bowel sounds are normal.      Palpations: Abdomen is soft.   Skin:     General: Skin is warm and dry.      Capillary Refill: Capillary refill takes less than 2 seconds.   Neurological:      Mental Status: She is alert and oriented to person, place, and time.   Psychiatric:         Attention and Perception: Attention and perception normal.         Speech: Speech normal.         Behavior: Behavior is cooperative.

## 2024-01-16 NOTE — LETTER
January 16, 2024     Patient: Angelica Nolen   YOB: 1985   Date of Visit: 1/16/2024       To Whom it May Concern:    Angelica Nolen was seen in my clinic on 1/16/2024. She may return to work on 1/17/2024 .    If you have any questions or concerns, please don't hesitate to call.         Sincerely,          DYLAN Arzate        CC: No Recipients

## 2024-01-16 NOTE — PATIENT INSTRUCTIONS
Your COVID test in office was NEGATIVE today.    Nearly all of upper respiratory infections in adults are the result of viruses. These viruses include COVID, flu, RSV, adenoviruses and other coronaviruses. Antibiotics do not treat viruses and are not recommended or indicated at this time.   Take over the counter medications as needed.   Recommend over the counter decongestants as needed and age appropriate.   Neti Pot rinses and saline nasal sprays may also help clear nasal and/or sinus congestion. Frequent suctioning (before/after naps and nighttime sleep) can help symptoms in infants and young children  Recommend Mucinex to assist in the break-up of chest congestion in adults. Recommend Zarbee's cold over the counter treatments for young children (under the age of 2).   Also may consider over the counter allergy medications such as Allegra-D and Zyrtec.   If symptoms persist for 7+ days, follow-up with primary care provider or return to clinic to discuss appropriateness of antibiotics.   Vaccination is important to help prevent the spread of disease and infants. Vaccines are available for COVID and influenza for ages 6 months and older. Please discuss scheduling vaccines with your PCP.    If symptoms worsen, shortness of breath develops, you experience severe sinus pain, difficulty swallowing or changes in voice, report to the emergency department.    RSV: When It's More Than Just a Cold - [email or web address]

## 2024-01-24 DIAGNOSIS — E11.9 TYPE 2 DIABETES MELLITUS WITHOUT COMPLICATION, WITHOUT LONG-TERM CURRENT USE OF INSULIN (HCC): ICD-10-CM

## 2024-01-24 DIAGNOSIS — Z23 ENCOUNTER FOR IMMUNIZATION: ICD-10-CM

## 2024-01-24 DIAGNOSIS — Z79.4 TYPE 2 DIABETES MELLITUS TREATED WITH INSULIN (HCC): ICD-10-CM

## 2024-01-24 DIAGNOSIS — K21.9 GASTROESOPHAGEAL REFLUX DISEASE WITHOUT ESOPHAGITIS: ICD-10-CM

## 2024-01-24 DIAGNOSIS — J30.2 SEASONAL ALLERGIES: ICD-10-CM

## 2024-01-24 DIAGNOSIS — E11.9 TYPE 2 DIABETES MELLITUS TREATED WITH INSULIN (HCC): ICD-10-CM

## 2024-01-24 RX ORDER — MONTELUKAST SODIUM 10 MG/1
10 TABLET ORAL
Qty: 90 TABLET | Refills: 1 | Status: SHIPPED | OUTPATIENT
Start: 2024-01-24

## 2024-01-24 RX ORDER — ACYCLOVIR 400 MG/1
1 TABLET ORAL
Qty: 1 EACH | Refills: 3 | Status: SHIPPED | OUTPATIENT
Start: 2024-01-24

## 2024-01-24 RX ORDER — FAMOTIDINE 40 MG/1
40 TABLET, FILM COATED ORAL
Qty: 90 TABLET | Refills: 1 | Status: SHIPPED | OUTPATIENT
Start: 2024-01-24

## 2024-01-31 DIAGNOSIS — E11.9 TYPE 2 DIABETES MELLITUS WITHOUT COMPLICATION, WITHOUT LONG-TERM CURRENT USE OF INSULIN (HCC): ICD-10-CM

## 2024-01-31 DIAGNOSIS — E11.9 TYPE 2 DIABETES MELLITUS TREATED WITH INSULIN (HCC): ICD-10-CM

## 2024-01-31 DIAGNOSIS — Z79.4 TYPE 2 DIABETES MELLITUS TREATED WITH INSULIN (HCC): ICD-10-CM

## 2024-01-31 DIAGNOSIS — F41.9 ANXIETY: ICD-10-CM

## 2024-01-31 DIAGNOSIS — E55.9 VITAMIN D INSUFFICIENCY: ICD-10-CM

## 2024-02-01 RX ORDER — METFORMIN HYDROCHLORIDE 500 MG/1
1000 TABLET, EXTENDED RELEASE ORAL 2 TIMES DAILY WITH MEALS
Qty: 120 TABLET | Refills: 5 | Status: SHIPPED | OUTPATIENT
Start: 2024-02-01

## 2024-02-01 RX ORDER — LANCETS 30 GAUGE
EACH MISCELLANEOUS DAILY
Qty: 200 EACH | Refills: 0 | Status: SHIPPED | OUTPATIENT
Start: 2024-02-01

## 2024-02-01 RX ORDER — FLUOXETINE HYDROCHLORIDE 40 MG/1
40 CAPSULE ORAL DAILY
Qty: 90 CAPSULE | Refills: 0 | Status: SHIPPED | OUTPATIENT
Start: 2024-02-01 | End: 2024-05-01

## 2024-02-16 DIAGNOSIS — E11.9 TYPE 2 DIABETES MELLITUS WITHOUT COMPLICATION, WITHOUT LONG-TERM CURRENT USE OF INSULIN (HCC): ICD-10-CM

## 2024-02-16 DIAGNOSIS — E55.9 VITAMIN D INSUFFICIENCY: ICD-10-CM

## 2024-02-21 PROBLEM — Z01.419 GYNECOLOGIC EXAM NORMAL: Status: RESOLVED | Noted: 2018-08-31 | Resolved: 2024-02-21

## 2024-02-24 LAB
LEFT EYE DIABETIC RETINOPATHY: NORMAL
RIGHT EYE DIABETIC RETINOPATHY: NORMAL

## 2024-03-05 DIAGNOSIS — E11.9 TYPE 2 DIABETES MELLITUS WITHOUT COMPLICATION, WITHOUT LONG-TERM CURRENT USE OF INSULIN (HCC): ICD-10-CM

## 2024-03-05 RX ORDER — METFORMIN HYDROCHLORIDE 500 MG/1
1000 TABLET, EXTENDED RELEASE ORAL 2 TIMES DAILY WITH MEALS
Qty: 120 TABLET | Refills: 0 | Status: SHIPPED | OUTPATIENT
Start: 2024-03-05

## 2024-03-12 ENCOUNTER — OFFICE VISIT (OUTPATIENT)
Dept: FAMILY MEDICINE CLINIC | Facility: CLINIC | Age: 39
End: 2024-03-12
Payer: COMMERCIAL

## 2024-03-12 VITALS
DIASTOLIC BLOOD PRESSURE: 80 MMHG | OXYGEN SATURATION: 100 % | HEIGHT: 65 IN | BODY MASS INDEX: 41.88 KG/M2 | HEART RATE: 79 BPM | TEMPERATURE: 98 F | SYSTOLIC BLOOD PRESSURE: 102 MMHG | WEIGHT: 251.4 LBS

## 2024-03-12 DIAGNOSIS — I82.4Y9 DEEP VEIN THROMBOSIS (DVT) OF PROXIMAL LOWER EXTREMITY, UNSPECIFIED CHRONICITY, UNSPECIFIED LATERALITY (HCC): ICD-10-CM

## 2024-03-12 DIAGNOSIS — E78.5 HYPERLIPIDEMIA, UNSPECIFIED HYPERLIPIDEMIA TYPE: ICD-10-CM

## 2024-03-12 DIAGNOSIS — E11.9 TYPE 2 DIABETES MELLITUS WITHOUT COMPLICATION, WITHOUT LONG-TERM CURRENT USE OF INSULIN (HCC): Primary | ICD-10-CM

## 2024-03-12 DIAGNOSIS — F41.9 ANXIETY: ICD-10-CM

## 2024-03-12 DIAGNOSIS — M54.6 ACUTE LEFT-SIDED THORACIC BACK PAIN: ICD-10-CM

## 2024-03-12 DIAGNOSIS — E66.01 MORBID OBESITY WITH BMI OF 45.0-49.9, ADULT (HCC): ICD-10-CM

## 2024-03-12 PROCEDURE — 83036 HEMOGLOBIN GLYCOSYLATED A1C: CPT | Performed by: FAMILY MEDICINE

## 2024-03-12 PROCEDURE — 99214 OFFICE O/P EST MOD 30 MIN: CPT | Performed by: FAMILY MEDICINE

## 2024-03-12 RX ORDER — FLUOXETINE HYDROCHLORIDE 40 MG/1
40 CAPSULE ORAL DAILY
Qty: 90 CAPSULE | Refills: 0 | Status: SHIPPED | OUTPATIENT
Start: 2024-03-12 | End: 2024-06-10

## 2024-03-12 NOTE — PROGRESS NOTES
Assessment/Plan:      1. Type 2 diabetes mellitus without complication, without long-term current use of insulin (HCC)  Assessment & Plan:    Lab Results   Component Value Date    HGBA1C 6.9 (A) 11/03/2023   Slightly worsening  Patient continues to gain weight despite being at max dose ozempic  Remains on metformin and jardiance  Utd with diabetic eye and foot exams  Will switch patient to mounjaro     Orders:  -     POCT hemoglobin A1c  -     Albumin / creatinine urine ratio; Future; Expected date: 06/12/2024  -     Comprehensive metabolic panel; Future; Expected date: 06/12/2024  -     Hemoglobin A1C; Future; Expected date: 06/12/2024  -     tirzepatide (Mounjaro) 2.5 MG/0.5ML; Inject 0.5 mL (2.5 mg total) under the skin every 7 days for 14 days, THEN 1 mL (5 mg total) every 7 days for 14 days.    2. Deep vein thrombosis (DVT) of proximal lower extremity, unspecified chronicity, unspecified laterality ([facility])  Assessment & Plan:  Stable  Patient is tolerating eliquis 5mg daily  No longer on coumadin  Denies bleeding    Orders:  -     Type and screen; Future; Expected date: 06/12/2024    3. Morbid obesity with BMI of 45.0-49.9, adult ([facility])  -     tirzepatide (Mounjaro) 2.5 MG/0.5ML; Inject 0.5 mL (2.5 mg total) under the skin every 7 days for 14 days, THEN 1 mL (5 mg total) every 7 days for 14 days.    4. Hyperlipidemia, unspecified hyperlipidemia type  Assessment & Plan:  Lab Results   Component Value Date    LDLCALC 152 (H) 09/02/2023     Encouraged weight loss and dietary modifications      Orders:  -     Lipid panel; Future; Expected date: 06/12/2024    5. Acute left-sided thoracic back pain  Assessment & Plan:  Recurrent  Tenderness to right rhomboids between right shoulder blade and spine  Muscle spasms present  Referral to chiropractor provided    Orders:  -     Ambulatory Referral to Chiropractic; Future            Subjective:      Patient ID: Angelica Nolen is a 38 y.o. female presents today for  "diabetes follow up. Main complaint today is upper back pain.     HPI    The following portions of the patient's history were reviewed and updated as appropriate: allergies, current medications, past family history, past medical history, past social history, past surgical history, and problem list.    Review of Systems   Constitutional:  Negative for activity change, chills, diaphoresis and fever.   HENT:  Negative for ear pain, hearing loss, postnasal drip, rhinorrhea, sinus pressure, sinus pain, sneezing and sore throat.    Respiratory:  Negative for cough, chest tightness, shortness of breath and wheezing.    Cardiovascular:  Negative for chest pain, palpitations and leg swelling.   Gastrointestinal:  Negative for abdominal pain, blood in stool, constipation, diarrhea, nausea and vomiting.   Genitourinary:  Negative for dysuria, frequency, hematuria and urgency.   Musculoskeletal:  Positive for back pain. Negative for arthralgias and myalgias.   Neurological:  Negative for dizziness, syncope, weakness, light-headedness, numbness and headaches.   Hematological:  Negative for adenopathy.         Objective:      /80 (BP Location: Left arm, Patient Position: Sitting, Cuff Size: Adult)   Pulse 79   Temp 98 °F (36.7 °C) (Temporal)   Ht 5' 5\" (1.651 m)   Wt 114 kg (251 lb 6.4 oz)   SpO2 100%   BMI 41.84 kg/m²          Physical Exam  Vitals reviewed.   Constitutional:       General: She is not in acute distress.     Appearance: She is well-developed. She is not diaphoretic.   HENT:      Head: Normocephalic and atraumatic.      Right Ear: External ear normal.      Left Ear: External ear normal.      Nose: Nose normal. No congestion.      Mouth/Throat:      Mouth: Mucous membranes are moist.      Pharynx: Oropharynx is clear. No oropharyngeal exudate.   Eyes:      General: No scleral icterus.        Right eye: No discharge.         Left eye: No discharge.      Conjunctiva/sclera: Conjunctivae normal.      " Pupils: Pupils are equal, round, and reactive to light.   Neck:      Thyroid: No thyromegaly.      Vascular: No JVD.      Trachea: No tracheal deviation.   Cardiovascular:      Rate and Rhythm: Normal rate and regular rhythm.      Heart sounds: Normal heart sounds. No murmur heard.     No friction rub. No gallop.   Pulmonary:      Effort: Pulmonary effort is normal. No respiratory distress.      Breath sounds: Normal breath sounds. No wheezing or rales.   Chest:      Chest wall: No tenderness.   Abdominal:      General: Bowel sounds are normal. There is no distension.      Palpations: Abdomen is soft.      Tenderness: There is no abdominal tenderness. There is no right CVA tenderness, left CVA tenderness, guarding or rebound.   Musculoskeletal:         General: Normal range of motion.        Arms:       Cervical back: Normal range of motion and neck supple. No tenderness.      Right lower leg: No edema.      Left lower leg: No edema.   Lymphadenopathy:      Cervical: No cervical adenopathy.   Skin:     General: Skin is warm and dry.   Neurological:      Mental Status: She is alert and oriented to person, place, and time. Mental status is at baseline.   Psychiatric:         Mood and Affect: Mood normal.         Behavior: Behavior normal.         Thought Content: Thought content normal.         Judgment: Judgment normal.

## 2024-03-12 NOTE — ASSESSMENT & PLAN NOTE
Lab Results   Component Value Date    HGBA1C 6.9 (A) 11/03/2023   Slightly worsening  Patient continues to gain weight despite being at max dose ozempic  Remains on metformin and jardiance  Utd with diabetic eye and foot exams  Will switch patient to mounjaro

## 2024-03-13 ENCOUNTER — TELEPHONE (OUTPATIENT)
Age: 39
End: 2024-03-13

## 2024-03-13 ENCOUNTER — TELEPHONE (OUTPATIENT)
Dept: ADMINISTRATIVE | Facility: OTHER | Age: 39
End: 2024-03-13

## 2024-03-13 LAB — SL AMB POCT HEMOGLOBIN AIC: 7.3 (ref ?–6.5)

## 2024-03-13 NOTE — TELEPHONE ENCOUNTER
PA for   tirzepatide (Mounjaro) 2.5 MG/0.5ML          Submitted via    [][employer]   [x[employer]Case ID # [id number]   []Faxed to plan   []Other website   []Phone call Case ID #     Office notes sent, clinical questions answered. Awaiting determination    Turnaround time for your insurance to make a decision on your Prior Authorization can take 7-21 business days.

## 2024-03-13 NOTE — ASSESSMENT & PLAN NOTE
Recurrent  Tenderness to right rhomboids between right shoulder blade and spine  Muscle spasms present  Referral to chiropractor provided

## 2024-03-13 NOTE — LETTER
Diabetic Eye Exam Form    Date Requested: 24  Patient: Angelica Nolen  Patient : 1985   Referring Provider: Patricio Diehl DO      DIABETIC Eye Exam Date _______________________________      Type of Exam MUST be documented for Diabetic Eye Exams. Please CHECK ONE.     Retinal Exam       Dilated Retinal Exam       OCT       Optomap-Iris Exam      Fundus Photography       Left Eye - Please check Retinopathy or No Retinopathy        Exam did show retinopathy    Exam did not show retinopathy       Right Eye - Please check Retinopathy or No Retinopathy       Exam did show retinopathy    Exam did not show retinopathy       Comments __________________________________________________________    Practice Providing Exam ______________________________________________    Exam Performed By (print name) _______________________________________      Provider Signature ___________________________________________________      These reports are needed for  compliance.  Please fax this completed form and a copy of the Diabetic Eye Exam report to our office located at [address] [place] [address] as soon as possible via Fax [phone] attention Hipolito: Phone [phone]  We thank you for your assistance in treating our mutual patient.

## 2024-03-13 NOTE — TELEPHONE ENCOUNTER
Upon review of the In Basket request we were able to locate, review, and update the patient chart as requested for Diabetic Eye Exam.    Any additional questions or concerns should be emailed to the Practice Liaisons via the appropriate education email address, please do not reply via In Basket.    Thank you  Hipolito Vitale MA

## 2024-03-13 NOTE — TELEPHONE ENCOUNTER
Upon review of the In Basket request and the patient's chart, initial outreach has been made via fax to facility. Please see Contacts section for details.     Thank you  Hipolito Vitale MA

## 2024-03-13 NOTE — TELEPHONE ENCOUNTER
----- Message from January Rahman sent at 3/13/2024  7:38 AM EDT -----  Regarding: care gap request  03/13/24 7:38 AM    Hello, our patient attached above has had Diabetic Eye Exam completed/performed. Please assist in updating the patient chart by making an External outreach to [facility] facility located in [facility] . The date of service is [id number].    Thank you,  January Rahman PG  PRIMARY CARE [place]

## 2024-03-13 NOTE — ASSESSMENT & PLAN NOTE
Lab Results   Component Value Date    LDLCALC 152 (H) 09/02/2023     Encouraged weight loss and dietary modifications

## 2024-03-14 ENCOUNTER — TELEPHONE (OUTPATIENT)
Age: 39
End: 2024-03-14

## 2024-03-14 NOTE — TELEPHONE ENCOUNTER
Patient seen 3/12/24, stated she mentioned she has been having back pian. Back pian has worsen and radiating to upper flank breast 9/10.      [facility]    Please review and advice  Thank you :)

## 2024-03-14 NOTE — TELEPHONE ENCOUNTER
Patient should try over the counter lidocaine patches and tylenol. If that does not help she will need an appointment. I referred her to chiropractor as well.

## 2024-03-17 ENCOUNTER — OFFICE VISIT (OUTPATIENT)
Dept: URGENT CARE | Age: 39
End: 2024-03-17
Payer: COMMERCIAL

## 2024-03-17 VITALS
HEART RATE: 90 BPM | BODY MASS INDEX: 41.93 KG/M2 | SYSTOLIC BLOOD PRESSURE: 141 MMHG | DIASTOLIC BLOOD PRESSURE: 85 MMHG | RESPIRATION RATE: 20 BRPM | OXYGEN SATURATION: 97 % | WEIGHT: 252 LBS | TEMPERATURE: 96.9 F

## 2024-03-17 DIAGNOSIS — M62.838 MUSCLE SPASM: Primary | ICD-10-CM

## 2024-03-17 DIAGNOSIS — N64.4 BREAST PAIN, LEFT: ICD-10-CM

## 2024-03-17 PROCEDURE — 99213 OFFICE O/P EST LOW 20 MIN: CPT | Performed by: STUDENT IN AN ORGANIZED HEALTH CARE EDUCATION/TRAINING PROGRAM

## 2024-03-17 RX ORDER — CYCLOBENZAPRINE HCL 10 MG
10 TABLET ORAL
Qty: 15 TABLET | Refills: 0 | Status: SHIPPED | OUTPATIENT
Start: 2024-03-17 | End: 2024-04-01

## 2024-03-17 NOTE — PROGRESS NOTES
[facility]        NAME: Angelica Nolen is a 38 y.o. female  : 1985    MRN: [id number]  DATE: 2024  TIME: 1:32 PM    Assessment and Plan   Muscle spasm [M62.838]  1. Muscle spasm  cyclobenzaprine (FLEXERIL) 10 mg tablet      2. Breast pain, left              Patient Instructions   You have a very tight muscle in your back.  I do not see any dangerous signs on your exam of your breast currently.  Your heart and lungs sound good, I am sending in a muscle relaxer to be taken at night before bed to help with your muscle spasm.  You can also follow-up with chiropractor as ordered by your PCP.    Your symptoms are not improving, or you are developing new symptoms such as rash, please follow-up with your PCP.  If you develop any severe/worsening symptoms such as shortness of breath, chest pressure please go to the ER.    Follow up with PCP in 3-5 days.  Proceed to  ER if symptoms worsen.    If tests have been performed at [facility], our office will contact you with results if changes need to be made to the care plan discussed with you at the visit.  [facility]    Chief Complaint     Chief Complaint   Patient presents with    Breast Problem     Patient reported she has a sharp pain under her LT shoulder pain that radiates to her LT breast. Patient reported that she had some itching under beast that she was treating. Patient reported that she developed the back pain on Tuesday the  last week, and the next day is when she was having the breast pain. Patient reported her LT rib is tender to the touch. Patient reported she is not on a hormonal BC b/c of h/o blood clots. Patient checks breasts daily for lumps, however has not felt anything.          History of Present Illness       Patient presents for left-sided thoracic pain and left breast pain.  Seen by PCP on 3/12, she had palpable muscle spasm and was referred to chiropractor at her request.  Since  then, she started to notice pain to her left side and radiating into her left breast.  Feels like a soreness, she checks her breasts daily and has not noticed any new lumps, no rashes.  No nipple discharge.  There is no change in her pain with breathing, no shortness of breath, no calf pain, pain is more dependent on her position.  Pain is worsened by pushing in on certain areas.        Review of Systems   Review of Systems   All other systems reviewed and are negative.        Current Medications       Current Outpatient Medications:     apixaban (ELIQUIS) 5 mg, Take 1 tablet (5 mg total) by mouth 2 (two) times a day, Disp: 180 tablet, Rfl: 0    Blood Glucose Monitoring Suppl (OneTouch Verio Reflect) w/Device KIT, Check blood sugars twice daily. Please substitute with appropriate alternative as covered by patient's insurance. Dx: E11.65, Disp: 1 kit, Rfl: 0    Blood Glucose Monitoring Suppl MISC, Inject under the skin in the morning, Disp: 100 each, Rfl: 1    Continuous Blood Gluc Sensor (Dexcom G7 Sensor), Use 1 Device every 10 days, Disp: 1 each, Rfl: 3    Continuous Blood Gluc Transmit (Dexcom G6 Transmitter) MISC, Test blood sugar 2-3 times a day, Disp: 1 each, Rfl: 3    cyclobenzaprine (FLEXERIL) 10 mg tablet, Take 1 tablet (10 mg total) by mouth daily at bedtime for 15 days, Disp: 15 tablet, Rfl: 0    Empagliflozin (Jardiance) 10 MG TABS tablet, Take 1 tablet (10 mg total) by mouth in the morning, Disp: 90 tablet, Rfl: 1    famotidine (PEPCID) 40 MG tablet, Take 1 tablet (40 mg total) by mouth daily at bedtime, Disp: 90 tablet, Rfl: 1    FLUoxetine (PROzac) 40 MG capsule, Take 1 capsule (40 mg total) by mouth daily, Disp: 90 capsule, Rfl: 0    glucose blood (OneTouch Verio) test strip, Check blood sugars twice daily. Please substitute with appropriate alternative as covered by patient's insurance. Dx: E11.65, Disp: 200 each, Rfl: 0    glucose blood test strip, Use 1 each daily Use as instructed, Disp: 200  strip, Rfl: 0    hydrOXYzine HCL (ATARAX) 25 mg tablet, Take 1 tablet (25 mg total) by mouth every 6 (six) hours as needed for anxiety, Disp: 20 tablet, Rfl: 0    Lancets MISC, Inject under the skin daily Check sugar using side of finger against lancet, Disp: 200 each, Rfl: 0    levocetirizine (XYZAL) 5 MG tablet, Take 1 tablet (5 mg total) by mouth every evening, Disp: 90 tablet, Rfl: 3    metFORMIN (GLUCOPHAGE-XR) 500 mg 24 hr tablet, Take 2 tablets (1,000 mg total) by mouth 2 (two) times a day with meals, Disp: 120 tablet, Rfl: 0    montelukast (SINGULAIR) 10 mg tablet, Take 1 tablet (10 mg total) by mouth daily at bedtime, Disp: 90 tablet, Rfl: 1    OneTouch Delica Lancets 33G MISC, Check blood sugars twice daily. Please substitute with appropriate alternative as covered by patient's insurance. Dx: E11.65, Disp: 200 each, Rfl: 0    tirzepatide (Mounjaro) 2.5 MG/0.5ML, Inject 0.5 mL (2.5 mg total) under the skin every 7 days for 14 days, THEN 1 mL (5 mg total) every 7 days for 14 days., Disp: 2 mL, Rfl: 0    Cholecalciferol (Vitamin D3) 125 MCG (5000 UT) CAPS, Take 1 capsule (5,000 Units total) by mouth in the morning (Patient not taking: Reported on 3/12/2024), Disp: 90 capsule, Rfl: 1    ciprofloxacin (CILOXAN) 0.3 % ophthalmic solution, Administer 1 drop to the right eye 4 (four) times a day X 5 days (Patient not taking: Reported on 3/17/2024), Disp: 5 mL, Rfl: 0    Continuous Blood Gluc Transmit (Dexcom G6 Transmitter) MISC, Use 1 Units 2 (two) times a day, Disp: 1 each, Rfl: 0    Current Allergies     Allergies as of 03/17/2024 - Reviewed 03/17/2024   Allergen Reaction Noted    No known allergies  02/20/2018    Other  08/01/2019            The following portions of the patient's history were reviewed and updated as appropriate: allergies, current medications, past family history, past medical history, past social history, past surgical history and problem list.     Past Medical History:   Diagnosis Date     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HCC)     Epistaxis     Factor V Leiden mutation (HCC)     Herpes zoster     Hypotension     Menorrhagia     Morbid obesity with BMI of 45.0-49.9, adult ([facility]) 8/5/2019       Past Surgical History:   Procedure Laterality Date    DILATION AND CURETTAGE OF UTERUS      VENA CAVA FILTER PLACEMENT      INTERRUPTION INFERIOR, GREENFIRELD FILTER PLACEMENT       Family History   Problem Relation Age of Onset    Diabetes Father     Diabetes type II Father     Cancer Family     Stroke Family     Heart disease Family     Hypercoagulant Ability Family     Factor V Leiden deficiency Family         MUTATION         Medications have been verified.        Objective   /85   Pulse 90   Temp (!) 96.9 °F (36.1 °C) (Temporal)   Resp 20   Wt 114 kg (252 lb)   SpO2 97%   BMI 41.93 kg/m²   No LMP recorded. Patient has had an implant.       Physical Exam     Physical Exam  Vitals and nursing note reviewed.   Constitutional:       General: She is not in acute distress.     Appearance: Normal appearance. She is not ill-appearing or toxic-appearing.   HENT:      Head: Normocephalic and atraumatic.      Right Ear: External ear normal.      Left Ear: External ear normal.      Nose: Nose normal.      Mouth/Throat:      Mouth: Mucous membranes are moist.   Eyes:      Extraocular Movements: Extraocular movements intact.   Cardiovascular:      Rate and Rhythm: Normal rate and regular rhythm.      Heart sounds: Normal heart sounds. No murmur heard.  Pulmonary:      Effort: Pulmonary effort is normal. No respiratory distress.      Breath sounds: Normal breath sounds. No stridor. No wheezing, rhonchi or rales.   Chest:   Breasts:     Right: Normal. No inverted nipple, mass or nipple discharge.      Left: Normal. No inverted nipple, mass or nipple discharge.   Musculoskeletal:         General: Tenderness present. No swelling or deformity.      Right lower leg: No edema.      Left lower leg: No edema.      Comments:  She has palpable hypertonic left thoracic musculature adjacent to her left shoulder blade.  Feels better with palpation.  Some tenderness to left lateral side of her ribs.  No enlarged lymph nodes, no tenderness of axilla, some mild tenderness to left breast.   Skin:     General: Skin is warm and dry.      Capillary Refill: Capillary refill takes less than 2 seconds.      Findings: No rash.      Comments: No rash to affected areas   Neurological:      Mental Status: She is alert.      Gait: Gait normal.   Psychiatric:         Behavior: Behavior normal.

## 2024-03-17 NOTE — PATIENT INSTRUCTIONS
You have a very tight muscle in your back.  I do not see any dangerous signs on your exam of your breast currently.  Your heart and lungs sound good, I am sending in a muscle relaxer to be taken at night before bed to help with your muscle spasm.  You can also follow-up with chiropractor as ordered by your PCP.    Your symptoms are not improving, or you are developing new symptoms such as rash, please follow-up with your PCP.  If you develop any severe/worsening symptoms such as shortness of breath, chest pressure please go to the ER.

## 2024-03-18 ENCOUNTER — TELEPHONE (OUTPATIENT)
Age: 39
End: 2024-03-18

## 2024-03-21 ENCOUNTER — TELEPHONE (OUTPATIENT)
Age: 39
End: 2024-03-21

## 2024-03-21 ENCOUNTER — TELEMEDICINE (OUTPATIENT)
Dept: FAMILY MEDICINE CLINIC | Facility: CLINIC | Age: 39
End: 2024-03-21
Payer: COMMERCIAL

## 2024-03-21 DIAGNOSIS — R21 RASH AND NONSPECIFIC SKIN ERUPTION: Primary | ICD-10-CM

## 2024-03-21 PROCEDURE — 99214 OFFICE O/P EST MOD 30 MIN: CPT | Performed by: FAMILY MEDICINE

## 2024-03-21 RX ORDER — VALACYCLOVIR HYDROCHLORIDE 1 G/1
1000 TABLET, FILM COATED ORAL 3 TIMES DAILY
Qty: 21 TABLET | Refills: 0 | Status: SHIPPED | OUTPATIENT
Start: 2024-03-21 | End: 2024-03-28

## 2024-03-21 NOTE — TELEPHONE ENCOUNTER
Patient wants to confirm wether or not she should be working or not. Patient was just seen for a virtual visit today.

## 2024-03-21 NOTE — PROGRESS NOTES
Virtual Regular Visit    Verification of patient location:    Patient is located at Home in the following state in which I hold an active license [place]      Assessment/Plan:    Problem List Items Addressed This Visit        Musculoskeletal and Integument    Rash and nonspecific skin eruption - Primary     New  Rash onset 24 hours ago  Symptoms and rash are consistent with herpes zoster  Start valacyclovir for 7 days  Treat acute neuritis with tylenol, ibuprofen, and topical lidocaine  Follow up as needed         Relevant Medications    valACYclovir (VALTREX) 1,000 mg tablet            Reason for visit is   Chief Complaint   Patient presents with   • Rash   • Virtual Regular Visit          Encounter provider Patricio Diehl DO    Provider [facility] [address]  [facility] [place] [address]      Recent Visits  No visits were found meeting these conditions.  Showing recent visits within past 7 days and meeting all other requirements  Today's Visits  Date Type Provider Dept   03/21/24 Telemedicine Patricio Diehl DO [facility] [place]   Showing today's visits and meeting all other requirements  Future Appointments  No visits were found meeting these conditions.  Showing future appointments within next 150 days and meeting all other requirements       The patient was identified by name and date of birth. Angelica Nolen was informed that this is a telemedicine visit and that the visit is being conducted through the [employer] Teams platform. She agrees to proceed..  My office door was closed. No one else was in the room.  She acknowledged consent and understanding of privacy and security of the video platform. The patient has agreed to participate and understands they can discontinue the visit at any time.    Patient is aware this is a billable service.     Subjective  Angelica Nolen is a 38 y.o. female presents today  for rash. She was burning and stinging that started 3/13/2024. She then noticed erythematous papules yesterday.       Rash  Pertinent negatives include no cough, diarrhea, fever, rhinorrhea, shortness of breath, sore throat or vomiting.        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[facility])    • Epistaxis    • Factor V Leiden mutation ([facility])    • Herpes zoster    • Hypotension    • Menorrhagia    • Morbid obesity with BMI of 45.0-49.9, adult ([facility]) 8/5/2019       Past Surgical History:   Procedure Laterality Date   • DILATION AND CURETTAGE OF UTERUS     • VENA CAVA FILTER PLACEMENT      INTERRUPTION INFERIOR, GREENFIRELD FILTER PLACEMENT       Current Outpatient Medications   Medication Sig Dispense Refill   • apixaban (ELIQUIS) 5 mg Take 1 tablet (5 mg total) by mouth 2 (two) times a day 180 tablet 0   • Blood Glucose Monitoring Suppl (OneTouch Verio Reflect) w/Device KIT Check blood sugars twice daily. Please substitute with appropriate alternative as covered by patient's insurance. Dx: E11.65 1 kit 0   • Blood Glucose Monitoring Suppl MISC Inject under the skin in the morning 100 each 1   • Continuous Blood Gluc Sensor (Dexcom G7 Sensor) Use 1 Device every 10 days 1 each 3   • Continuous Blood Gluc Transmit (Dexcom G6 Transmitter) MISC Test blood sugar 2-3 times a day 1 each 3   • Continuous Blood Gluc Transmit (Dexcom G6 Transmitter) MISC Use 1 Units 2 (two) times a day 1 each 0   • cyclobenzaprine (FLEXERIL) 10 mg tablet Take 1 tablet (10 mg total) by mouth daily at bedtime for 15 days 15 tablet 0   • Empagliflozin (Jardiance) 10 MG TABS tablet Take 1 tablet (10 mg total) by mouth in the morning 90 tablet 1   • famotidine (PEPCID) 40 MG tablet Take 1 tablet (40 mg total) by mouth daily at bedtime 90 tablet 1   • FLUoxetine (PROzac) 40 MG capsule Take 1 capsule (40 mg total) by mouth daily 90 capsule 0   • glucose blood (OneTouch Verio) test strip Check blood sugars twice daily. Please  substitute with appropriate alternative as covered by patient's insurance. Dx: E11.65 200 each 0   • glucose blood test strip Use 1 each daily Use as instructed 200 strip 0   • hydrOXYzine HCL (ATARAX) 25 mg tablet Take 1 tablet (25 mg total) by mouth every 6 (six) hours as needed for anxiety 20 tablet 0   • Lancets MISC Inject under the skin daily Check sugar using side of finger against lancet 200 each 0   • levocetirizine (XYZAL) 5 MG tablet Take 1 tablet (5 mg total) by mouth every evening 90 tablet 3   • metFORMIN (GLUCOPHAGE-XR) 500 mg 24 hr tablet Take 2 tablets (1,000 mg total) by mouth 2 (two) times a day with meals 120 tablet 0   • montelukast (SINGULAIR) 10 mg tablet Take 1 tablet (10 mg total) by mouth daily at bedtime 90 tablet 1   • OneTouch Delica Lancets 33G MISC Check blood sugars twice daily. Please substitute with appropriate alternative as covered by patient's insurance. Dx: E11.65 200 each 0   • tirzepatide (Mounjaro) 2.5 MG/0.5ML Inject 0.5 mL (2.5 mg total) under the skin every 7 days for 14 days, THEN 1 mL (5 mg total) every 7 days for 14 days. 2 mL 0   • valACYclovir (VALTREX) 1,000 mg tablet Take 1 tablet (1,000 mg total) by mouth 3 (three) times a day for 7 days 21 tablet 0   • Cholecalciferol (Vitamin D3) 125 MCG (5000 UT) CAPS Take 1 capsule (5,000 Units total) by mouth in the morning (Patient not taking: Reported on 3/12/2024) 90 capsule 1   • ciprofloxacin (CILOXAN) 0.3 % ophthalmic solution Administer 1 drop to the right eye 4 (four) times a day X 5 days (Patient not taking: Reported on 3/17/2024) 5 mL 0     No current facility-administered medications for this visit.        Allergies   Allergen Reactions   • No Known Allergies    • Other      seasonal       Review of Systems   Constitutional:  Negative for activity change, chills, diaphoresis and fever.   HENT:  Negative for ear pain, hearing loss, postnasal drip, rhinorrhea, sinus pressure, sinus pain, sneezing and sore throat.     Respiratory:  Negative for cough, chest tightness, shortness of breath and wheezing.    Cardiovascular:  Negative for chest pain, palpitations and leg swelling.   Gastrointestinal:  Negative for abdominal pain, blood in stool, constipation, diarrhea, nausea and vomiting.   Genitourinary:  Negative for dysuria, frequency, hematuria and urgency.   Musculoskeletal:  Negative for arthralgias and myalgias.   Skin:  Positive for rash.   Neurological:  Negative for dizziness, syncope, weakness, light-headedness, numbness and headaches.       Video Exam    There were no vitals filed for this visit.    Physical Exam  Vitals reviewed.   Constitutional:       General: She is not in acute distress.     Appearance: She is not ill-appearing or toxic-appearing.   HENT:      Head: Normocephalic and atraumatic.      Right Ear: External ear normal.      Left Ear: External ear normal.      Nose: No congestion.      Mouth/Throat:      Mouth: Mucous membranes are moist.      Pharynx: Oropharynx is clear.   Eyes:      General: No scleral icterus.        Right eye: No discharge.         Left eye: No discharge.      Conjunctiva/sclera: Conjunctivae normal.   Pulmonary:      Effort: No respiratory distress.   Skin:     Coloration: Skin is not pale.      Findings: Rash present. No erythema. Rash is papular.          Neurological:      Mental Status: She is alert. Mental status is at baseline.   Psychiatric:         Mood and Affect: Mood normal.         Thought Content: Thought content normal.          Visit Time  Total Visit Duration: 20

## 2024-03-21 NOTE — ASSESSMENT & PLAN NOTE
New  Rash onset 24 hours ago  Symptoms and rash are consistent with herpes zoster  Start valacyclovir for 7 days  Treat acute neuritis with tylenol, ibuprofen, and topical lidocaine  Follow up as needed

## 2024-03-21 NOTE — TELEPHONE ENCOUNTER
Patient called to see if ok to work tomorrow, was just seen by PCP for shingles, called over to office, per PCP ok to return to work as long as area is covered, continues to wash her hands, and wear gloves if needed. Patient verbalized understanding and would like to know if PCP could write a letter for her stating that.

## 2024-04-05 DIAGNOSIS — Z79.4 TYPE 2 DIABETES MELLITUS TREATED WITH INSULIN (HCC): ICD-10-CM

## 2024-04-05 DIAGNOSIS — E11.9 TYPE 2 DIABETES MELLITUS TREATED WITH INSULIN (HCC): ICD-10-CM

## 2024-04-05 DIAGNOSIS — J30.2 SEASONAL ALLERGIES: ICD-10-CM

## 2024-04-05 DIAGNOSIS — F41.9 ANXIETY: ICD-10-CM

## 2024-04-05 DIAGNOSIS — E11.9 TYPE 2 DIABETES MELLITUS WITHOUT COMPLICATION, WITHOUT LONG-TERM CURRENT USE OF INSULIN (HCC): ICD-10-CM

## 2024-04-05 DIAGNOSIS — D68.51 FACTOR V LEIDEN MUTATION (HCC): ICD-10-CM

## 2024-04-05 DIAGNOSIS — I82.4Y9 DEEP VEIN THROMBOSIS (DVT) OF PROXIMAL LOWER EXTREMITY, UNSPECIFIED CHRONICITY, UNSPECIFIED LATERALITY (HCC): ICD-10-CM

## 2024-04-05 DIAGNOSIS — K21.9 GASTROESOPHAGEAL REFLUX DISEASE WITHOUT ESOPHAGITIS: ICD-10-CM

## 2024-04-05 RX ORDER — MONTELUKAST SODIUM 10 MG/1
10 TABLET ORAL
Qty: 90 TABLET | Refills: 1 | Status: SHIPPED | OUTPATIENT
Start: 2024-04-05

## 2024-04-05 RX ORDER — FAMOTIDINE 40 MG/1
40 TABLET, FILM COATED ORAL
Qty: 90 TABLET | Refills: 1 | Status: SHIPPED | OUTPATIENT
Start: 2024-04-05

## 2024-04-05 RX ORDER — FLUOXETINE HYDROCHLORIDE 40 MG/1
40 CAPSULE ORAL DAILY
Qty: 90 CAPSULE | Refills: 0 | Status: SHIPPED | OUTPATIENT
Start: 2024-04-05 | End: 2024-07-04

## 2024-04-05 RX ORDER — METFORMIN HYDROCHLORIDE 500 MG/1
1000 TABLET, EXTENDED RELEASE ORAL 2 TIMES DAILY WITH MEALS
Qty: 360 TABLET | Refills: 1 | Status: SHIPPED | OUTPATIENT
Start: 2024-04-05

## 2024-05-12 DIAGNOSIS — D68.51 FACTOR V LEIDEN MUTATION (HCC): ICD-10-CM

## 2024-05-12 DIAGNOSIS — E11.9 TYPE 2 DIABETES MELLITUS TREATED WITH INSULIN (HCC): ICD-10-CM

## 2024-05-12 DIAGNOSIS — J30.2 SEASONAL ALLERGIES: ICD-10-CM

## 2024-05-12 DIAGNOSIS — Z79.4 TYPE 2 DIABETES MELLITUS TREATED WITH INSULIN (HCC): ICD-10-CM

## 2024-05-12 DIAGNOSIS — I82.4Y9 DEEP VEIN THROMBOSIS (DVT) OF PROXIMAL LOWER EXTREMITY, UNSPECIFIED CHRONICITY, UNSPECIFIED LATERALITY (HCC): ICD-10-CM

## 2024-05-14 RX ORDER — MONTELUKAST SODIUM 10 MG/1
10 TABLET ORAL
Qty: 90 TABLET | Refills: 0 | OUTPATIENT
Start: 2024-05-14

## 2024-05-15 DIAGNOSIS — E11.9 TYPE 2 DIABETES MELLITUS TREATED WITH INSULIN (HCC): ICD-10-CM

## 2024-05-15 DIAGNOSIS — Z79.4 TYPE 2 DIABETES MELLITUS TREATED WITH INSULIN (HCC): ICD-10-CM

## 2024-05-15 NOTE — TELEPHONE ENCOUNTER
Patient has been out of this medication for 2 days.    Medication: Jardiance    Dose/Frequency: 10 mg; 1 tablet daily    Quantity: 90    Pharmacy: [facility]    Office:   [x] PCP/Provider -   [] Speciality/Provider -     Does the patient have enough for 3 days?   [] Yes   [x] No - Send as HP to POD

## 2024-05-23 ENCOUNTER — TELEPHONE (OUTPATIENT)
Age: 39
End: 2024-05-23

## 2024-05-23 DIAGNOSIS — E11.9 TYPE 2 DIABETES MELLITUS WITHOUT COMPLICATION, WITHOUT LONG-TERM CURRENT USE OF INSULIN (HCC): Primary | ICD-10-CM

## 2024-05-23 NOTE — TELEPHONE ENCOUNTER
PA for Mounjaro 5mg     Submitted via    []CMM-KEY   [x]Surescripts-Case ID # [id number]   []Faxed to plan   []Other website   []Phone call Case ID #     Office notes sent, clinical questions answered. Awaiting determination    Turnaround time for your insurance to make a decision on your Prior Authorization can take 7-21 business days.

## 2024-05-28 NOTE — TELEPHONE ENCOUNTER
Pt called to follow up on this, I informed her the prior auth was in, she could follow up with her insurance if any concerns.

## 2024-06-06 DIAGNOSIS — Z79.4 TYPE 2 DIABETES MELLITUS TREATED WITH INSULIN (HCC): ICD-10-CM

## 2024-06-06 DIAGNOSIS — E11.9 TYPE 2 DIABETES MELLITUS WITHOUT COMPLICATION, WITHOUT LONG-TERM CURRENT USE OF INSULIN (HCC): ICD-10-CM

## 2024-06-06 DIAGNOSIS — D68.51 FACTOR V LEIDEN MUTATION (HCC): ICD-10-CM

## 2024-06-06 DIAGNOSIS — E55.9 VITAMIN D INSUFFICIENCY: ICD-10-CM

## 2024-06-06 DIAGNOSIS — E11.9 TYPE 2 DIABETES MELLITUS TREATED WITH INSULIN (HCC): ICD-10-CM

## 2024-06-06 DIAGNOSIS — I82.4Y9 DEEP VEIN THROMBOSIS (DVT) OF PROXIMAL LOWER EXTREMITY, UNSPECIFIED CHRONICITY, UNSPECIFIED LATERALITY (HCC): ICD-10-CM

## 2024-06-06 DIAGNOSIS — K21.9 GASTROESOPHAGEAL REFLUX DISEASE WITHOUT ESOPHAGITIS: ICD-10-CM

## 2024-06-07 DIAGNOSIS — F41.9 ANXIETY: ICD-10-CM

## 2024-06-07 RX ORDER — METFORMIN HYDROCHLORIDE 500 MG/1
1000 TABLET, EXTENDED RELEASE ORAL 2 TIMES DAILY WITH MEALS
Qty: 360 TABLET | Refills: 0 | OUTPATIENT
Start: 2024-06-07

## 2024-06-07 RX ORDER — FAMOTIDINE 40 MG/1
40 TABLET, FILM COATED ORAL
Qty: 90 TABLET | Refills: 0 | OUTPATIENT
Start: 2024-06-07

## 2024-06-07 RX ORDER — FLUOXETINE HYDROCHLORIDE 40 MG/1
40 CAPSULE ORAL DAILY
Qty: 90 CAPSULE | Refills: 1 | Status: SHIPPED | OUTPATIENT
Start: 2024-06-07 | End: 2024-12-04

## 2024-06-07 NOTE — TELEPHONE ENCOUNTER
Patient requested refill via [employer] message   Clindamycin Counseling: I counseled the patient regarding use of clindamycin as an antibiotic for prophylactic and/or therapeutic purposes. Clindamycin is active against numerous classes of bacteria, including skin bacteria. Side effects may include nausea, diarrhea, gastrointestinal upset, rash, hives, yeast infections, and in rare cases, colitis.

## 2024-06-26 DIAGNOSIS — E11.9 TYPE 2 DIABETES MELLITUS WITHOUT COMPLICATION, WITHOUT LONG-TERM CURRENT USE OF INSULIN (HCC): ICD-10-CM

## 2024-07-13 ENCOUNTER — PATIENT MESSAGE (OUTPATIENT)
Dept: FAMILY MEDICINE CLINIC | Facility: CLINIC | Age: 39
End: 2024-07-13

## 2024-07-15 DIAGNOSIS — I82.4Y9 DEEP VEIN THROMBOSIS (DVT) OF PROXIMAL LOWER EXTREMITY, UNSPECIFIED CHRONICITY, UNSPECIFIED LATERALITY (HCC): ICD-10-CM

## 2024-07-15 DIAGNOSIS — D68.51 FACTOR V LEIDEN MUTATION (HCC): ICD-10-CM

## 2024-07-22 ENCOUNTER — OFFICE VISIT (OUTPATIENT)
Dept: FAMILY MEDICINE CLINIC | Facility: CLINIC | Age: 39
End: 2024-07-22
Payer: COMMERCIAL

## 2024-07-22 VITALS
TEMPERATURE: 97.5 F | WEIGHT: 245.2 LBS | BODY MASS INDEX: 40.8 KG/M2 | SYSTOLIC BLOOD PRESSURE: 100 MMHG | HEART RATE: 83 BPM | DIASTOLIC BLOOD PRESSURE: 80 MMHG | OXYGEN SATURATION: 97 %

## 2024-07-22 DIAGNOSIS — I27.82 CHRONIC PULMONARY EMBOLISM WITHOUT ACUTE COR PULMONALE, UNSPECIFIED PULMONARY EMBOLISM TYPE (HCC): ICD-10-CM

## 2024-07-22 DIAGNOSIS — E66.01 MORBID OBESITY WITH BMI OF 45.0-49.9, ADULT (HCC): ICD-10-CM

## 2024-07-22 DIAGNOSIS — D68.51 FACTOR V LEIDEN MUTATION (HCC): ICD-10-CM

## 2024-07-22 DIAGNOSIS — F41.1 ANXIETY STATE: ICD-10-CM

## 2024-07-22 DIAGNOSIS — Z00.00 ANNUAL PHYSICAL EXAM: Primary | ICD-10-CM

## 2024-07-22 DIAGNOSIS — E11.9 TYPE 2 DIABETES MELLITUS WITHOUT COMPLICATION, WITHOUT LONG-TERM CURRENT USE OF INSULIN (HCC): ICD-10-CM

## 2024-07-22 DIAGNOSIS — E78.5 HYPERLIPIDEMIA, UNSPECIFIED HYPERLIPIDEMIA TYPE: ICD-10-CM

## 2024-07-22 PROCEDURE — 83036 HEMOGLOBIN GLYCOSYLATED A1C: CPT | Performed by: FAMILY MEDICINE

## 2024-07-22 PROCEDURE — 99395 PREV VISIT EST AGE 18-39: CPT | Performed by: FAMILY MEDICINE

## 2024-07-22 PROCEDURE — 99214 OFFICE O/P EST MOD 30 MIN: CPT | Performed by: FAMILY MEDICINE

## 2024-07-22 RX ORDER — METFORMIN HYDROCHLORIDE 500 MG/1
1000 TABLET, EXTENDED RELEASE ORAL 2 TIMES DAILY WITH MEALS
Qty: 360 TABLET | Refills: 1 | Status: SHIPPED | OUTPATIENT
Start: 2024-07-22

## 2024-07-22 NOTE — PROGRESS NOTES
Diabetic Foot Exam    Patient's shoes and socks removed.    Right Foot/Ankle   Right Foot Inspection  Skin Exam: skin normal and skin intact. No dry skin, no warmth, no callus, no erythema, no maceration, no abnormal color, no pre-ulcer, no ulcer and no callus.     Toe Exam: ROM and strength within normal limits. No swelling, no tenderness, erythema and  no right toe deformity    Sensory   Monofilament testing: intact    Vascular  Capillary refills: < 3 seconds  The right DP pulse is 2+. The right PT pulse is 2+.     Left Foot/Ankle  Left Foot Inspection  Skin Exam: skin normal and skin intact. No dry skin, no warmth, no erythema, no maceration, normal color, no pre-ulcer, no ulcer and no callus.     Toe Exam: ROM and strength within normal limits. No swelling, no tenderness, no erythema and no left toe deformity.     Sensory   Monofilament testing: intact    Vascular  Capillary refills: < 3 seconds  The left DP pulse is 2+. The left PT pulse is 2+.     Assign Risk Category  No deformity present  No loss of protective sensation  No weak pulses  Risk: 0    Adult Annual Physical  Name: Angelica Gormanpagorge      : 1985      MRN: [id number]  Encounter Provider: Patricio Diehl DO  Encounter Date: 2024   Encounter department: [facility]    Assessment & Plan   1. Annual physical exam  2. Type 2 diabetes mellitus without complication, without long-term current use of insulin (Formerly [facility])  Assessment & Plan:    Lab Results   Component Value Date    HGBA1C 6.6 (A) 2024     Improving  Tolerating mounjaro without side effects  Orders:  -     metFORMIN (GLUCOPHAGE-XR) 500 mg 24 hr tablet; Take 2 tablets (1,000 mg total) by mouth 2 (two) times a day with meals  -     POCT hemoglobin A1c  3. Morbid obesity with BMI of 45.0-49.9, adult (Formerly [facility])  Assessment & Plan:  Wt Readings from Last 3 Encounters:   24 111 kg (245 lb 3.2 oz)   24 114 kg (252 lb)   24 114 kg (251 lb  6.4 oz)     Improving  Patient is educated on the importance of portion control dieting  discussed the importance of exercise and recommend at least 30 minutes, 5 times weekly  discussed benefits of weight loss    4. Hyperlipidemia, unspecified hyperlipidemia type  Assessment & Plan:  Due for repeat lipid panel    5. Chronic pulmonary embolism without acute cor pulmonale, unspecified pulmonary embolism type (HCC)  Assessment & Plan:  Remains on long term anticoagulation  Continue eliquis 5mg bid daily  6. Factor V Leiden mutation (HCC)  7. Anxiety state  Assessment & Plan:  Stable  Continue prozac 40mg     Immunizations and preventive care screenings were discussed with patient today. Appropriate education was printed on patient's after visit summary.    Counseling:  Alcohol/drug use: discussed moderation in alcohol intake, the recommendations for healthy alcohol use, and avoidance of illicit drug use.  Dental Health: discussed importance of regular tooth brushing, flossing, and dental visits.  Injury prevention: discussed safety/seat belts, safety helmets, smoke detectors, carbon dioxide detectors, and smoking near bedding or upholstery.  Sexual health: discussed sexually transmitted diseases, partner selection, use of condoms, avoidance of unintended pregnancy, and contraceptive alternatives.  Exercise: the importance of regular exercise/physical activity was discussed. Recommend exercise 3-5 times per week for at least 30 minutes.          History of Present Illness     Adult Annual Physical:  Patient presents for annual physical.     Diet and Physical Activity:  - Diet/Nutrition: well balanced diet and consuming 3-5 servings of fruits/vegetables daily.  - Exercise: walking.    General Health:  - Sleep: sleeps well.  - Hearing: normal hearing left ear and normal hearing right ear.  - Vision: no vision problems.  - Dental: brushes teeth once daily and regular dental visits.    /GYN Health:  - Follows with GYN:  yes.   - Menopause: premenopausal.   - History of STDs: no  - Contraception: IUD placement.      Review of Systems   Constitutional:  Negative for activity change, chills, diaphoresis, fatigue and fever.   HENT:  Negative for ear pain, hearing loss, postnasal drip, rhinorrhea, sinus pressure, sinus pain, sneezing and sore throat.    Respiratory:  Negative for cough, chest tightness, shortness of breath and wheezing.    Cardiovascular:  Negative for chest pain, palpitations and leg swelling.   Gastrointestinal:  Negative for abdominal pain, blood in stool, constipation, diarrhea, nausea and vomiting.   Endocrine: Negative for polydipsia, polyphagia and polyuria.   Genitourinary:  Negative for dysuria, frequency, hematuria and urgency.   Musculoskeletal:  Negative for arthralgias and myalgias.   Skin:  Negative for pallor.   Neurological:  Negative for dizziness, tremors, seizures, syncope, speech difficulty, weakness, light-headedness, numbness and headaches.   Psychiatric/Behavioral:  Negative for confusion. The patient is not nervous/anxious.      Medical History Reviewed by provider this encounter:       Current Outpatient Medications on File Prior to Visit   Medication Sig Dispense Refill   • apixaban (ELIQUIS) 5 mg Take 1 tablet (5 mg total) by mouth 2 (two) times a day 180 tablet 2   • Empagliflozin (Jardiance) 10 MG TABS tablet Take 1 tablet (10 mg total) by mouth in the morning 90 tablet 1   • famotidine (PEPCID) 40 MG tablet Take 1 tablet (40 mg total) by mouth daily at bedtime 90 tablet 1   • FLUoxetine (PROzac) 40 MG capsule Take 1 capsule (40 mg total) by mouth daily 90 capsule 1   • hydrOXYzine HCL (ATARAX) 25 mg tablet Take 1 tablet (25 mg total) by mouth every 6 (six) hours as needed for anxiety 20 tablet 0   • tirzepatide (Mounjaro) 5 MG/0.5ML Inject 0.5 mL (5 mg total) under the skin every 7 days 2 mL 0   • Blood Glucose Monitoring Suppl (OneTouch Verio Reflect) w/Device KIT Check blood sugars twice  daily. Please substitute with appropriate alternative as covered by patient's insurance. Dx: E11.65 1 kit 0   • Blood Glucose Monitoring Suppl MISC Inject under the skin in the morning 100 each 1   • Cholecalciferol (Vitamin D3) 125 MCG (5000 UT) CAPS Take 1 capsule (5,000 Units total) by mouth in the morning 90 capsule 1   • Continuous Blood Gluc Sensor (Dexcom G7 Sensor) Use 1 Device every 10 days 1 each 3   • Continuous Blood Gluc Transmit (Dexcom G6 Transmitter) MISC Test blood sugar 2-3 times a day 1 each 3   • Continuous Blood Gluc Transmit (Dexcom G6 Transmitter) MISC Use 1 Units 2 (two) times a day 1 each 0   • cyclobenzaprine (FLEXERIL) 10 mg tablet Take 1 tablet (10 mg total) by mouth daily at bedtime for 15 days 15 tablet 0   • glucose blood (OneTouch Verio) test strip Check blood sugars twice daily. Please substitute with appropriate alternative as covered by patient's insurance. Dx: E11.65 200 each 0   • glucose blood test strip Use 1 each daily Use as instructed 200 strip 0   • Lancets MISC Inject under the skin daily Check sugar using side of finger against lancet 200 each 0   • montelukast (SINGULAIR) 10 mg tablet Take 1 tablet (10 mg total) by mouth daily at bedtime 90 tablet 1   • OneTouch Delica Lancets 33G MISC Check blood sugars twice daily. Please substitute with appropriate alternative as covered by patient's insurance. Dx: E11.65 200 each 0   • valACYclovir (VALTREX) 1,000 mg tablet Take 1 tablet (1,000 mg total) by mouth 3 (three) times a day for 7 days 21 tablet 0     No current facility-administered medications on file prior to visit.      Social History     Tobacco Use   • Smoking status: Former     Current packs/day: 0.00     Average packs/day: 0.5 packs/day for 5.0 years (2.5 ttl pk-yrs)     Types: Cigarettes     Start date: 10/9/2015     Quit date: 2018     Years since quittin.5   • Smokeless tobacco: Former     Quit date: 2018   • Tobacco comments:     TOBACCO USE   Vaping  Use   • Vaping status: Never Used   Substance and Sexual Activity   • Alcohol use: Yes     Alcohol/week: 0.0 standard drinks of alcohol     Comment: rarely   • Drug use: No   • Sexual activity: Yes     Partners: Male     Birth control/protection: I.U.D.       Objective     /80 (BP Location: Left arm, Patient Position: Sitting, Cuff Size: Standard)   Pulse 83   Temp 97.5 °F (36.4 °C) (Temporal)   Wt 111 kg (245 lb 3.2 oz)   SpO2 97%   BMI 40.80 kg/m²     Physical Exam  Constitutional:       General: She is not in acute distress.     Appearance: Normal appearance. She is well-developed. She is not diaphoretic.   HENT:      Head: Normocephalic and atraumatic.      Right Ear: Tympanic membrane, ear canal and external ear normal. There is no impacted cerumen.      Left Ear: Tympanic membrane, ear canal and external ear normal. There is no impacted cerumen.      Nose: Nose normal. No congestion or rhinorrhea.      Mouth/Throat:      Mouth: Mucous membranes are moist.      Pharynx: Oropharynx is clear. No oropharyngeal exudate.   Eyes:      Conjunctiva/sclera: Conjunctivae normal.      Pupils: Pupils are equal, round, and reactive to light.   Neck:      Vascular: No JVD.   Cardiovascular:      Rate and Rhythm: Normal rate and regular rhythm.      Pulses: no weak pulses.           Dorsalis pedis pulses are 2+ on the right side and 2+ on the left side.        Posterior tibial pulses are 2+ on the right side and 2+ on the left side.      Heart sounds: Normal heart sounds. No murmur heard.     No friction rub. No gallop.   Pulmonary:      Effort: Pulmonary effort is normal. No respiratory distress.      Breath sounds: Normal breath sounds. No wheezing or rales.   Chest:      Chest wall: No tenderness.   Abdominal:      General: Bowel sounds are normal. There is no distension.      Palpations: Abdomen is soft.      Tenderness: There is no abdominal tenderness. There is no right CVA tenderness, left CVA tenderness,  guarding or rebound.   Musculoskeletal:         General: No tenderness. Normal range of motion.      Cervical back: No tenderness.   Feet:      Right foot:      Skin integrity: No ulcer, skin breakdown, erythema, warmth, callus or dry skin.      Left foot:      Skin integrity: No ulcer, skin breakdown, erythema, warmth, callus or dry skin.   Lymphadenopathy:      Cervical: No cervical adenopathy.   Skin:     General: Skin is warm and dry.   Neurological:      Mental Status: She is alert and oriented to person, place, and time.      Cranial Nerves: No cranial nerve deficit.   Psychiatric:         Mood and Affect: Mood and affect normal.         Behavior: Behavior normal.

## 2024-07-22 NOTE — ASSESSMENT & PLAN NOTE
Wt Readings from Last 3 Encounters:   07/22/24 111 kg (245 lb 3.2 oz)   03/17/24 114 kg (252 lb)   03/12/24 114 kg (251 lb 6.4 oz)     Improving  Patient is educated on the importance of portion control dieting  discussed the importance of exercise and recommend at least 30 minutes, 5 times weekly  discussed benefits of weight loss

## 2024-07-23 LAB — SL AMB POCT HEMOGLOBIN AIC: 6.6 (ref ?–6.5)

## 2024-07-23 NOTE — ASSESSMENT & PLAN NOTE
Lab Results   Component Value Date    HGBA1C 6.6 (A) 07/23/2024     Improving  Tolerating mounjaro without side effects

## 2024-07-25 DIAGNOSIS — E11.9 TYPE 2 DIABETES MELLITUS WITHOUT COMPLICATION, WITHOUT LONG-TERM CURRENT USE OF INSULIN (HCC): ICD-10-CM

## 2024-08-20 DIAGNOSIS — E11.9 TYPE 2 DIABETES MELLITUS WITHOUT COMPLICATION, WITHOUT LONG-TERM CURRENT USE OF INSULIN (HCC): ICD-10-CM

## 2024-09-06 DIAGNOSIS — E11.9 TYPE 2 DIABETES MELLITUS WITHOUT COMPLICATION, WITHOUT LONG-TERM CURRENT USE OF INSULIN (HCC): ICD-10-CM

## 2024-09-06 DIAGNOSIS — Z79.4 TYPE 2 DIABETES MELLITUS TREATED WITH INSULIN (HCC): ICD-10-CM

## 2024-09-06 DIAGNOSIS — E11.9 TYPE 2 DIABETES MELLITUS TREATED WITH INSULIN (HCC): ICD-10-CM

## 2024-09-06 DIAGNOSIS — K21.9 GASTROESOPHAGEAL REFLUX DISEASE WITHOUT ESOPHAGITIS: ICD-10-CM

## 2024-09-06 DIAGNOSIS — J30.2 SEASONAL ALLERGIES: ICD-10-CM

## 2024-09-06 DIAGNOSIS — F41.9 ANXIETY: ICD-10-CM

## 2024-09-06 RX ORDER — FLUOXETINE 40 MG/1
40 CAPSULE ORAL DAILY
Qty: 90 CAPSULE | Refills: 0 | Status: SHIPPED | OUTPATIENT
Start: 2024-09-06 | End: 2025-03-05

## 2024-09-07 ENCOUNTER — APPOINTMENT (OUTPATIENT)
Dept: LAB | Age: 39
End: 2024-09-07
Payer: COMMERCIAL

## 2024-09-07 DIAGNOSIS — E78.5 HYPERLIPIDEMIA, UNSPECIFIED HYPERLIPIDEMIA TYPE: ICD-10-CM

## 2024-09-07 DIAGNOSIS — E11.9 TYPE 2 DIABETES MELLITUS WITHOUT COMPLICATION, WITHOUT LONG-TERM CURRENT USE OF INSULIN (HCC): ICD-10-CM

## 2024-09-07 DIAGNOSIS — I82.4Y9 DEEP VEIN THROMBOSIS (DVT) OF PROXIMAL LOWER EXTREMITY, UNSPECIFIED CHRONICITY, UNSPECIFIED LATERALITY (HCC): ICD-10-CM

## 2024-09-07 LAB
ABO GROUP BLD: NORMAL
ALBUMIN SERPL BCG-MCNC: 4 G/DL (ref 3.5–5)
ALP SERPL-CCNC: 57 U/L (ref 34–104)
ALT SERPL W P-5'-P-CCNC: 18 U/L (ref 7–52)
ANION GAP SERPL CALCULATED.3IONS-SCNC: 13 MMOL/L (ref 4–13)
AST SERPL W P-5'-P-CCNC: 12 U/L (ref 13–39)
BILIRUB SERPL-MCNC: 0.55 MG/DL (ref 0.2–1)
BLD GP AB SCN SERPL QL: NEGATIVE
BUN SERPL-MCNC: 12 MG/DL (ref 5–25)
CALCIUM SERPL-MCNC: 9.2 MG/DL (ref 8.4–10.2)
CHLORIDE SERPL-SCNC: 103 MMOL/L (ref 96–108)
CHOLEST SERPL-MCNC: 241 MG/DL
CO2 SERPL-SCNC: 22 MMOL/L (ref 21–32)
CREAT SERPL-MCNC: 0.57 MG/DL (ref 0.6–1.3)
GFR SERPL CREATININE-BSD FRML MDRD: 117 ML/MIN/1.73SQ M
GLUCOSE P FAST SERPL-MCNC: 135 MG/DL (ref 65–99)
HDLC SERPL-MCNC: 49 MG/DL
LDLC SERPL CALC-MCNC: 156 MG/DL (ref 0–100)
NONHDLC SERPL-MCNC: 192 MG/DL
POTASSIUM SERPL-SCNC: 4.3 MMOL/L (ref 3.5–5.3)
PROT SERPL-MCNC: 6.7 G/DL (ref 6.4–8.4)
RH BLD: POSITIVE
SODIUM SERPL-SCNC: 138 MMOL/L (ref 135–147)
SPECIMEN EXPIRATION DATE: NORMAL
TRIGL SERPL-MCNC: 178 MG/DL

## 2024-09-07 PROCEDURE — 36415 COLL VENOUS BLD VENIPUNCTURE: CPT

## 2024-09-07 PROCEDURE — 86850 RBC ANTIBODY SCREEN: CPT

## 2024-09-07 PROCEDURE — 80061 LIPID PANEL: CPT

## 2024-09-07 PROCEDURE — 80053 COMPREHEN METABOLIC PANEL: CPT

## 2024-09-07 PROCEDURE — 86900 BLOOD TYPING SEROLOGIC ABO: CPT

## 2024-09-07 PROCEDURE — 86901 BLOOD TYPING SEROLOGIC RH(D): CPT

## 2024-09-08 RX ORDER — MONTELUKAST SODIUM 10 MG/1
10 TABLET ORAL
Qty: 90 TABLET | Refills: 1 | Status: SHIPPED | OUTPATIENT
Start: 2024-09-08

## 2024-09-08 RX ORDER — FAMOTIDINE 40 MG/1
40 TABLET, FILM COATED ORAL
Qty: 90 TABLET | Refills: 1 | Status: SHIPPED | OUTPATIENT
Start: 2024-09-08

## 2024-09-08 RX ORDER — METFORMIN HCL 500 MG
1000 TABLET, EXTENDED RELEASE 24 HR ORAL 2 TIMES DAILY WITH MEALS
Qty: 360 TABLET | Refills: 1 | Status: SHIPPED | OUTPATIENT
Start: 2024-09-08

## 2024-09-16 DIAGNOSIS — E11.9 TYPE 2 DIABETES MELLITUS WITHOUT COMPLICATION, WITHOUT LONG-TERM CURRENT USE OF INSULIN (HCC): ICD-10-CM

## 2024-09-17 RX ORDER — TIRZEPATIDE 5 MG/.5ML
INJECTION, SOLUTION SUBCUTANEOUS
Qty: 2 ML | Refills: 0 | Status: SHIPPED | OUTPATIENT
Start: 2024-09-17

## 2024-10-09 DIAGNOSIS — I82.4Y9 DEEP VEIN THROMBOSIS (DVT) OF PROXIMAL LOWER EXTREMITY, UNSPECIFIED CHRONICITY, UNSPECIFIED LATERALITY (HCC): ICD-10-CM

## 2024-10-09 DIAGNOSIS — D68.51 FACTOR V LEIDEN MUTATION (HCC): ICD-10-CM

## 2024-10-09 DIAGNOSIS — E11.9 TYPE 2 DIABETES MELLITUS WITHOUT COMPLICATION, WITHOUT LONG-TERM CURRENT USE OF INSULIN (HCC): ICD-10-CM

## 2024-10-09 DIAGNOSIS — E11.9 TYPE 2 DIABETES MELLITUS TREATED WITH INSULIN (HCC): ICD-10-CM

## 2024-10-09 DIAGNOSIS — Z79.4 TYPE 2 DIABETES MELLITUS TREATED WITH INSULIN (HCC): ICD-10-CM

## 2024-10-09 DIAGNOSIS — F41.9 ANXIETY: ICD-10-CM

## 2024-10-09 DIAGNOSIS — K21.9 GASTROESOPHAGEAL REFLUX DISEASE WITHOUT ESOPHAGITIS: ICD-10-CM

## 2024-10-09 DIAGNOSIS — J30.2 SEASONAL ALLERGIES: ICD-10-CM

## 2024-10-09 RX ORDER — METFORMIN HCL 500 MG
1000 TABLET, EXTENDED RELEASE 24 HR ORAL 2 TIMES DAILY WITH MEALS
Qty: 360 TABLET | Refills: 1 | Status: SHIPPED | OUTPATIENT
Start: 2024-10-09

## 2024-10-09 RX ORDER — FAMOTIDINE 40 MG/1
40 TABLET, FILM COATED ORAL
Qty: 90 TABLET | Refills: 1 | Status: SHIPPED | OUTPATIENT
Start: 2024-10-09

## 2024-10-09 RX ORDER — FLUOXETINE 40 MG/1
40 CAPSULE ORAL DAILY
Qty: 90 CAPSULE | Refills: 1 | Status: SHIPPED | OUTPATIENT
Start: 2024-10-09 | End: 2025-04-07

## 2024-10-09 RX ORDER — MONTELUKAST SODIUM 10 MG/1
10 TABLET ORAL
Qty: 90 TABLET | Refills: 1 | Status: SHIPPED | OUTPATIENT
Start: 2024-10-09

## 2024-11-12 ENCOUNTER — PATIENT MESSAGE (OUTPATIENT)
Dept: FAMILY MEDICINE CLINIC | Facility: CLINIC | Age: 39
End: 2024-11-12

## 2024-11-12 DIAGNOSIS — E11.9 TYPE 2 DIABETES MELLITUS WITHOUT COMPLICATION, WITHOUT LONG-TERM CURRENT USE OF INSULIN (HCC): ICD-10-CM

## 2024-12-18 DIAGNOSIS — E11.9 TYPE 2 DIABETES MELLITUS WITHOUT COMPLICATION, WITHOUT LONG-TERM CURRENT USE OF INSULIN (HCC): ICD-10-CM

## 2025-01-08 DIAGNOSIS — E11.9 TYPE 2 DIABETES MELLITUS WITHOUT COMPLICATION, WITHOUT LONG-TERM CURRENT USE OF INSULIN (HCC): ICD-10-CM

## 2025-01-09 ENCOUNTER — TELEPHONE (OUTPATIENT)
Dept: FAMILY MEDICINE CLINIC | Facility: CLINIC | Age: 40
End: 2025-01-09

## 2025-01-10 DIAGNOSIS — E11.9 TYPE 2 DIABETES MELLITUS WITHOUT COMPLICATION, WITHOUT LONG-TERM CURRENT USE OF INSULIN (HCC): ICD-10-CM

## 2025-01-10 DIAGNOSIS — K21.9 GASTROESOPHAGEAL REFLUX DISEASE WITHOUT ESOPHAGITIS: ICD-10-CM

## 2025-01-10 DIAGNOSIS — I82.4Y9 DEEP VEIN THROMBOSIS (DVT) OF PROXIMAL LOWER EXTREMITY, UNSPECIFIED CHRONICITY, UNSPECIFIED LATERALITY (HCC): ICD-10-CM

## 2025-01-10 DIAGNOSIS — F41.9 ANXIETY: ICD-10-CM

## 2025-01-10 DIAGNOSIS — Z79.4 TYPE 2 DIABETES MELLITUS TREATED WITH INSULIN (HCC): ICD-10-CM

## 2025-01-10 DIAGNOSIS — E11.9 TYPE 2 DIABETES MELLITUS TREATED WITH INSULIN (HCC): ICD-10-CM

## 2025-01-10 DIAGNOSIS — J30.2 SEASONAL ALLERGIES: ICD-10-CM

## 2025-01-10 DIAGNOSIS — D68.51 FACTOR V LEIDEN MUTATION (HCC): ICD-10-CM

## 2025-01-12 RX ORDER — FLUOXETINE 40 MG/1
40 CAPSULE ORAL DAILY
Qty: 90 CAPSULE | Refills: 1 | Status: SHIPPED | OUTPATIENT
Start: 2025-01-12 | End: 2025-07-11

## 2025-01-13 DIAGNOSIS — D68.51 FACTOR V LEIDEN MUTATION (HCC): ICD-10-CM

## 2025-01-13 DIAGNOSIS — I82.4Y9 DEEP VEIN THROMBOSIS (DVT) OF PROXIMAL LOWER EXTREMITY, UNSPECIFIED CHRONICITY, UNSPECIFIED LATERALITY (HCC): ICD-10-CM

## 2025-01-13 RX ORDER — FAMOTIDINE 40 MG/1
40 TABLET, FILM COATED ORAL
Qty: 90 TABLET | Refills: 0 | OUTPATIENT
Start: 2025-01-13

## 2025-01-13 RX ORDER — METFORMIN HYDROCHLORIDE 500 MG/1
1000 TABLET, EXTENDED RELEASE ORAL 2 TIMES DAILY WITH MEALS
Qty: 360 TABLET | Refills: 0 | OUTPATIENT
Start: 2025-01-13

## 2025-01-13 RX ORDER — MONTELUKAST SODIUM 10 MG/1
10 TABLET ORAL
Qty: 90 TABLET | Refills: 0 | OUTPATIENT
Start: 2025-01-13

## 2025-01-14 RX ORDER — APIXABAN 5 MG/1
5 TABLET, FILM COATED ORAL 2 TIMES DAILY
Qty: 180 TABLET | Refills: 0 | Status: SHIPPED | OUTPATIENT
Start: 2025-01-14 | End: 2025-01-17 | Stop reason: SDUPTHER

## 2025-01-17 ENCOUNTER — TELEPHONE (OUTPATIENT)
Age: 40
End: 2025-01-17

## 2025-01-17 DIAGNOSIS — E11.9 TYPE 2 DIABETES MELLITUS WITHOUT COMPLICATION, WITHOUT LONG-TERM CURRENT USE OF INSULIN (HCC): ICD-10-CM

## 2025-01-17 DIAGNOSIS — I82.4Y9 DEEP VEIN THROMBOSIS (DVT) OF PROXIMAL LOWER EXTREMITY, UNSPECIFIED CHRONICITY, UNSPECIFIED LATERALITY (HCC): ICD-10-CM

## 2025-01-17 DIAGNOSIS — D68.51 FACTOR V LEIDEN MUTATION (HCC): ICD-10-CM

## 2025-01-17 NOTE — TELEPHONE ENCOUNTER
Pt has sintia khan ,needs PA for her eliquis and her mounjaro.     Insurance is scanned in pts chart

## 2025-01-17 NOTE — TELEPHONE ENCOUNTER
[facility] - Progress Note    Subjective    Feels well. Epigastric pain has resolved. Denies dysphagia. Respiratory status is stable. J tube placement planned for tomorrow.      Medications  Current Facility-Administered Medications   Medication Dose Route Frequency Provider Last Rate Last Admin   • sodium chloride (PF) 0.9 % injection 20 mL  20 mL Injection PRN Deedee Lamb, DO       • sodium chloride (PF) 0.9 % injection 10 mL  10 mL Injection PRN Deedee Lamb, DO       • potassium CHLORIDE 20 mEq/100mL IVPB premix  20 mEq Intravenous Once Nathanael Grigsby MD 50 mL/hr at 12/20/20 0937 20 mEq at 12/20/20 0937    Followed by   • potassium CHLORIDE 20 mEq/100mL IVPB premix  20 mEq Intravenous Once Nathanael Grigsby MD       • potassium CHLORIDE 20 mEq/100mL IVPB premix  20 mEq Intravenous Once Nathanael Grigsby MD        Followed by   • potassium CHLORIDE 20 mEq/100mL IVPB premix  20 mEq Intravenous Once Nathanael Grigsby MD       • sodium phosphate 15 mmol in sodium chloride 0.9 % 250 mL IVPB  15 mmol Intravenous Once Luis Fernando Fink 83.3 mL/hr at 12/20/20 1000 15 mmol at 12/20/20 1000   • sodium chloride 0.9 % flush bag 25 mL  25 mL Intravenous PRN Luis Fernandojanice Fink       • sodium chloride (PF) 0.9 % injection 2 mL  2 mL Intracatheter 2 times per day Luis Fernando Fink   2 mL at 12/20/20 0937   • TPN - DIETICIAN/PHARMACIST MANAGED   Does not apply See Admin Instructions Luis Fernando Fink       • nitrofurantoin (macrocrystal-monohydrate) (MACROBID) SR capsule 100 mg  100 mg Oral 2 times per day Nathanael Grigsby MD   100 mg at 12/20/20 0931   • fat emulsion 20 % injection 250 mL  250 mL Intravenous Q24H Luis Fernando Fink 20.8 mL/hr at 12/20/20 0600 Rate Verify at 12/20/20 0600   • dextrose 10 % infusion  1,000 mL Intravenous Continuous PRN Luis Fernando Fink       • parenteral nutrition adult custom central   Intravenous Continuous PN Luis Fernando Fink 100 mL/hr at 12/20/20 0600 Rate Verify at 12/20/20 0600   • Potassium Standard  Please review. Replacement Protocol   Does not apply See Admin Instructions Iwona Zarate MD       • sucralfate (CARAFATE) 1 GM/10ML suspension 1 g  1 g Oral 4x Daily AC & HS Noe Borja MD   1 g at 12/20/20 0934   • alum-mag hydroxide+simethicone/lidocaine viscous (2:1) (MAGIC MOUTHWASH/GI COCKTAIL) (compounded) oral suspension 15 mL  15 mL Oral TID AC Noe Borja MD   15 mL at 12/19/20 1120   • nystatin (MYCOSTATIN) 513563 UNIT/ML suspension 500,000 Units  500,000 Units Swish & Swallow 4x Daily Esmael Mayar   500,000 Units at 12/20/20 0931   • remdesivir 100 mg in sodium chloride 0.9 % 250 mL total volume infusion  100 mg Intravenous Daily at noon Kalyani Alarcon  mL/hr at 12/19/20 1543 100 mg at 12/19/20 1543   • albuterol inhaler 2 puff  2 puff Inhalation Q6H Resp PRN Kalyani Alarcon MD       • pantoprazole (PROTONIX INJECT) injection 40 mg  40 mg Intravenous Nightly Kalyani Alarcon MD   40 mg at 12/19/20 2125   • ondansetron (ZOFRAN) injection 4 mg  4 mg Intravenous Q12H PRN Kalyani Alarcon MD   4 mg at 12/19/20 2124   • acetaminophen (TYLENOL) suppository 325 mg  325 mg Rectal Q4H PRN Kalyani Alarcon MD   325 mg at 12/19/20 1930   • enoxaparin (LOVENOX) injection 40 mg  40 mg Subcutaneous Q24H Kalyani Alarcon MD   40 mg at 12/20/20 0156       I/O's    Intake/Output Summary (Last 24 hours) at 12/20/2020 1017  Last data filed at 12/20/2020 0600  Gross per 24 hour   Intake 834.91 ml   Output --   Net 834.91 ml       Last Recorded Vitals    Vitals with min/max:      Vital Last Value 24 Hour Range   Temperature 99 °F (37.2 °C) (12/20/20 0521) Temp  Min: 98.1 °F (36.7 °C)  Max: 99 °F (37.2 °C)   Pulse (!) 54 (12/20/20 0521) Pulse  Min: 54  Max: 64   Respiratory 18 (12/19/20 2040) Resp  Min: 18  Max: 18   Non-Invasive  Blood Pressure 133/82 (12/20/20 0521) BP  Min: 119/76  Max: 133/82   Pulse Oximetry 98 % (12/20/20 0521) SpO2  Min: 96 %  Max: 99 %   Arterial   Blood Pressure   No data recorded          Physical Exam  Physical Exam  Constitutional:       Appearance: Normal appearance.   HENT:      Head: Normocephalic and atraumatic.   Eyes:      Conjunctiva/sclera: Conjunctivae normal.   Cardiovascular:      Rate and Rhythm: Normal rate and regular rhythm.      Heart sounds: No murmur. No gallop.    Pulmonary:      Effort: Pulmonary effort is normal. No respiratory distress.      Breath sounds: Normal breath sounds. No wheezing or rales.   Abdominal:      General: Abdomen is flat. There is no distension.      Palpations: Abdomen is soft.      Tenderness: There is no abdominal tenderness. There is no guarding.   Musculoskeletal: Normal range of motion.   Skin:     General: Skin is warm and dry.   Neurological:      Mental Status: She is alert and oriented to person, place, and time.   Psychiatric:         Mood and Affect: Mood normal.         Behavior: Behavior normal.        Imaging  No results found.    Labs     Recent Labs   Lab 12/20/20  0454 12/19/20  1336 12/19/20  0429  12/18/20  0412 12/17/20  1037  12/17/20  0605 12/16/20 2033   WBC 3.0*  --  4.1*  --  3.7*  --    < > 1.1* 1.1*   HCT 28.9*  --  30.5*  --  29.5*  --    < > 32.9* 35.0*   HGB 9.7*  --  10.4*  --  10.1*  --    < > 10.9* 11.6*   PLT 99*  --  91*  --  88*  --    < > 111* 125*   INR  --   --   --   --   --  1.1  --  1.1  --    PTT  --   --   --   --   --   --   --  22  --    SODIUM 141  --  143  --  142 141  --  140 139   POTASSIUM 3.0* 3.3* 3.2*   < > 3.1* 3.2*  --  3.1* 3.1*   CHLORIDE 109*  --  111*  --  112* 108*  --  108* 105   CO2 25  --  25  --  24 24  --  24 21   CALCIUM 8.0*  --  8.2*  --  8.1* 8.3*  --  8.4 9.1   GLUCOSE 107*  --  80  --  86 79  --  80 87   BUN 10  --  13  --  13 13  --  13 15   CREATININE 0.45*  --  0.56  --  0.64 0.58  --  0.60 0.62   AST 30  --  33  --  41* 26  --  23 29   GPT 21  --  22  --  22 20  --  20 23   ALKPT 47  --  46  --  42* 47  --  46 51   BILIRUBIN 0.3  --  0.4  --  0.4 0.4  --  0.3 0.4    ALBUMIN 2.5*  --  2.8*  --  2.5* 2.9*  --  2.8* 3.3*   LIPA  --   --   --   --   --   --   --   --  78   PHOS 2.3* 2.7  --   --   --   --   --   --  3.3    < > = values in this interval not displayed.       Assessment and Plan:    Progressive Dysphagia likely 2/2 esophagitis secondary to hx Esophageal Cancer  Diagnosed 9/2020   S/p recent ChemoXRT completion   Albumin 2.5 (12/18)  PICC Line placement (12/20)    - J Tube Creation (12/21) scheduled   - Continue TPN Diet without diet tray    - Continue nystatin swish and swallow QID, IV Protonix, Magic Mouthwash/GI Cocktail TID, Carafate QID   - ThoracicSx following   - Oncology to consult: Under care of Dr. Lira. Received neoadjuvant concurrent Chemo/Rad Tx with weekly carboplatin and Taxo, just completed tx earlier this week. After 3-4wks, will need to be evaluated for possible surgical resection. 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 Achieve pain control with liquid Norco or liquid Tylenol #3, IV pain meds as an alternative   - ENT consulted: given recent hx of rad tx and patient immunosuppression, consider fungal infection, esophagitis and mucositis 2/2 radiation. No ulcerations or plaques noted in oropharynx on exam.    - Nutrition recommends Osmolite 1.2@75, 330gm dextrose,96gm AA, and 250ml lipids daily      Oral Thrush, resolving   - Continue nystatin QID   - Continue Magic Mouthwash Cocktail TID    - ENT consulted: pt with scrapable white plaque on tongue, see if symptoms resolve with oral anti-fungal ppx.     COVID-19 Infection  COVID positive (12/16)   CXR (12/20) noted right PICC tip at junction of SVC and right atrium; no acute cardiopulmonary process.  Procal 2.27 (12/18)             - Oxygenating on RA   - Currently afebrile (12/20)              - Continue Remdesivir (12/17-12/22)              - Resp Support: PRN albuterol inhaler              - Encourage IS              - Maintain contact and droplet isolation precautions       Possible Sepsis 2/2 UTI (POA)  UA (12/16) noted 80 ketones, >1.030 specific gravity, moderate occult blood, 100 protein,  erythrocytes, 6-10 leukocytes, and few bacteria         Patient did not tolerate ceftriaxone             - Continue Macrobid BID (12/17-12/22)     Coagulopathy 2/2 COVID vs Malignancy  BLE Venous DPLX (12/19) negative   - D-dimer 3.43 > 2.83 (12/20)   - Continue SQ Lovenox   - Heme/onc following: do not believe she needs systemic AC at this time, DVT ppx is recommended.   - will discuss with hem starting heparin gtt    Leukopenia, 2/2 Chemo/Rad Tx  S/p Zarxio 480mcg x1 (12/17)   - WBC 4.1 > 3.0 (12/20)   - Heme/onc following, would give x1 Neupogen today     Normocytic Anemia   - Hb 10.4 > 9.7 (12/20)   - Monitor and transfuse for Hb <7   - Monitor on SQ Lovenox   - Heme/Onc following, transfusion may be needed tomorrow after adequate hydration today     Thrombocytopenia   - PLT 91 > 99 (12/20)   - Monitor   - On SQ Lovenox    Hypokalemia  S/p KCl IV 40mEq x1 (12/17), KCl IV 20mEq x2 (12/18) x3 (12/19)     - KCl IV 20mEq x4 (12/20)   - K 3.2 > 3.0 (12/20), replaced with 80 mEq IV    - Monitor and replete as needed    Hypomagnesemia  S/p Mag sulfate 2g IVPB x1 (12/18, 12/20)   - Mag 1.8 > WNL (12/20)   - Monitor and replete as needed    Primary Hypertension             - SBP normotensive past 24H (12/20)             - Not on anti-hypertensive meds     Code Status    Code Status: Full Resuscitation    DVT Prophylaxis  SQ Lovenox    Disposition:  J tube placement Monday. Respiratory status stable on RA     Primary Care Physician  Renu Rushing MD    All patient questions answered  All labs and imaging reviewed    Charting performed by mike Zamudio for Dr. Nathanael Grigsby    All medical record entries made by the scribe were at my direction. I have reviewed the chart  and agree that the record accurately reflects my personal performance of the history, physical  exam,  hospital course, and assessment and plan.     Nathanael Grigsby MD

## 2025-01-18 ENCOUNTER — NURSE TRIAGE (OUTPATIENT)
Dept: OTHER | Facility: OTHER | Age: 40
End: 2025-01-18

## 2025-01-18 DIAGNOSIS — D68.51 FACTOR V LEIDEN MUTATION (HCC): ICD-10-CM

## 2025-01-18 DIAGNOSIS — I82.4Y9 DEEP VEIN THROMBOSIS (DVT) OF PROXIMAL LOWER EXTREMITY, UNSPECIFIED CHRONICITY, UNSPECIFIED LATERALITY (HCC): ICD-10-CM

## 2025-01-18 NOTE — TELEPHONE ENCOUNTER
Regarding: Need scripts for Eliquis to be sent to pharmacys  ----- Message from Alonso PENN sent at 1/18/2025  4:40 PM EST -----  '' I'm asking if a script can be sent to the [facility] pharmacy for apixaban (Eliquis) 5 mg.''

## 2025-01-18 NOTE — TELEPHONE ENCOUNTER
"Reason for Disposition  • [1] Prescription prescribed recently is not at pharmacy AND [2] triager has access to patient's EMR AND [3] prescription is recorded in the EMR    Answer Assessment - Initial Assessment Questions  1. NAME of MEDICINE: \"What medicine(s) are you calling about?\"        Eliquis    2. QUESTION: \"What is your question?\" (e.g., double dose of medicine, side effect)        Waiting prior auth. Would like to pay out of pocket and have medication sent to [facility] pharmacy. [facility] is closed.     3. PRESCRIBER: \"Who prescribed the medicine?\" Reason: if prescribed by specialist, call should be referred to that group.        Dr. Curry    Protocols used: Medication Question Call-Adult-AH    "

## 2025-01-20 ENCOUNTER — TELEPHONE (OUTPATIENT)
Age: 40
End: 2025-01-20

## 2025-01-20 NOTE — TELEPHONE ENCOUNTER
PA for apixaban (Eliquis) 5 mg APPROVED     Date(s) approved  December 21, 2024 to July 19, 2025     Case #[id number]     Patient advised by          [x][employer] Message  []Phone call   []LMOM  []L/M to call office as no active Communication consent on file  [x]Unable to leave detailed message as VM not approved on Communication consent       Pharmacy advised by    [x]Fax  []Phone call    Approval letter scanned into Media No Not available at time of decision.

## 2025-01-20 NOTE — TELEPHONE ENCOUNTER
PA for Tirzepatide (Mounjaro) 5 MG/0.5ML SUBMITTED to     via    []CMM-KEY:   [x]Surescripts-Case ID # [id number]   []Availity-Auth ID # NDC #   []Faxed to plan   []Other website   []Phone call Case ID #     [x]PA sent as URGENT    All office notes, labs and other pertaining documents and studies sent. Clinical questions answered. Awaiting determination from insurance company.     Turnaround time for your insurance to make a decision on your Prior Authorization can take 7-21 business days.

## 2025-01-20 NOTE — TELEPHONE ENCOUNTER
PA for apixaban (Eliquis) 5 mg SUBMITTED to     via    []CMM-KEY:   [x]Surescripts-Case ID # [id number]   []Availity-Auth ID # NDC #   []Faxed to plan   []Other website   []Phone call Case ID #     [x]PA sent as URGENT    All office notes, labs and other pertaining documents and studies sent. Clinical questions answered. Awaiting determination from insurance company.     Turnaround time for your insurance to make a decision on your Prior Authorization can take 7-21 business days.      Please see separate encounter for requested PA for Mounjaro.

## 2025-01-20 NOTE — TELEPHONE ENCOUNTER
PA for Tirzepatide (Mounjaro) 5 MG/0.5ML  APPROVED     Date(s) approved December 21, 2024 to January 20, 2026     Case #[id number]     Patient advised by          [x]MyChart Message  []Phone call   []LMOM  []L/M to call office as no active Communication consent on file  [x]Unable to leave detailed message as VM not approved on Communication consent       Pharmacy advised by    [x]Fax  []Phone call    Approval letter scanned into Media No Not available at time of decision

## 2025-01-23 ENCOUNTER — TELEPHONE (OUTPATIENT)
Dept: FAMILY MEDICINE CLINIC | Facility: CLINIC | Age: 40
End: 2025-01-23

## 2025-01-24 NOTE — TELEPHONE ENCOUNTER
Duplicate encounter created, please see telephone encounter from 01/20/2025 regarding Mounjaro PA status. Please review patient's chart to see if there is already an encounter regarding the medication in question and to document anything regarding this medication in regards to anything regarding the authorization process etc before creating another encounter Thank You.

## 2025-01-31 DIAGNOSIS — E11.9 TYPE 2 DIABETES MELLITUS WITHOUT COMPLICATION, WITHOUT LONG-TERM CURRENT USE OF INSULIN (HCC): ICD-10-CM

## 2025-02-04 ENCOUNTER — OFFICE VISIT (OUTPATIENT)
Dept: FAMILY MEDICINE CLINIC | Facility: CLINIC | Age: 40
End: 2025-02-04
Payer: COMMERCIAL

## 2025-02-04 VITALS
SYSTOLIC BLOOD PRESSURE: 110 MMHG | DIASTOLIC BLOOD PRESSURE: 70 MMHG | BODY MASS INDEX: 38.82 KG/M2 | TEMPERATURE: 97.3 F | WEIGHT: 233 LBS | HEART RATE: 86 BPM | OXYGEN SATURATION: 98 % | HEIGHT: 65 IN

## 2025-02-04 DIAGNOSIS — E66.01 MORBID OBESITY WITH BMI OF 45.0-49.9, ADULT (HCC): ICD-10-CM

## 2025-02-04 DIAGNOSIS — D68.51 FACTOR V LEIDEN MUTATION (HCC): ICD-10-CM

## 2025-02-04 DIAGNOSIS — F41.1 ANXIETY STATE: ICD-10-CM

## 2025-02-04 DIAGNOSIS — E11.9 TYPE 2 DIABETES MELLITUS WITHOUT COMPLICATION, WITHOUT LONG-TERM CURRENT USE OF INSULIN (HCC): Primary | ICD-10-CM

## 2025-02-04 DIAGNOSIS — E78.5 HYPERLIPIDEMIA, UNSPECIFIED HYPERLIPIDEMIA TYPE: ICD-10-CM

## 2025-02-04 DIAGNOSIS — I27.82 CHRONIC PULMONARY EMBOLISM WITHOUT ACUTE COR PULMONALE, UNSPECIFIED PULMONARY EMBOLISM TYPE (HCC): ICD-10-CM

## 2025-02-04 PROBLEM — S93.401A SPRAIN OF UNSPECIFIED LIGAMENT OF RIGHT ANKLE, INITIAL ENCOUNTER: Status: RESOLVED | Noted: 2019-08-05 | Resolved: 2025-02-04

## 2025-02-04 PROBLEM — M54.6 ACUTE LEFT-SIDED THORACIC BACK PAIN: Status: RESOLVED | Noted: 2024-03-12 | Resolved: 2025-02-04

## 2025-02-04 LAB — SL AMB POCT HEMOGLOBIN AIC: 6.3 (ref ?–6.5)

## 2025-02-04 PROCEDURE — 99214 OFFICE O/P EST MOD 30 MIN: CPT | Performed by: FAMILY MEDICINE

## 2025-02-04 PROCEDURE — 83036 HEMOGLOBIN GLYCOSYLATED A1C: CPT | Performed by: FAMILY MEDICINE

## 2025-02-04 NOTE — ASSESSMENT & PLAN NOTE
Stable  A1c is at goal <7.0  CONTINUE METFORMIN, JARDIANCE 10MG, AND MOUNJARO 5MG   Lab Results   Component Value Date    HGBA1C 6.3 02/04/2025     Orders:  •  POCT hemoglobin A1c  •  Tirzepatide 5 MG/0.5ML SOAJ; Inject 5 mg under the skin once a week  •  Albumin / creatinine urine ratio; Future  •  Hemoglobin A1C; Future  •  Comprehensive metabolic panel; Future

## 2025-02-04 NOTE — PROGRESS NOTES
Name: Angelica Nolen      : 1985      MRN: [id number]  Encounter Provider: Patricio Diehl DO  Encounter Date: 2025   Encounter department: [facility]  :  Assessment & Plan  Type 2 diabetes mellitus without complication, without long-term current use of insulin (HCC)  Stable  A1c is at goal <7.0  CONTINUE METFORMIN, JARDIANCE 10MG, AND MOUNJARO 5MG   Lab Results   Component Value Date    HGBA1C 6.3 2025     Orders:  •  POCT hemoglobin A1c  •  Tirzepatide 5 MG/0.5ML SOAJ; Inject 5 mg under the skin once a week  •  Albumin / creatinine urine ratio; Future  •  Hemoglobin A1C; Future  •  Comprehensive metabolic panel; Future    Hyperlipidemia, unspecified hyperlipidemia type  Lab Results   Component Value Date    LDLCALC 156 (H) 2024     Due for repeat lipid panel  Encouraged low fat diet    Orders:  •  Lipid panel; Future    Chronic pulmonary embolism without acute cor pulmonale, unspecified pulmonary embolism type (HCC)  Remains on life long anticoagulation  Continue eliquis 5mg bid       Anxiety state  Stable  In the setting of job in health care  Continue prozac 40mg        Factor V Leiden mutation (HCC)  Remains on life long anticoagulation  Hx of PE and DVT  Orders:  •  CBC and differential; Future    Morbid obesity with BMI of 45.0-49.9, adult ([facility])  Wt Readings from Last 3 Encounters:   25 106 kg (233 lb)   24 111 kg (245 lb 3.2 oz)   24 114 kg (252 lb)     Improving  Patient is educated on the importance of portion control dieting  discussed the importance of exercise and recommend at least 30 minutes, 5 times weekly  discussed benefits of weight loss                  History of Present Illness   HPI presents for routine follow up. She has had this ongoing cough and scratchy throat.   Review of Systems   Constitutional:  Negative for activity change, chills, diaphoresis and fever.   HENT:  Negative for ear pain, hearing loss,  "postnasal drip, rhinorrhea, sinus pressure, sinus pain, sneezing and sore throat.    Respiratory:  Positive for cough. Negative for chest tightness, shortness of breath and wheezing.    Cardiovascular:  Negative for chest pain, palpitations and leg swelling.   Gastrointestinal:  Negative for abdominal pain, blood in stool, constipation, diarrhea, nausea and vomiting.   Genitourinary:  Negative for dysuria, frequency, hematuria and urgency.   Musculoskeletal:  Negative for arthralgias and myalgias.   Neurological:  Negative for dizziness, syncope, weakness, light-headedness, numbness and headaches.       Objective   /70 (BP Location: Left arm, Patient Position: Sitting, Cuff Size: Large)   Pulse 86   Temp (!) 97.3 °F (36.3 °C)   Ht 5' 5\" (1.651 m)   Wt 106 kg (233 lb)   SpO2 98%   BMI 38.77 kg/m²      Physical Exam  Constitutional:       General: She is not in acute distress.     Appearance: Normal appearance. She is well-developed. She is not diaphoretic.   HENT:      Head: Normocephalic and atraumatic.      Right Ear: Tympanic membrane, ear canal and external ear normal. There is no impacted cerumen.      Left Ear: Tympanic membrane, ear canal and external ear normal. There is no impacted cerumen.      Nose: Nose normal. No congestion or rhinorrhea.      Mouth/Throat:      Mouth: Mucous membranes are moist.      Pharynx: Oropharynx is clear. No oropharyngeal exudate.   Eyes:      Conjunctiva/sclera: Conjunctivae normal.      Pupils: Pupils are equal, round, and reactive to light.   Neck:      Vascular: No JVD.   Cardiovascular:      Rate and Rhythm: Normal rate and regular rhythm.      Heart sounds: Normal heart sounds. No murmur heard.     No friction rub. No gallop.   Pulmonary:      Effort: Pulmonary effort is normal. No respiratory distress.      Breath sounds: Normal breath sounds. No wheezing or rales.   Chest:      Chest wall: No tenderness.   Abdominal:      General: Bowel sounds are normal. " There is no distension.      Palpations: Abdomen is soft.      Tenderness: There is no abdominal tenderness. There is no right CVA tenderness, left CVA tenderness, guarding or rebound.   Musculoskeletal:         General: No tenderness. Normal range of motion.      Cervical back: No tenderness.   Lymphadenopathy:      Cervical: No cervical adenopathy.   Skin:     General: Skin is warm and dry.   Neurological:      Mental Status: She is alert and oriented to person, place, and time.      Cranial Nerves: No cranial nerve deficit.   Psychiatric:         Mood and Affect: Mood and affect normal.         Behavior: Behavior normal.

## 2025-02-04 NOTE — ASSESSMENT & PLAN NOTE
Lab Results   Component Value Date    LDLCALC 156 (H) 09/07/2024     Due for repeat lipid panel  Encouraged low fat diet    Orders:  •  Lipid panel; Future

## 2025-02-04 NOTE — ASSESSMENT & PLAN NOTE
Wt Readings from Last 3 Encounters:   02/04/25 106 kg (233 lb)   07/22/24 111 kg (245 lb 3.2 oz)   03/17/24 114 kg (252 lb)     Improving  Patient is educated on the importance of portion control dieting  discussed the importance of exercise and recommend at least 30 minutes, 5 times weekly  discussed benefits of weight loss

## 2025-02-04 NOTE — ASSESSMENT & PLAN NOTE
Prior Authorization Clinical Questions for Weight Management Pharmacotherapy    1. Does the patient have a contrainidcation to medication prescribed for weight management?: No  2. Does the patient have a diagnosis of obesity, confirmed by a BMI greater than or equal to 30 kg/m^2?: Yes  3. Does the patient have a BMI of greater than or equal to 27 kg/m^2 with at least one weight-related comorbidity/risk factor/complication (e.g. diabetes, dyslipidemia, coronary artery disease)?: Yes  4. Weight-related co-morbidities/risk factors: type 2 diabetes, dyslipidemia  5. Has the patient been on a weight loss regimen of low-calorie diet, increased physical activity, and lifestyle modifications for a minimum of 6 months?: No  6. Has the patient completed a comprehensive weight loss program (ie, Weight Watchers, Noom, Bariatrics, other velasquez on phone)? If so, what?: No  7. Does the patient have a history of type 2 diabetes?: Yes  For renewals: Has the patient had a positive outcome with current weight management medication (i.e., change in body weight of at least 4-5% after 12-16 weeks on maximally tolerated dose)?: No     Baseline weight (in pounds): 320 lbs  Current weight (in pounds): 233 lbs  Weight loss percentage: -27.19%

## 2025-02-18 DIAGNOSIS — E11.9 TYPE 2 DIABETES MELLITUS WITHOUT COMPLICATION, WITHOUT LONG-TERM CURRENT USE OF INSULIN (HCC): ICD-10-CM

## 2025-02-18 DIAGNOSIS — D68.51 FACTOR V LEIDEN MUTATION (HCC): ICD-10-CM

## 2025-02-18 DIAGNOSIS — Z79.4 TYPE 2 DIABETES MELLITUS TREATED WITH INSULIN (HCC): ICD-10-CM

## 2025-02-18 DIAGNOSIS — I82.4Y9 DEEP VEIN THROMBOSIS (DVT) OF PROXIMAL LOWER EXTREMITY, UNSPECIFIED CHRONICITY, UNSPECIFIED LATERALITY (HCC): ICD-10-CM

## 2025-02-18 DIAGNOSIS — E11.9 TYPE 2 DIABETES MELLITUS TREATED WITH INSULIN (HCC): ICD-10-CM

## 2025-02-18 DIAGNOSIS — F41.9 ANXIETY: ICD-10-CM

## 2025-02-18 RX ORDER — METFORMIN HYDROCHLORIDE 500 MG/1
1000 TABLET, EXTENDED RELEASE ORAL 2 TIMES DAILY WITH MEALS
Qty: 360 TABLET | Refills: 1 | Status: SHIPPED | OUTPATIENT
Start: 2025-02-18

## 2025-02-18 RX ORDER — FLUOXETINE 40 MG/1
40 CAPSULE ORAL DAILY
Qty: 90 CAPSULE | Refills: 1 | Status: SHIPPED | OUTPATIENT
Start: 2025-02-18 | End: 2025-08-17

## 2025-03-12 DIAGNOSIS — F41.9 ANXIETY: ICD-10-CM

## 2025-03-12 DIAGNOSIS — E11.9 TYPE 2 DIABETES MELLITUS WITHOUT COMPLICATION, WITHOUT LONG-TERM CURRENT USE OF INSULIN (HCC): ICD-10-CM

## 2025-03-12 DIAGNOSIS — Z79.4 TYPE 2 DIABETES MELLITUS TREATED WITH INSULIN (HCC): ICD-10-CM

## 2025-03-12 DIAGNOSIS — K21.9 GASTROESOPHAGEAL REFLUX DISEASE WITHOUT ESOPHAGITIS: ICD-10-CM

## 2025-03-12 DIAGNOSIS — E11.9 TYPE 2 DIABETES MELLITUS TREATED WITH INSULIN (HCC): ICD-10-CM

## 2025-03-12 RX ORDER — FAMOTIDINE 40 MG/1
40 TABLET, FILM COATED ORAL
Qty: 90 TABLET | Refills: 1 | Status: SHIPPED | OUTPATIENT
Start: 2025-03-12

## 2025-03-12 RX ORDER — METFORMIN HYDROCHLORIDE 500 MG/1
1000 TABLET, EXTENDED RELEASE ORAL 2 TIMES DAILY WITH MEALS
Qty: 360 TABLET | Refills: 1 | Status: SHIPPED | OUTPATIENT
Start: 2025-03-12

## 2025-03-12 RX ORDER — FLUOXETINE HYDROCHLORIDE 40 MG/1
40 CAPSULE ORAL DAILY
Qty: 90 CAPSULE | Refills: 1 | Status: SHIPPED | OUTPATIENT
Start: 2025-03-12 | End: 2025-09-08

## 2025-04-08 DIAGNOSIS — E11.9 TYPE 2 DIABETES MELLITUS WITHOUT COMPLICATION, WITHOUT LONG-TERM CURRENT USE OF INSULIN (HCC): ICD-10-CM

## 2025-04-08 RX ORDER — METFORMIN HYDROCHLORIDE 500 MG/1
1000 TABLET, EXTENDED RELEASE ORAL 2 TIMES DAILY WITH MEALS
Qty: 360 TABLET | Refills: 0 | Status: CANCELLED | OUTPATIENT
Start: 2025-04-08

## 2025-04-14 DIAGNOSIS — F41.9 ANXIETY: ICD-10-CM

## 2025-04-14 DIAGNOSIS — I82.4Y9 DEEP VEIN THROMBOSIS (DVT) OF PROXIMAL LOWER EXTREMITY, UNSPECIFIED CHRONICITY, UNSPECIFIED LATERALITY (HCC): ICD-10-CM

## 2025-04-14 DIAGNOSIS — E11.9 TYPE 2 DIABETES MELLITUS TREATED WITH INSULIN (HCC): ICD-10-CM

## 2025-04-14 DIAGNOSIS — Z79.4 TYPE 2 DIABETES MELLITUS TREATED WITH INSULIN (HCC): ICD-10-CM

## 2025-04-14 DIAGNOSIS — D68.51 FACTOR V LEIDEN MUTATION (HCC): ICD-10-CM

## 2025-04-14 DIAGNOSIS — K21.9 GASTROESOPHAGEAL REFLUX DISEASE WITHOUT ESOPHAGITIS: ICD-10-CM

## 2025-04-15 RX ORDER — FLUOXETINE HYDROCHLORIDE 40 MG/1
40 CAPSULE ORAL DAILY
Qty: 90 CAPSULE | Refills: 1 | Status: SHIPPED | OUTPATIENT
Start: 2025-04-15 | End: 2025-10-12

## 2025-04-15 RX ORDER — FAMOTIDINE 40 MG/1
40 TABLET, FILM COATED ORAL
Qty: 90 TABLET | Refills: 1 | Status: SHIPPED | OUTPATIENT
Start: 2025-04-15

## 2025-05-16 DIAGNOSIS — F41.9 ANXIETY: ICD-10-CM

## 2025-05-16 DIAGNOSIS — E11.9 TYPE 2 DIABETES MELLITUS TREATED WITH INSULIN (HCC): ICD-10-CM

## 2025-05-16 DIAGNOSIS — Z79.4 TYPE 2 DIABETES MELLITUS TREATED WITH INSULIN (HCC): ICD-10-CM

## 2025-05-16 DIAGNOSIS — I82.4Y9 DEEP VEIN THROMBOSIS (DVT) OF PROXIMAL LOWER EXTREMITY, UNSPECIFIED CHRONICITY, UNSPECIFIED LATERALITY (HCC): ICD-10-CM

## 2025-05-16 DIAGNOSIS — E11.9 TYPE 2 DIABETES MELLITUS WITHOUT COMPLICATION, WITHOUT LONG-TERM CURRENT USE OF INSULIN (HCC): ICD-10-CM

## 2025-05-16 DIAGNOSIS — D68.51 FACTOR V LEIDEN MUTATION (HCC): ICD-10-CM

## 2025-05-16 RX ORDER — METFORMIN HYDROCHLORIDE 500 MG/1
1000 TABLET, EXTENDED RELEASE ORAL 2 TIMES DAILY WITH MEALS
Qty: 120 TABLET | Refills: 5 | Status: SHIPPED | OUTPATIENT
Start: 2025-05-16

## 2025-05-16 RX ORDER — FLUOXETINE HYDROCHLORIDE 40 MG/1
40 CAPSULE ORAL DAILY
Qty: 90 CAPSULE | Refills: 0 | Status: SHIPPED | OUTPATIENT
Start: 2025-05-16 | End: 2025-11-12

## 2025-05-19 ENCOUNTER — TELEPHONE (OUTPATIENT)
Dept: FAMILY MEDICINE CLINIC | Facility: CLINIC | Age: 40
End: 2025-05-19

## 2025-05-19 NOTE — TELEPHONE ENCOUNTER
PA for Tirzepatide 5MG/0.5ML APPROVED     Date(s) approved 04/19/2025-05/19/2026    Case #[id number]     Patient advised by          [x]MyChart Message  []Phone call   []LMOM  []L/M to call office as no active Communication consent on file  [x]Unable to leave detailed message as VM not approved on Communication consent       Pharmacy advised by    [x]Fax  []Phone call  []Secure Chat     Approval letter scanned into Media No Waiting for payer

## 2025-05-19 NOTE — TELEPHONE ENCOUNTER
PA for Tirzepatide 5MG/0.5ML SUBMITTED to [employer]    via    []CMM-KEY:   [x][employer]-Case ID # [id number]   []Availity-Auth ID # NDC #   []Faxed to plan   []Other website   []Phone call Case ID #     [x]PA sent as URGENT    All office notes, labs and other pertaining documents and studies sent. Clinical questions answered. Awaiting determination from insurance company.     Turnaround time for your insurance to make a decision on your Prior Authorization can take 7-21 business days.

## 2025-05-27 DIAGNOSIS — K21.9 GASTROESOPHAGEAL REFLUX DISEASE WITHOUT ESOPHAGITIS: ICD-10-CM

## 2025-05-27 RX ORDER — FAMOTIDINE 40 MG/1
40 TABLET, FILM COATED ORAL
Qty: 90 TABLET | Refills: 0 | OUTPATIENT
Start: 2025-05-27

## 2025-06-09 DIAGNOSIS — E11.9 TYPE 2 DIABETES MELLITUS WITHOUT COMPLICATION, WITHOUT LONG-TERM CURRENT USE OF INSULIN (HCC): ICD-10-CM

## 2025-06-16 DIAGNOSIS — F41.9 ANXIETY: ICD-10-CM

## 2025-06-16 DIAGNOSIS — E11.9 TYPE 2 DIABETES MELLITUS WITHOUT COMPLICATION, WITHOUT LONG-TERM CURRENT USE OF INSULIN (HCC): ICD-10-CM

## 2025-06-16 DIAGNOSIS — D68.51 FACTOR V LEIDEN MUTATION (HCC): ICD-10-CM

## 2025-06-16 DIAGNOSIS — Z79.4 TYPE 2 DIABETES MELLITUS TREATED WITH INSULIN (HCC): ICD-10-CM

## 2025-06-16 DIAGNOSIS — I82.4Y9 DEEP VEIN THROMBOSIS (DVT) OF PROXIMAL LOWER EXTREMITY, UNSPECIFIED CHRONICITY, UNSPECIFIED LATERALITY (HCC): ICD-10-CM

## 2025-06-16 DIAGNOSIS — K21.9 GASTROESOPHAGEAL REFLUX DISEASE WITHOUT ESOPHAGITIS: ICD-10-CM

## 2025-06-16 DIAGNOSIS — E11.9 TYPE 2 DIABETES MELLITUS TREATED WITH INSULIN (HCC): ICD-10-CM

## 2025-06-17 RX ORDER — FLUOXETINE HYDROCHLORIDE 40 MG/1
40 CAPSULE ORAL DAILY
Qty: 90 CAPSULE | Refills: 1 | Status: SHIPPED | OUTPATIENT
Start: 2025-06-17 | End: 2025-12-14

## 2025-06-17 RX ORDER — METFORMIN HYDROCHLORIDE 500 MG/1
1000 TABLET, EXTENDED RELEASE ORAL 2 TIMES DAILY WITH MEALS
Qty: 120 TABLET | Refills: 5 | Status: SHIPPED | OUTPATIENT
Start: 2025-06-17

## 2025-06-17 RX ORDER — FAMOTIDINE 40 MG/1
40 TABLET, FILM COATED ORAL
Qty: 90 TABLET | Refills: 1 | Status: SHIPPED | OUTPATIENT
Start: 2025-06-17

## 2025-07-10 DIAGNOSIS — E11.9 TYPE 2 DIABETES MELLITUS WITHOUT COMPLICATION, WITHOUT LONG-TERM CURRENT USE OF INSULIN (HCC): ICD-10-CM

## 2025-07-10 DIAGNOSIS — Z79.4 TYPE 2 DIABETES MELLITUS TREATED WITH INSULIN (HCC): ICD-10-CM

## 2025-07-10 DIAGNOSIS — F41.9 ANXIETY: ICD-10-CM

## 2025-07-10 DIAGNOSIS — I82.4Y9 DEEP VEIN THROMBOSIS (DVT) OF PROXIMAL LOWER EXTREMITY, UNSPECIFIED CHRONICITY, UNSPECIFIED LATERALITY (HCC): ICD-10-CM

## 2025-07-10 DIAGNOSIS — K21.9 GASTROESOPHAGEAL REFLUX DISEASE WITHOUT ESOPHAGITIS: ICD-10-CM

## 2025-07-10 DIAGNOSIS — D68.51 FACTOR V LEIDEN MUTATION (HCC): ICD-10-CM

## 2025-07-10 DIAGNOSIS — E11.9 TYPE 2 DIABETES MELLITUS TREATED WITH INSULIN (HCC): ICD-10-CM

## 2025-07-11 RX ORDER — METFORMIN HYDROCHLORIDE 500 MG/1
1000 TABLET, EXTENDED RELEASE ORAL 2 TIMES DAILY WITH MEALS
Qty: 360 TABLET | Refills: 1 | Status: SHIPPED | OUTPATIENT
Start: 2025-07-11

## 2025-07-11 RX ORDER — FLUOXETINE HYDROCHLORIDE 40 MG/1
40 CAPSULE ORAL DAILY
Qty: 90 CAPSULE | Refills: 1 | Status: SHIPPED | OUTPATIENT
Start: 2025-07-11 | End: 2026-01-07

## 2025-07-11 RX ORDER — FAMOTIDINE 40 MG/1
40 TABLET, FILM COATED ORAL
Qty: 90 TABLET | Refills: 1 | Status: SHIPPED | OUTPATIENT
Start: 2025-07-11

## 2025-08-13 ENCOUNTER — TELEPHONE (OUTPATIENT)
Dept: FAMILY MEDICINE CLINIC | Facility: CLINIC | Age: 40
End: 2025-08-13

## 2025-08-17 DIAGNOSIS — I82.4Y9 DEEP VEIN THROMBOSIS (DVT) OF PROXIMAL LOWER EXTREMITY, UNSPECIFIED CHRONICITY, UNSPECIFIED LATERALITY (HCC): ICD-10-CM

## 2025-08-17 DIAGNOSIS — D68.51 FACTOR V LEIDEN MUTATION (HCC): ICD-10-CM

## 2025-08-18 ENCOUNTER — TELEPHONE (OUTPATIENT)
Age: 40
End: 2025-08-18